# Patient Record
Sex: FEMALE | Race: WHITE | NOT HISPANIC OR LATINO | Employment: OTHER | URBAN - METROPOLITAN AREA
[De-identification: names, ages, dates, MRNs, and addresses within clinical notes are randomized per-mention and may not be internally consistent; named-entity substitution may affect disease eponyms.]

---

## 2017-01-10 ENCOUNTER — LAB CONVERSION - ENCOUNTER (OUTPATIENT)
Dept: OTHER | Facility: OTHER | Age: 72
End: 2017-01-10

## 2017-01-10 LAB
A/G RATIO (HISTORICAL): 1.6 (CALC) (ref 1–2.5)
ALBUMIN SERPL BCP-MCNC: 4.3 G/DL (ref 3.6–5.1)
ALP SERPL-CCNC: 51 U/L (ref 33–130)
ALT SERPL W P-5'-P-CCNC: 15 U/L (ref 6–29)
AST SERPL W P-5'-P-CCNC: 15 U/L (ref 10–35)
BILIRUB SERPL-MCNC: 0.4 MG/DL (ref 0.2–1.2)
BUN SERPL-MCNC: 23 MG/DL (ref 7–25)
BUN/CREA RATIO (HISTORICAL): 42 (CALC) (ref 6–22)
CALCIUM SERPL-MCNC: 8.9 MG/DL (ref 8.6–10.4)
CHLORIDE SERPL-SCNC: 102 MMOL/L (ref 98–110)
CHOLEST SERPL-MCNC: 155 MG/DL (ref 125–200)
CHOLEST/HDLC SERPL: 2.1 (CALC)
CO2 SERPL-SCNC: 29 MMOL/L (ref 20–31)
CREAT SERPL-MCNC: 0.55 MG/DL (ref 0.6–0.93)
EGFR AFRICAN AMERICAN (HISTORICAL): 109 ML/MIN/1.73M2
EGFR-AMERICAN CALC (HISTORICAL): 94 ML/MIN/1.73M2
GAMMA GLOBULIN (HISTORICAL): 2.7 G/DL (CALC) (ref 1.9–3.7)
GLUCOSE (HISTORICAL): 106 MG/DL (ref 65–99)
HBA1C MFR BLD HPLC: 6.6 % OF TOTAL HGB
HDLC SERPL-MCNC: 73 MG/DL
LDL CHOLESTEROL (HISTORICAL): 57 MG/DL (CALC)
NON-HDL-CHOL (CHOL-HDL) (HISTORICAL): 82 MG/DL (CALC)
POTASSIUM SERPL-SCNC: 4.2 MMOL/L (ref 3.5–5.3)
SODIUM SERPL-SCNC: 139 MMOL/L (ref 135–146)
TOTAL PROTEIN (HISTORICAL): 7 G/DL (ref 6.1–8.1)
TRIGL SERPL-MCNC: 123 MG/DL

## 2017-01-23 ENCOUNTER — GENERIC CONVERSION - ENCOUNTER (OUTPATIENT)
Dept: OTHER | Facility: OTHER | Age: 72
End: 2017-01-23

## 2017-01-23 ENCOUNTER — ALLSCRIPTS OFFICE VISIT (OUTPATIENT)
Dept: OTHER | Facility: OTHER | Age: 72
End: 2017-01-23

## 2017-02-13 ENCOUNTER — ALLSCRIPTS OFFICE VISIT (OUTPATIENT)
Dept: OTHER | Facility: OTHER | Age: 72
End: 2017-02-13

## 2017-07-11 ENCOUNTER — GENERIC CONVERSION - ENCOUNTER (OUTPATIENT)
Dept: OTHER | Facility: OTHER | Age: 72
End: 2017-07-11

## 2017-07-11 ENCOUNTER — LAB CONVERSION - ENCOUNTER (OUTPATIENT)
Dept: OTHER | Facility: OTHER | Age: 72
End: 2017-07-11

## 2017-07-11 LAB
A/G RATIO (HISTORICAL): 1.5 (CALC) (ref 1–2.5)
ALBUMIN SERPL BCP-MCNC: 4 G/DL (ref 3.6–5.1)
ALP SERPL-CCNC: 49 U/L (ref 33–130)
ALT SERPL W P-5'-P-CCNC: 12 U/L (ref 6–29)
AST SERPL W P-5'-P-CCNC: 15 U/L (ref 10–35)
BASOPHILS # BLD AUTO: 0.7 %
BASOPHILS # BLD AUTO: 41 CELLS/UL (ref 0–200)
BILIRUB SERPL-MCNC: 0.4 MG/DL (ref 0.2–1.2)
BUN SERPL-MCNC: 19 MG/DL (ref 7–25)
BUN/CREA RATIO (HISTORICAL): 33 (CALC) (ref 6–22)
CALCIUM SERPL-MCNC: 9.4 MG/DL (ref 8.6–10.4)
CHLORIDE SERPL-SCNC: 102 MMOL/L (ref 98–110)
CHOLEST SERPL-MCNC: 149 MG/DL (ref 125–200)
CHOLEST/HDLC SERPL: 2.3 (CALC)
CO2 SERPL-SCNC: 32 MMOL/L (ref 20–31)
CREAT SERPL-MCNC: 0.57 MG/DL (ref 0.6–0.93)
DEPRECATED RDW RBC AUTO: 13.9 % (ref 11–15)
EGFR AFRICAN AMERICAN (HISTORICAL): 107 ML/MIN/1.73M2
EGFR-AMERICAN CALC (HISTORICAL): 92 ML/MIN/1.73M2
EOSINOPHIL # BLD AUTO: 189 CELLS/UL (ref 15–500)
EOSINOPHIL # BLD AUTO: 3.2 %
GAMMA GLOBULIN (HISTORICAL): 2.7 G/DL (CALC) (ref 1.9–3.7)
GLUCOSE (HISTORICAL): 108 MG/DL (ref 65–99)
HBA1C MFR BLD HPLC: 6.3 % OF TOTAL HGB
HCT VFR BLD AUTO: 37.4 % (ref 35–45)
HDLC SERPL-MCNC: 66 MG/DL
HGB BLD-MCNC: 12.1 G/DL (ref 11.7–15.5)
LDL CHOLESTEROL (HISTORICAL): 61 MG/DL (CALC)
LYMPHOCYTES # BLD AUTO: 2195 CELLS/UL (ref 850–3900)
LYMPHOCYTES # BLD AUTO: 37.2 %
MCH RBC QN AUTO: 30.5 PG (ref 27–33)
MCHC RBC AUTO-ENTMCNC: 32.4 G/DL (ref 32–36)
MCV RBC AUTO: 94.2 FL (ref 80–100)
MONOCYTES # BLD AUTO: 407 CELLS/UL (ref 200–950)
MONOCYTES (HISTORICAL): 6.9 %
NEUTROPHILS # BLD AUTO: 3068 CELLS/UL (ref 1500–7800)
NEUTROPHILS # BLD AUTO: 52 %
NON-HDL-CHOL (CHOL-HDL) (HISTORICAL): 83 MG/DL (CALC)
PLATELET # BLD AUTO: 318 THOUSAND/UL (ref 140–400)
PMV BLD AUTO: 10.7 FL (ref 7.5–12.5)
POTASSIUM SERPL-SCNC: 4.4 MMOL/L (ref 3.5–5.3)
RBC # BLD AUTO: 3.97 MILLION/UL (ref 3.8–5.1)
SODIUM SERPL-SCNC: 140 MMOL/L (ref 135–146)
TOTAL PROTEIN (HISTORICAL): 6.7 G/DL (ref 6.1–8.1)
TRIGL SERPL-MCNC: 111 MG/DL
WBC # BLD AUTO: 5.9 THOUSAND/UL (ref 3.8–10.8)

## 2017-07-19 ENCOUNTER — ALLSCRIPTS OFFICE VISIT (OUTPATIENT)
Dept: OTHER | Facility: OTHER | Age: 72
End: 2017-07-19

## 2017-07-19 DIAGNOSIS — E78.5 HYPERLIPIDEMIA: ICD-10-CM

## 2017-07-19 DIAGNOSIS — E11.9 TYPE 2 DIABETES MELLITUS WITHOUT COMPLICATIONS (HCC): ICD-10-CM

## 2017-07-19 DIAGNOSIS — F41.8 OTHER SPECIFIED ANXIETY DISORDERS: ICD-10-CM

## 2017-07-19 DIAGNOSIS — Z00.00 ENCOUNTER FOR GENERAL ADULT MEDICAL EXAMINATION WITHOUT ABNORMAL FINDINGS: ICD-10-CM

## 2017-07-19 DIAGNOSIS — I10 ESSENTIAL (PRIMARY) HYPERTENSION: ICD-10-CM

## 2017-07-20 LAB
CREATININE, RANDOM URINE (HISTORICAL): 74 MG/DL (ref 20–320)
MAGNESIUM, UR (HISTORICAL): 0.5 MG/DL
MICROALBUMIN/CREATININE RATIO (HISTORICAL): 7 MCG/MG CREAT

## 2017-07-31 ENCOUNTER — LAB CONVERSION - ENCOUNTER (OUTPATIENT)
Dept: OTHER | Facility: OTHER | Age: 72
End: 2017-07-31

## 2017-07-31 LAB — FECAL GLOBIN BY IMMUNOCHEM (HISTORICAL): NOT DETECTED

## 2017-08-01 ENCOUNTER — GENERIC CONVERSION - ENCOUNTER (OUTPATIENT)
Dept: OTHER | Facility: OTHER | Age: 72
End: 2017-08-01

## 2017-08-01 LAB — OCCULT BLD, FECAL IMMUNOLOGICAL (HISTORICAL): NEGATIVE

## 2017-09-13 ENCOUNTER — ALLSCRIPTS OFFICE VISIT (OUTPATIENT)
Dept: OTHER | Facility: OTHER | Age: 72
End: 2017-09-13

## 2017-10-23 ENCOUNTER — ALLSCRIPTS OFFICE VISIT (OUTPATIENT)
Dept: OTHER | Facility: OTHER | Age: 72
End: 2017-10-23

## 2017-10-23 DIAGNOSIS — M17.10 PRIMARY OSTEOARTHRITIS OF ONE KNEE: ICD-10-CM

## 2017-10-25 ENCOUNTER — TRANSCRIBE ORDERS (OUTPATIENT)
Dept: ADMINISTRATIVE | Facility: HOSPITAL | Age: 72
End: 2017-10-25

## 2017-10-25 ENCOUNTER — APPOINTMENT (OUTPATIENT)
Dept: RADIOLOGY | Facility: MEDICAL CENTER | Age: 72
End: 2017-10-25
Payer: COMMERCIAL

## 2017-10-25 DIAGNOSIS — M17.10 PRIMARY OSTEOARTHRITIS OF ONE KNEE: ICD-10-CM

## 2017-10-25 PROCEDURE — 73562 X-RAY EXAM OF KNEE 3: CPT

## 2017-10-25 NOTE — PROGRESS NOTES
Assessment  1  Osteoarthritis of knee (715 36) (M17 10)   2  Hypertension (401 9) (I10)   3  Diabetes mellitus (250 00) (E11 9)    Plan  Osteoarthritis of knee    · Meloxicam 15 MG Oral Tablet; take 1 tablet daily with food   · * XR KNEE 3 VW LEFT NON INJURY; Status:Active; Requested ICT:[id number];    · * XR KNEE 3 VW RIGHT NON INJURY; Status:Active; Requested CDI:[id number];     Discussion/Summary    1  Osteoarthritis of the knee bilaterally-patient does appear to have healthy kidneys and has not had any history of problem with peptic ulcer disease  Would recommend starting on meloxicam 15 mg daily with food  If this does not improve in the next 2 weeks would consider follow-up with orthopedic specialist for injection therapies or viscosupplementation  X-rays of the knees will also be performed bilaterally  Hypertension-presently stable  Will continue to monitor as patient is on NSAID therapy  Type 2 diabetes-presently stable  Will avoid steroids at this point as they may elevate her glucose readings  Chief Complaint  pt is here for bilateral knee pain   cd      History of Present Illness  HPI: This is a 76-year-old female who presents to the office for follow-up of ongoing bilateral knee pain  She was seen by Nell Lei in September for this and given a short course of prednisone  She states that this symptoms dramatically improved while she was on prednisone but as soon as she stopped it it became worse again  She is having trouble getting up from a seated position  There is a lot of grinding and crepitus in her knees with movement  She does notice some swelling generally bilaterally but has not had any redness  Review of Systems    Constitutional: no fever,-- not feeling poorly,-- no chills-- and-- not feeling tired  ENT: no nosebleeds-- and-- no nasal discharge  Cardiovascular: no chest pain-- and-- no palpitations  Respiratory: no cough-- and-- no shortness of breath during exertion     Gastrointestinal: no nausea-- and-- no diarrhea  Musculoskeletal: no arthralgias-- and-- no myalgias  Neurological: no headache-- and-- no confusion  Active Problems  1  Abnormal finding on mammography (793 80) (R92 8)   2  Bladder Incontinence   3  Closed Fracture Of Proximal End Of Humerus (812 00)   4  Colon cancer screening (V76 51) (Z12 11)   5  Cough (786 2) (R05)   6  Depression with anxiety (300 4) (F41 8)   7  Diabetes mellitus (250 00) (E11 9)   8  Dupuytren's contracture (728 6) (M72 0)   9  Encounter for screening mammogram for malignant neoplasm of breast (V76 12)   (Z12 31)   10  Glaucoma screening (V80 1) (Z13 5)   11  Hearing Loss (389 9)   12  Hyperlipidemia (272 4) (E78 5)   13  Hypertension (401 9) (I10)   14  Inflamed seborrheic keratosis (702 11) (L82 0)   15  Joint pain, knee (719 46) (M25 569)   16  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), 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)   17  LBBB (left bundle branch block) (426 3) (I44 7)   18  Need for 23-polyvalent pneumococcal polysaccharide vaccine (V03 82) (Z23)   19  Need for prophylactic vaccination and inoculation against influenza (V04 81) (Z23)   20  Nocturnal leg cramps (327 52) (G47 62)   21  Osteoarthritis of knee (715 36) (M17 10)   22  Osteoporosis screening (V82 81) (Z13 820)   23  Sinusitis (473 9) (J32 9)   24  Skin lesion (709 9) (L98 9)   25  Uncontrolled diabetes mellitus (250 02) (E11 65)    Past Medical History  1  Sinusitis (473 9) (J32 9)   2  History of Type 2 diabetes mellitus with hyperglycemia (250 00) (E11 65)  Active Problems And Past Medical History Reviewed: The active problems and past medical history were reviewed and updated today  Family History  Mother    1  Family history of Diabetes Mellitus (V18 0)   2  Family history of Hypertension (V17 49)  Father    3  Family history of Colon Cancer (V16 0)   4   Family history of Stroke Syndrome (V17 1)    Social History   · Being A Social Drinker   · Currently works part-time (V49 89) (Z78 9)   ·    · Never a smoker   · Secondhand smoke exposure (V15 89) (Z77 22)    Surgical History  1  History of Appendectomy   2  History of Breast Surgery Reduction Procedure Bilateral   3  History of Knee Surgery   4  History of Renal Lithotripsy   5  History of Tonsillectomy With Adenoidectomy    Current Meds   1  AmLODIPine Besylate 5 MG Oral Tablet; Take 1 tablet daily; Therapy: 66OJX7458 to (Ashly Muniz)  Requested for: 67ETT6691; Last   Rx:45Rlb4664 Ordered   2  Atenolol 25 MG Oral Tablet; TAKE 1 TABLET Bedtime; Therapy: 86THF7940 to (QNMVZVEV:65TIX5485)  Requested for: 75CUB0578; Last   Rx:17Jan2017 Ordered   3  Citalopram Hydrobromide 10 MG Oral Tablet; Take 1 tablet daily; Therapy: 55OCF9377 to (Last Rx:62Jpw5334)  Requested for: 82SKU0093 Ordered   4  Cyclobenzaprine HCl - 10 MG Oral Tablet; TAKE 1 TABLET AT BEDTIME; Therapy: 08BSO6502 to (Evaluate:13Yih8922)  Requested for: 61EQQ1869; Last   Rx:42Bmo7383 Ordered   5  FreeStyle Lite Device; USE AS DIRECTED; Therapy: 23Lgh3385 to (Last Rx:62Ujd1886)  Requested for: 17Imt3935 Ordered   6  FreeStyle Lite Test In Vitro Strip; TEST 2 TIMES A DAY   Dx 250; Therapy: 28Gmx9370 to (Last Rx:77Pru1995)  Requested for: 76Rny6694 Ordered   7  Furosemide 40 MG Oral Tablet; take one-half tablet daily; Therapy: 71DFF5045 to (Cooper Love)  Requested for: 03CBG2520; Last   Rx:26Jan2016 Ordered   8  Januvia 100 MG Oral Tablet; TAKE 1 TABLET DAILY; Therapy: 12BNK4709 to (Evaluate:20Iut8983)  Requested for: 46Zvw4872; Last   Rx:73Goz4906 Ordered   9  MetFORMIN HCl - 1000 MG Oral Tablet; TAKE 1 TABLET TWICE DAILY; Therapy: 48KQN4071 to (Evaluate:38Xcv6122)  Requested for: 24Ivl9277; Last   Rx:19Bfi5971 Ordered   10  Multivitamins CAPS; TAKE 1 CAPSULE DAILY; Therapy: (Recorded:47Erk8442) to Recorded   11  PredniSONE 10 MG Oral Tablet; TAKE 1 TABLET 3 times daily with meals;     Therapy: 13Sep2017 to (Last Rx: 22GZH6364)  Requested for: 98Pnd6401 Ordered   12  Simvastatin 40 MG Oral Tablet; take 1 tablet every day; Therapy: 54ZUU4712 to (Evaluate:95Jgb6373)  Requested for: 13Eky7533; Last    Rx:42Uxu8620 Ordered    The medication list was reviewed and updated today  Allergies  1  Penicillins    Vitals   Recorded: 99TYL2136 04:36PM   Heart Rate 78, L Radial   Pulse Quality Regular, L Radial   Systolic 898, LUE, Sitting   Diastolic 62, LUE, Sitting   Height 5 ft 4 in   Weight 228 lb    BMI Calculated 39 14   BSA Calculated 2 07     Physical Exam    Constitutional   General appearance: No acute distress, well appearing and well nourished  Eyes   Conjunctiva and lids: No swelling, erythema or discharge  Pupils and irises: Equal, round and reactive to light  Ears, Nose, Mouth, and Throat   External inspection of ears and nose: Normal     Otoscopic examination: Tympanic membranes translucent with normal light reflex  Canals patent without erythema  Nasal mucosa, septum, and turbinates: Normal without edema or erythema  Oropharynx: Normal with no erythema, edema, exudate or lesions  Pulmonary   Respiratory effort: No increased work of breathing or signs of respiratory distress  Auscultation of lungs: Clear to auscultation  Cardiovascular   Auscultation of heart: Normal rate and rhythm, normal S1 and S2, without murmurs  Examination of extremities for edema and/or varicosities: Normal     Carotid pulses: Normal     Musculoskeletal   Gait and station: Abnormal  -- Antalgic gait  Inspection/palpation of joints, bones, and muscles: Abnormal  -- Crepitus with range of motion bilateral knees  There is bilateral joint effusion present  Skin   Skin and subcutaneous tissue: Normal without rashes or lesions  Neurologic   Reflexes: 2+ and symmetric  Sensation: No sensory loss      Psychiatric   Orientation to person, place, and time: Normal     Mood and affect: Normal          Future Appointments    Date/Time Provider Specialty Site   11/21/2017 02:00 PM Andie Goodwin, [facility]     Signatures   Electronically signed by : Ministerio Arvizu, [facility]; Oct 23 2017  4:56PM EST                       (Author)    Electronically signed by : Kayla Rebolledo DO; Oct 24 2017  7:11AM EST

## 2017-10-31 ENCOUNTER — GENERIC CONVERSION - ENCOUNTER (OUTPATIENT)
Dept: OTHER | Facility: OTHER | Age: 72
End: 2017-10-31

## 2017-11-01 ENCOUNTER — GENERIC CONVERSION - ENCOUNTER (OUTPATIENT)
Dept: OTHER | Facility: OTHER | Age: 72
End: 2017-11-01

## 2018-01-10 NOTE — RESULT NOTES
Verified Results  (Q) FECAL GLOBIN BY IMMUNOCHEMISTRY [id number] 12:00AM Pat Farrar   REPORT COMMENT:  DATE 2ND SAMPLE 7-     Test Name Result Flag Reference   FECAL GLOBIN RESULT: Not Detected     Not Detected

## 2018-01-11 NOTE — RESULT NOTES
Verified Results  (Q) FECAL GLOBIN BY IMMUNOCHEM  (MEDICARE) [id number] 12:00AM Bess Carson   REPORT COMMENT:  2ND [facility] [id number]     Test Name Result Flag Reference   FECAL GLOBIN BY$IMMUNOCHEM  (MEDICARE)      FECAL GLOBIN BY IMMUNOCHEM   (MEDICARE)         MICRO NUMBER:      [id number]    TEST STATUS:       FINAL    SPECIMEN SOURCE:   INSURE () FOBT TEST CARD    SPECIMEN QUALITY:  ADEQUATE    RESULT:            Not Detected

## 2018-01-12 VITALS — DIASTOLIC BLOOD PRESSURE: 74 MMHG | HEART RATE: 68 BPM | SYSTOLIC BLOOD PRESSURE: 140 MMHG | WEIGHT: 229 LBS

## 2018-01-12 NOTE — RESULT NOTES
Verified Results  (Q) HEMOGLOBIN A1c 74OJG3725 11:06AM Rodolfo Jean   REPORT COMMENT:  FASTING:NO     Test Name Result Flag Reference   HEMOGLOBIN A1c 7 3 % of total Hgb H <5 7   According to ADA guidelines, hemoglobin A1c <7 0%  represents optimal control in non-pregnant diabetic  patients  Different metrics may apply to specific  patient populations  Standards of Medical Care in    Diabetes Care  2013;36:s11-s66     For the purpose of screening for the presence of  diabetes  <5 7%       Consistent with the absence of diabetes  5 7-6 4%    Consistent with increased risk for diabetes              (prediabetes)  >or=6 5%    Consistent with diabetes     This assay result is consistent with diabetes  mellitus  Currently, no consensus exists for use of hemoglobin  A1c for diagnosis of diabetes for children

## 2018-01-12 NOTE — RESULT NOTES
Verified Results  (Q) HEMOGLOBIN A1c 49DUE5275 10:54AM Jocelyne Douglas   REPORT COMMENT:  FASTING:YES  COLLECTION KIT GIVEN TO PATIENT  PATIENT ADVISED TO RETURN  Test Name Result Flag Reference   HEMOGLOBIN A1c 6 3 % of total Hgb H <5 7   For someone without known diabetes, a hemoglobin   A1c value between 5 7% and 6 4% is consistent with  prediabetes and should be confirmed with a   follow-up test      For someone with known diabetes, a value <7%  indicates that their diabetes is well controlled  A1c  targets should be individualized based on duration of  diabetes, age, comorbid conditions, and other  considerations  This assay result is consistent with an increased risk  of diabetes  Currently, no consensus exists regarding use of  hemoglobin A1c for diagnosis of diabetes for children  (Q) LIPID PANEL WITH DIRECT LDL 84REG4083 10:54AM Jocelyne Douglas     Test Name Result Flag Reference   CHOLESTEROL, TOTAL 149 mg/dL  125-200   HDL CHOLESTEROL 66 mg/dL  > OR = 46   TRIGLICERIDES 629 mg/dL  <150   CHOL/HDLC RATIO 2 3 (calc)  < OR = 5 0   NON HDL CHOLESTEROL 83 mg/dL (calc)     Target for non-HDL cholesterol is 30 mg/dL higher than   LDL cholesterol target  LDL-CHOLESTEROL 61 mg/dL (calc)  <130   Desirable range <100 mg/dL for patients with CHD or  diabetes and <70 mg/dL for diabetic patients with  known heart disease

## 2018-01-12 NOTE — RESULT NOTES
Message   Recorded as Task   Date: 10/31/2017 08:52 AM, Created By: Abdulkadir Goldsmith   Task Name: Call Patient with results   Assigned To: Joesph Hagen   Regarding Patient: Cindy Penn, Status: Complete   Comment:    Joesph Hagen - 31 Oct 2017 8:52 AM     Patient Phone: [phone]    X-rays of the knee shows significant arthritis and inflammation in the knees  If symptoms are not improved with meloxicam as prescribed I would recommend she see orthopedic specialist to consider further treatment such as injections  Nathaly Hill - 01 Nov 2017 1:08 PM     TASK IN PROGRESS   Nathaly Hill - 01 Nov 2017 1:09 PM     TASK EDITED  Pt aware of results and recommendations     Nathaly Hill - 01 Nov 2017 1:09 PM     TASK COMPLETED        Signatures   Electronically signed by : Shanon Sutton, ; Nov 1 2017  1:10PM EST                       (Author)

## 2018-01-13 VITALS
WEIGHT: 228 LBS | HEART RATE: 78 BPM | DIASTOLIC BLOOD PRESSURE: 62 MMHG | BODY MASS INDEX: 38.93 KG/M2 | HEIGHT: 64 IN | SYSTOLIC BLOOD PRESSURE: 130 MMHG

## 2018-01-13 VITALS
WEIGHT: 233.5 LBS | HEIGHT: 64 IN | BODY MASS INDEX: 39.86 KG/M2 | SYSTOLIC BLOOD PRESSURE: 128 MMHG | HEART RATE: 60 BPM | DIASTOLIC BLOOD PRESSURE: 68 MMHG

## 2018-01-14 VITALS
WEIGHT: 236.5 LBS | BODY MASS INDEX: 40.37 KG/M2 | SYSTOLIC BLOOD PRESSURE: 110 MMHG | DIASTOLIC BLOOD PRESSURE: 70 MMHG | HEIGHT: 64 IN | HEART RATE: 60 BPM

## 2018-01-14 NOTE — RESULT NOTES
Verified Results  * XR KNEE 3 VW RIGHT NON INJURY 25Oct2017 02:04PM [facility] Order Number: [id number]     Test Name Result Flag Reference   XR KNEE 3 VW RIGHT (Report)     RIGHT KNEE     INDICATION: Bilateral knee pain     COMPARISON: None     VIEWS: 3     IMAGES: 3     FINDINGS:     There is no acute fracture or dislocation  Small to moderate knee effusion seen     There is severe lateral compartment osteoarthritis  There is moderate patellofemoral osteophytosis  There are marginal osteophytes     No lytic or blastic lesions are seen  Soft tissues are unremarkable  IMPRESSION:     Severe lateral compartment osteoarthritis   Moderate patellofemoral osteoarthritis   No acute displaced fracture or dislocation seen       Workstation performed: [id number]     Signed by:   Librado Dc MD   10/28/17     * XR KNEE 3 VW LEFT NON INJURY 25Oct2017 02:04PM Kay Aristes Order Number: [id number]     Test Name Result Flag Reference   XR KNEE 3 VW LEFT (Report)     LEFT KNEE     INDICATION: Left knee pain  COMPARISON: None     VIEWS: 3     IMAGES: 3     FINDINGS:     There is no acute fracture or dislocation  Small joint effusion seen     Mild medial compartment narrowing seen  Mild marginal osteophyte seen from the lateral compartment   Mild patellofemoral osteoarthritis     No lytic or blastic lesions are seen  Soft tissues are unremarkable         IMPRESSION:     Mild medial compartment and mild lateral compartment osteoarthritis seen with mild patellofemoral osteoarthritis   No acute displaced fracture   Small knee effusion       Workstation performed: [id number]     Signed by:   Librado Dc MD   10/28/17

## 2018-01-15 NOTE — RESULT NOTES
Verified Results  (Q) HEMOGLOBIN A1c 60BRF6788 10:19AM Danielluis Kawasaki   REPORT COMMENT:  FASTING:YES     Test Name Result Flag Reference   HEMOGLOBIN A1c 6 7 % of total Hgb H <5 7   According to ADA guidelines, hemoglobin A1c <7 0%  represents optimal control in non-pregnant diabetic  patients  Different metrics may apply to specific  patient populations  Standards of Medical Care in    Diabetes Care  2013;36:s11-s66     For the purpose of screening for the presence of  diabetes  <5 7%       Consistent with the absence of diabetes  5 7-6 4%    Consistent with increased risk for diabetes              (prediabetes)  >or=6 5%    Consistent with diabetes     This assay result is consistent with diabetes  mellitus  Currently, no consensus exists for use of hemoglobin  A1c for diagnosis of diabetes for children

## 2018-01-16 NOTE — PROGRESS NOTES
Assessment    1  Osteoarthritis of knee (208 96) (M17 10)    Plan  Need for prophylactic vaccination and inoculation against influenza    · Fluzone High-Dose 0 5 ML Intramuscular Suspension Prefilled Syringe  Osteoarthritis of knee    · PredniSONE 10 MG Oral Tablet; TAKE 1 TABLET 3 times daily with meals    Discussion/Summary    #1  Osteoarthritis of the left knee, acute exacerbation  She was placed on prednisone 10 mg one 3 times a day #21  She was warned that her blood sugar would probably go up because of the prednisone and she is aware of this  Goal to get rid of her knee pain  Follow-up if not better  Possible side effects of new medications were reviewed with the patient/guardian today  The treatment plan was reviewed with the patient/guardian  The patient/guardian understands and agrees with the treatment plan     Self Referrals: No      Chief Complaint  Left knee pain that started Saturday  No specific injury  Pain is constant  High dose flu immunization administered today- [facility]      History of Present Illness  HPI: This is a 48-year-old female who comes in with left knee pain for the past 4 days  She got up in the morning to go to work and after work went to a flea market and walked around for a while and by the time she was ready to leave the flea market she had left knee pain that made her limp back to the car  The left knee is swollen and is difficult to flex  Her blood pressure today is 140/70 and her weight remains steady at 229 pounds  Review of Systems    Constitutional: No fever, no chills, feels well, no tiredness, no recent weight gain or loss  Cardiovascular: no complaints of slow or fast heart rate, no chest pain, no palpitations, no leg claudication or lower extremity edema  Gastrointestinal: no complaints of abdominal pain, no constipation, no nausea or diarrhea, no vomiting, no bloody stools     Genitourinary: no complaints of dysuria, no incontinence, no pelvic pain, no dysmenorrhea, no vaginal discharge or abnormal vaginal bleeding  Musculoskeletal: joint swelling, joint stiffness and Left knee pain, but as noted in HPI, no limb pain, no myalgias and no limb swelling    The patient presents with complaints of sudden onset of constant episodes of moderate left knee arthralgias, described as dull and aching, non-radiating  ROS reviewed  Active Problems    1  Abnormal finding on mammography (793 80) (R92 8)   2  Bladder Incontinence   3  Closed Fracture Of Proximal End Of Humerus (812 00)   4  Colon cancer screening (V76 51) (Z12 11)   5  Cough (786 2) (R05)   6  Depression with anxiety (300 4) (F41 8)   7  Diabetes mellitus (250 00) (E11 9)   8  Dupuytren's contracture (728 6) (M72 0)   9  Encounter for screening mammogram for malignant neoplasm of breast (V76 12)   (Z12 31)   10  Glaucoma screening (V80 1) (Z13 5)   11  Hearing Loss (389 9)   12  Hyperlipidemia (272 4) (E78 5)   13  Hypertension (401 9) (I10)   14  Inflamed seborrheic keratosis (702 11) (L82 0)   15  Joint pain, knee (719 46) (M25 569)   16  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), 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)   17  LBBB (left bundle branch block) (426 3) (I44 7)   18  Need for 23-polyvalent pneumococcal polysaccharide vaccine (V03 82) (Z23)   19  Need for prophylactic vaccination and inoculation against influenza (V04 81) (Z23)   20  Nocturnal leg cramps (327 52) (G47 62)   21  Osteoarthritis of knee (715 36) (M17 10)   22  Osteoporosis screening (V82 81) (Z13 820)   23  Sinusitis (473 9) (J32 9)   24  Skin lesion (709 9) (L98 9)   25  Uncontrolled diabetes mellitus (250 02) (E11 65)    Past Medical History    1  Sinusitis (473 9) (J32 9)   2  History of Type 2 diabetes mellitus with hyperglycemia (250 00) (E11 65)  Active Problems And Past Medical History Reviewed: The active problems and past medical history were reviewed and updated today  Family History  Mother    1   Family history of Diabetes Mellitus (V18 0)   2  Family history of Hypertension (V17 49)  Father    3  Family history of Colon Cancer (V16 0)   4  Family history of Stroke Syndrome (V17 1)  Family History Reviewed: The family history was reviewed and updated today  Social History    · Being A Social Drinker   · Currently works part-time (V49 89) (Z78 9)   ·    · Never a smoker   · Secondhand smoke exposure (V15 89) (Z77 22)  The social history was reviewed and updated today  The social history was reviewed and is unchanged  Surgical History    1  History of Appendectomy   2  History of Breast Surgery Reduction Procedure Bilateral   3  History of Knee Surgery   4  History of Renal Lithotripsy   5  History of Tonsillectomy With Adenoidectomy  Surgical History Reviewed: The surgical history was reviewed and updated today  Current Meds   1  AmLODIPine Besylate 5 MG Oral Tablet; Take 1 tablet daily; Therapy: 83YYE6077 to (Bautista King)  Requested for: 13YWD8620; Last   Rx:85Xvy2497 Ordered   2  Atenolol 25 MG Oral Tablet; TAKE 1 TABLET Bedtime; Therapy: 87NKD1733 to (SIUJMTVO:67ORR1358)  Requested for: 55RAI9881; Last   Rx:29Vho9900 Ordered   3  Citalopram Hydrobromide 10 MG Oral Tablet; Take 1 tablet daily; Therapy: 21QHU8872 to (Last Rx:59Tux9289)  Requested for: 86QWA5110 Ordered   4  Cyclobenzaprine HCl - 10 MG Oral Tablet; TAKE 1 TABLET AT BEDTIME; Therapy: 07JAN1786 to (Evaluate:07Xwk1541)  Requested for: 13DUM1934; Last   Rx:58Bgo1342 Ordered   5  FreeStyle Lite Device; USE AS DIRECTED; Therapy: 54Hls7846 to (Last Rx:49Jlb6072)  Requested for: 48Byt3576 Ordered   6  FreeStyle Lite Test In Vitro Strip; TEST 2 TIMES A DAY   Dx 250; Therapy: 20Qve5382 to (Last Rx:15Biy8351)  Requested for: 21Ppa0032 Ordered   7  Furosemide 40 MG Oral Tablet; take one-half tablet daily; Therapy: 84VNW9677 to (Mckay Dixon)  Requested for: 58XZJ8148; Last   Rx:26Jan2016 Ordered   8   Januvia 100 MG Oral Tablet; TAKE 1 TABLET DAILY; Therapy: 80SGR4426 to (Evaluate:74Dyi8943)  Requested for: 02Igy2394; Last   Rx:79Zaw6977 Ordered   9  MetFORMIN HCl - 1000 MG Oral Tablet; TAKE 1 TABLET TWICE DAILY; Therapy: 07LJM3895 to (Evaluate:88Tgf5731)  Requested for: 81Yxe3858; Last   Rx:83Xkw2468 Ordered   10  Multivitamins CAPS; TAKE 1 CAPSULE DAILY; Therapy: (Recorded:81Wdb2952) to Recorded   11  Simvastatin 40 MG Oral Tablet; take 1 tablet every day; Therapy: 43KHF5121 to (Evaluate:97Ath4825)  Requested for: 22Cog4277; Last    Rx:49Qsi3162 Ordered    The medication list was reviewed and updated today  Allergies    1  Penicillins    Vitals   Recorded: 13Sep2017 12:18PM   Heart Rate 80, L Radial   Pulse Quality Regular, L Radial   Systolic 558, LUE, Sitting   Diastolic 70, LUE, Sitting   Height 5 ft 4 in   Weight 229 lb    BMI Calculated 39 31   BSA Calculated 2 07     Physical Exam    Constitutional   General appearance: No acute distress, well appearing and well nourished  Pulmonary   Auscultation of lungs: Clear to auscultation  Cardiovascular   Auscultation of heart: Normal rate and rhythm, normal S1 and S2, without murmurs  Musculoskeletal   Inspection/palpation of joints, bones, and muscles: Abnormal   Tender to palpation medial and lateral aspects of left knee with swelling  Limited range of motion with flexion and extension  Pulses equal bilaterally          Future Appointments    Date/Time Provider Specialty Site   11/21/2017 02:00 PM Jon Corley [facility]     Signatures   Electronically signed by : Sahra Shelley [facility]; Sep 13 2017 12:40PM EST                       (Author)

## 2018-01-18 NOTE — MISCELLANEOUS
Message   Recorded as Task   Date: 01/21/2016 12:52 PM, Created By: Gala Player   Task Name: Medical Complaint Callback   Assigned To: Eliana Salvador   Regarding Patient: Jonathan Snyder, Status: Active   Comment:    Radha Serrato - 21 Jan 2016 12:52 PM     TASK CREATED  Caller: suri- [facility]; Medical Complaint; [phone]  general surgery, found a silent heart episode and wanted to discuss with you  please call suri she is going to [place] but will be back by 2    sh is faxing you the trip  Eliana Salvador - 21 Jan 2016 2:12 PM     TASK EDITED  LM FOR SURI TO CALL BACK  Eliana Salvador - 21 Jan 2016 2:53 PM     TASK EDITED  Spoke with Suri at [facility]  She states Pt was in today for oral surgery under general anaesthsia  She was hooked up to cardiac monitor and it looked to them like she had some kind of cardiac event  Pt denied any cardiac hx  She will need medical and cardiac clearance prior to further dental surgery  Per TS, recommended Pt have OV for eval with 12 lead EKG  Suri states she will call Pt and have her call for appt  I will also follow up with Pt  Active Problems    1  Abnormal finding on mammography (793 80) (R92 8)   2  Bronchitis (490) (J40)   3  Closed Fracture Of Proximal End Of Humerus (812 00)   4  Colon cancer screening (V76 51) (Z12 11)   5  Cough (786 2) (R05)   6  Diabetes mellitus (250 00) (E11 9)   7  Dupuytren's contracture (728 6) (M72 0)   8  Encounter for screening mammogram for malignant neoplasm of breast (V76 12)   (Z12 31)   9  Glaucoma screening (V80 1) (Z13 5)   10  Hearing Loss (389 9)   11  Hyperlipidemia (272 4) (E78 5)   12  Hypertension (401 9) (I10)   13  Inflamed seborrheic keratosis (702 11) (L82 0)   14  Joint pain, knee (719 46) (M25 569)   15  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), 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)   16   Need for prophylactic vaccination and inoculation against influenza (V04 81) (Z23) 17  Osteoarthritis of knee (715 36) (M17 9)   18  Osteoporosis screening (V82 81) (Z13 820)   19  Skin lesion (709 9) (L98 9)   20  Uncontrolled diabetes mellitus (250 02) (E11 65)    Current Meds   1  AmLODIPine Besylate 5 MG Oral Tablet; Take 1 tablet daily; Therapy: 49FFC1602 to (Kimberly Geronimo)  Requested for: 64VYT4115; Last   Rx:58Wvh9586 Ordered   2  Benzonatate 200 MG Oral Capsule; One three times a day; Therapy: 03IUI7509 to (Last Rx:06Jan2016)  Requested for: 85TTE3387 Ordered   3  FreeStyle Lite Device; USE AS DIRECTED; Therapy: 07Akv8193 to (Last Rx:46Teq3053)  Requested for: 03Gdh9513 Ordered   4  FreeStyle Lite Test In Vitro Strip; TEST 2 TIMES A DAY   Dx 250; Therapy: 20Vtg6997 to (Last Rx:70Gck9289)  Requested for: 70Lym8590 Ordered   5  Furosemide 40 MG Oral Tablet; Take 1 tablet daily; Therapy: 02IKM2137 to (Evaluate:18Mar2015)  Requested for: 94LQR9717; Last   Rx:44Ftu3004 Ordered   6  MetFORMIN HCl - 850 MG Oral Tablet; take 1 tablet twice a day; Therapy: 08YTH9843 to (Kimberly Geronimo)  Requested for: 55OZM1647; Last   Rx:33Tsm5123 Ordered   7  Simvastatin 40 MG Oral Tablet; take 1 tablet every day; Therapy: 62WUQ2828 to (Evaluate:02Ibw7336)  Requested for: 46Wia4194; Last   Rx:06Nzq3414 Ordered    Allergies    1   Penicillins    Signatures   Electronically signed by : Mike Miramontes, ; Jan 21 2016  2:53PM EST                       (Author)

## 2018-01-22 VITALS
BODY MASS INDEX: 39.09 KG/M2 | SYSTOLIC BLOOD PRESSURE: 140 MMHG | DIASTOLIC BLOOD PRESSURE: 70 MMHG | HEART RATE: 80 BPM | WEIGHT: 229 LBS | HEIGHT: 64 IN

## 2018-01-24 LAB
ALBUMIN SERPL-MCNC: 4.2 G/DL (ref 3.6–5.1)
ALBUMIN/CREAT UR: 9 MCG/MG CREAT
ALBUMIN/GLOB SERPL: 1.6 (CALC) (ref 1–2.5)
ALP SERPL-CCNC: 50 U/L (ref 33–130)
ALT SERPL-CCNC: 13 U/L (ref 6–29)
AST SERPL-CCNC: 16 U/L (ref 10–35)
BILIRUB SERPL-MCNC: 0.5 MG/DL (ref 0.2–1.2)
BUN SERPL-MCNC: 21 MG/DL (ref 7–25)
BUN/CREAT SERPL: ABNORMAL (CALC) (ref 6–22)
CALCIUM SERPL-MCNC: 9.5 MG/DL (ref 8.6–10.4)
CHLORIDE SERPL-SCNC: 101 MMOL/L (ref 98–110)
CHOLEST SERPL-MCNC: 150 MG/DL
CHOLEST/HDLC SERPL: 2.1 (CALC)
CO2 SERPL-SCNC: 28 MMOL/L (ref 20–31)
CREAT SERPL-MCNC: 0.6 MG/DL (ref 0.6–0.93)
CREAT UR-MCNC: 88 MG/DL (ref 20–320)
GLOBULIN SER CALC-MCNC: 2.7 G/DL (CALC) (ref 1.9–3.7)
GLUCOSE SERPL-MCNC: 113 MG/DL (ref 65–99)
HBA1C MFR BLD: 6 % OF TOTAL HGB
HDLC SERPL-MCNC: 73 MG/DL
LDLC SERPL CALC-MCNC: 57 MG/DL (CALC)
MICROALBUMIN UR-MCNC: 0.8 MG/DL
NONHDLC SERPL-MCNC: 77 MG/DL (CALC)
POTASSIUM SERPL-SCNC: 4.7 MMOL/L (ref 3.5–5.3)
PROT SERPL-MCNC: 6.9 G/DL (ref 6.1–8.1)
SL AMB EGFR AFRICAN AMERICAN: 105 ML/MIN/1.73M2
SL AMB EGFR NON AFRICAN AMERICAN: 90 ML/MIN/1.73M2
SODIUM SERPL-SCNC: 139 MMOL/L (ref 135–146)
TRIGL SERPL-MCNC: 112 MG/DL
TSH SERPL-ACNC: 3.15 MIU/L (ref 0.4–4.5)

## 2018-01-25 ENCOUNTER — TELEPHONE (OUTPATIENT)
Dept: FAMILY MEDICINE CLINIC | Facility: CLINIC | Age: 73
End: 2018-01-25

## 2018-02-01 ENCOUNTER — OFFICE VISIT (OUTPATIENT)
Dept: FAMILY MEDICINE CLINIC | Facility: CLINIC | Age: 73
End: 2018-02-01
Payer: COMMERCIAL

## 2018-02-01 VITALS
HEIGHT: 64 IN | HEART RATE: 72 BPM | SYSTOLIC BLOOD PRESSURE: 120 MMHG | WEIGHT: 230 LBS | DIASTOLIC BLOOD PRESSURE: 60 MMHG | BODY MASS INDEX: 39.27 KG/M2

## 2018-02-01 DIAGNOSIS — M25.561 BILATERAL CHRONIC KNEE PAIN: Primary | ICD-10-CM

## 2018-02-01 DIAGNOSIS — I10 ESSENTIAL HYPERTENSION: ICD-10-CM

## 2018-02-01 DIAGNOSIS — F32.9 REACTIVE DEPRESSION: ICD-10-CM

## 2018-02-01 DIAGNOSIS — E11.9 TYPE 2 DIABETES MELLITUS WITHOUT COMPLICATION, WITHOUT LONG-TERM CURRENT USE OF INSULIN (HCC): ICD-10-CM

## 2018-02-01 DIAGNOSIS — G89.29 BILATERAL CHRONIC KNEE PAIN: Primary | ICD-10-CM

## 2018-02-01 DIAGNOSIS — E78.2 MIXED HYPERLIPIDEMIA: ICD-10-CM

## 2018-02-01 DIAGNOSIS — M25.562 BILATERAL CHRONIC KNEE PAIN: Primary | ICD-10-CM

## 2018-02-01 PROCEDURE — 3725F SCREEN DEPRESSION PERFORMED: CPT | Performed by: FAMILY MEDICINE

## 2018-02-01 PROCEDURE — 1101F PT FALLS ASSESS-DOCD LE1/YR: CPT | Performed by: FAMILY MEDICINE

## 2018-02-01 PROCEDURE — 99214 OFFICE O/P EST MOD 30 MIN: CPT | Performed by: FAMILY MEDICINE

## 2018-02-01 RX ORDER — BLOOD-GLUCOSE METER
KIT MISCELLANEOUS
COMMUNITY
Start: 2014-09-19 | End: 2021-08-17 | Stop reason: SDUPTHER

## 2018-02-01 RX ORDER — CITALOPRAM 10 MG/1
20 TABLET ORAL DAILY
Qty: 90 TABLET | Refills: 3 | Status: SHIPPED | OUTPATIENT
Start: 2018-02-01 | End: 2018-10-25 | Stop reason: DRUGHIGH

## 2018-02-01 RX ORDER — AMLODIPINE BESYLATE 5 MG/1
1 TABLET ORAL DAILY
COMMUNITY
Start: 2011-06-03 | End: 2018-05-01 | Stop reason: SDUPTHER

## 2018-02-01 RX ORDER — SIMVASTATIN 40 MG
1 TABLET ORAL DAILY
COMMUNITY
Start: 2012-09-10 | End: 2018-07-30 | Stop reason: SDUPTHER

## 2018-02-01 RX ORDER — MELOXICAM 15 MG/1
1 TABLET ORAL
COMMUNITY
Start: 2017-10-23 | End: 2018-12-11

## 2018-02-01 RX ORDER — CYCLOBENZAPRINE HCL 10 MG
1 TABLET ORAL
COMMUNITY
Start: 2016-09-26 | End: 2018-10-25 | Stop reason: SDUPTHER

## 2018-02-01 RX ORDER — FUROSEMIDE 40 MG/1
TABLET ORAL
COMMUNITY
Start: 2011-06-04 | End: 2021-08-17

## 2018-02-01 RX ORDER — ATENOLOL 25 MG/1
1 TABLET ORAL
COMMUNITY
Start: 2016-01-26 | End: 2019-01-11 | Stop reason: SDUPTHER

## 2018-02-01 RX ORDER — CITALOPRAM 10 MG/1
1 TABLET ORAL DAILY
COMMUNITY
Start: 2017-01-23 | End: 2018-02-01 | Stop reason: SDUPTHER

## 2018-02-01 NOTE — PROGRESS NOTES
I have reviewed the notes, assessments, and/or procedures performed by , I concur with her/his documentation of Jack Garcia

## 2018-02-01 NOTE — PROGRESS NOTES
Assessment/Plan:    Essential hypertension  Her blood pressure is stable at 120/60 and she is currently on atenolol 25 mg daily and furosemide 40 mg daily  She is also on amlodipine 5 mg daily  Type 2 diabetes mellitus without complication, without long-term current use of insulin ([facility])  Her diabetes is stable at 113 and her hemoglobin A1c came down from 6 3% to 6 0%  She currently takes metformin 1000 mg twice a day and she does test her sugars daily  Mixed hyperlipidemia  Her cholesterol numbers are at goal with the LDL 57  She is currently on simvastatin 40 mg daily  Reactive depression  She filled out the depression survey and is still depressed because she is having a rough year  She has no suicidal ideation or plan  She is currently on citalopram and her citalopram was increased to 20 mg daily  There is an interaction between the citalopram and the meloxicam and she will try Tylenol arthritis 650 mg 1 or 2 tablets 2 or 3 times a day  Bilateral chronic knee pain  Her knees are giving her increased problems and is requesting an orthopedic consult which will be given to her  She is also on meloxicam 15 mg which is not helping much and was switched to Tylenol arthritis 650 mg 1 or 2 tablets 2 or 3 times a day because there is an interaction between the meloxicam and citalopram        Diagnoses and all orders for this visit:    Bilateral chronic knee pain  -     Ambulatory referral to Orthopedic Surgery    Essential hypertension    Mixed hyperlipidemia    Type 2 diabetes mellitus without complication, without long-term current use of insulin ([facility])    Reactive depression  -     citalopram (CeleXA) 10 mg tablet; Take 2 tablets (20 mg total) by mouth daily    Other orders  -     amLODIPine (NORVASC) 5 mg tablet; Take 1 tablet by mouth daily  -     atenolol (TENORMIN) 25 mg tablet; Take 1 tablet by mouth  -     Discontinue: citalopram (CeleXA) 10 mg tablet;  Take 1 tablet by mouth daily  - cyclobenzaprine (FLEXERIL) 10 mg tablet; Take 1 tablet by mouth  -     Blood Glucose Monitoring Suppl (FREESTYLE FREEDOM LITE) w/Device KIT; by Does not apply route  -     glucose blood (FREESTYLE LITE) test strip; by In Vitro route  -     furosemide (LASIX) 40 mg tablet; Take by mouth  -     sitaGLIPtin (JANUVIA) 100 mg tablet; Take 1 tablet by mouth daily  -     meloxicam (MOBIC) 15 mg tablet; Take 1 tablet by mouth  -     metFORMIN (GLUCOPHAGE) 1000 MG tablet; Take 1 tablet by mouth 2 (two) times a day  -     Multiple Vitamin (MULTIVITAMINS PO); Take 1 capsule by mouth daily  -     simvastatin (ZOCOR) 40 mg tablet; Take 1 tablet by mouth daily          Subjective:      Patient ID: King Barton is a 67 y o  female  CC:  Follow up hyperlipidemia, HTN, DM 2   Review BW results  Pt also c/o B L knee pain  Previously had xrays that showed arthritis  -  lsh    HPI:  This is a 67-year-old female who comes in for her regular visit  Her only complaint today is bilateral knee pain  At her last visit she was given meloxicam but it really has not done much for the arthritis  She had an x-ray which showed degenerative arthritis  She will get an orthopedic consult  Her blood pressure today is 120/60 and her weight came down 9 lb from 239 lb to 230 lb  Also her depressive survey showed that she is depressed and is currently on citalopram 10 mg daily and that will be increased to 20 mg daily  Reviewing her labs her glucose is 113, cholesterol numbers are at goal with the LDL 57, her urine microalbumin is normal, TSH is normal, hemoglobin A1c is improved from 6 3-6 0%  The following portions of the patient's history were reviewed and updated as appropriate: allergies, current medications, past family history, past medical history, past social history, past surgical history and problem list     Review of Systems   Constitutional: Negative  HENT: Negative  Eyes: Negative  Respiratory: Negative  Cardiovascular: Negative  Gastrointestinal: Negative  Endocrine: Negative  Genitourinary: Negative  Musculoskeletal: Positive for arthralgias, gait problem and myalgias  Negative for back pain, joint swelling, neck pain and neck stiffness  Skin: Negative  Allergic/Immunologic: Negative  Hematological: Negative  Psychiatric/Behavioral: Negative  Objective:     Physical Exam   Constitutional: She is oriented to person, place, and time  She appears well-developed and well-nourished  HENT:   Head: Normocephalic  Right Ear: External ear normal    Left Ear: External ear normal    Mouth/Throat: Oropharynx is clear and moist    Eyes: Conjunctivae and EOM are normal  Pupils are equal, round, and reactive to light  Neck: Normal range of motion  Neck supple  Cardiovascular: Normal rate, regular rhythm and normal heart sounds  Pulmonary/Chest: Effort normal and breath sounds normal    Abdominal: Soft  Bowel sounds are normal    Musculoskeletal: Normal range of motion  No tenderness to palpation of the knees with limited range of motion of flexion and extension  Negative straight leg raising  Neurological: She is alert and oriented to person, place, and time  Skin: Skin is warm  Psychiatric: She has a normal mood and affect   Her behavior is normal  Judgment and thought content normal

## 2018-02-01 NOTE — ASSESSMENT & PLAN NOTE
She filled out the depression survey and is still depressed because she is having a rough year  She has no suicidal ideation or plan  She is currently on citalopram and her citalopram was increased to 20 mg daily  There is an interaction between the citalopram and the meloxicam and she will try Tylenol arthritis 650 mg 1 or 2 tablets 2 or 3 times a day

## 2018-02-01 NOTE — ASSESSMENT & PLAN NOTE
Her diabetes is stable at 113 and her hemoglobin A1c came down from 6 3% to 6 0%  She currently takes metformin 1000 mg twice a day and she does test her sugars daily

## 2018-02-01 NOTE — ASSESSMENT & PLAN NOTE
Her blood pressure is stable at 120/60 and she is currently on atenolol 25 mg daily and furosemide 40 mg daily  She is also on amlodipine 5 mg daily

## 2018-02-01 NOTE — ASSESSMENT & PLAN NOTE
Her knees are giving her increased problems and is requesting an orthopedic consult which will be given to her    She is also on meloxicam 15 mg which is not helping much and was switched to Tylenol arthritis 650 mg 1 or 2 tablets 2 or 3 times a day because there is an interaction between the meloxicam and citalopram

## 2018-02-22 ENCOUNTER — OFFICE VISIT (OUTPATIENT)
Dept: FAMILY MEDICINE CLINIC | Facility: CLINIC | Age: 73
End: 2018-02-22

## 2018-02-22 VITALS
HEIGHT: 64 IN | SYSTOLIC BLOOD PRESSURE: 130 MMHG | TEMPERATURE: 98.3 F | WEIGHT: 227 LBS | BODY MASS INDEX: 38.76 KG/M2 | DIASTOLIC BLOOD PRESSURE: 72 MMHG

## 2018-02-22 DIAGNOSIS — J01.90 ACUTE NON-RECURRENT SINUSITIS, UNSPECIFIED LOCATION: Primary | ICD-10-CM

## 2018-02-22 DIAGNOSIS — F41.8 DEPRESSION WITH ANXIETY: ICD-10-CM

## 2018-02-22 DIAGNOSIS — I10 ESSENTIAL HYPERTENSION: ICD-10-CM

## 2018-02-22 DIAGNOSIS — R05.9 COUGH: ICD-10-CM

## 2018-02-22 PROBLEM — M25.569 KNEE PAIN: Status: ACTIVE | Noted: 2018-02-01

## 2018-02-22 PROBLEM — E78.5 HYPERLIPIDEMIA: Status: ACTIVE | Noted: 2018-02-01

## 2018-02-22 PROCEDURE — 3078F DIAST BP <80 MM HG: CPT | Performed by: FAMILY MEDICINE

## 2018-02-22 PROCEDURE — 3075F SYST BP GE 130 - 139MM HG: CPT | Performed by: FAMILY MEDICINE

## 2018-02-22 PROCEDURE — 3008F BODY MASS INDEX DOCD: CPT | Performed by: FAMILY MEDICINE

## 2018-02-22 PROCEDURE — 99214 OFFICE O/P EST MOD 30 MIN: CPT | Performed by: FAMILY MEDICINE

## 2018-02-22 RX ORDER — SULFAMETHOXAZOLE AND TRIMETHOPRIM 800; 160 MG/1; MG/1
1 TABLET ORAL EVERY 12 HOURS SCHEDULED
Qty: 20 TABLET | Refills: 0 | Status: SHIPPED | OUTPATIENT
Start: 2018-02-22 | End: 2018-03-04

## 2018-02-22 RX ORDER — BENZONATATE 200 MG/1
200 CAPSULE ORAL 3 TIMES DAILY PRN
Qty: 30 CAPSULE | Refills: 1 | Status: SHIPPED | OUTPATIENT
Start: 2018-02-22 | End: 2018-10-25 | Stop reason: DRUGHIGH

## 2018-02-22 NOTE — PROGRESS NOTES
Assessment/Plan:    Acute non-recurrent sinusitis  The patient was given Bactrim DS 1 twice a day #20  There is a reaction between Zithromax and citalopram and therefore I cannot use Zithromax  Cough  She is also to use Coricidin HBP/cold and flu and a sugar free Tussi for her cough  She was also given a prescription for Tessalon Perles 200 mg 1 tablet 3 times a day number 30 with 1 refill  She was also given Advair 100/51 puff twice a day for her cough  Depression with anxiety  Her depression and anxiety are much improved on increasing her citalopram from 10 mg daily to 20 mg daily  She will continue this dose  Hypertension  Her blood pressure is stable at 130/72 and she takes atenolol 25 mg daily and amlodipine 5 mg daily for her blood pressure       Diagnoses and all orders for this visit:    Acute non-recurrent sinusitis, unspecified location  -     sulfamethoxazole-trimethoprim (BACTRIM DS) 800-160 mg per tablet; Take 1 tablet by mouth every 12 (twelve) hours for 10 days    Essential hypertension    Depression with anxiety    Cough  -     benzonatate (TESSALON) 200 MG capsule; Take 1 capsule (200 mg total) by mouth 3 (three) times a day as needed for cough          Subjective:      Patient ID: Juli Shaver is a 67 y o  female  CC:  Follow up depression / anxiety  Evaluate med dosage change  Congestion, ears blocked, productive cough x 1 1/2 weeks  -  [facility]    HPI:  This is a 28-year-old female who comes in with symptoms of head congestion with postnasal drip, ears feel blocked and a productive cough  The congestion is also in the chest with some wheezing  Symptoms began approximately a week ago  Her blood pressure today is 130/72 and her temperature is 98 3°  Cough   This is a new problem  The current episode started in the past 7 days  The problem has been rapidly worsening  The problem occurs constantly  The cough is productive of sputum   Associated symptoms include ear congestion, ear pain, nasal congestion, postnasal drip, rhinorrhea and wheezing  Pertinent negatives include no fever, hemoptysis, sore throat or shortness of breath  Nothing aggravates the symptoms  She has tried OTC cough suppressant for the symptoms  The treatment provided no relief  The following portions of the patient's history were reviewed and updated as appropriate: allergies, current medications, past family history, past medical history, past social history, past surgical history and problem list     Review of Systems   Constitutional: Positive for fatigue  Negative for fever  HENT: Positive for congestion, ear pain, postnasal drip, rhinorrhea and sinus pressure  Negative for sore throat  Eyes: Negative  Respiratory: Positive for cough and wheezing  Negative for hemoptysis and shortness of breath  Cardiovascular: Negative  Gastrointestinal: Negative  Endocrine: Negative  Genitourinary: Negative  Musculoskeletal: Negative  Skin: Negative  Allergic/Immunologic: Negative  Neurological: Negative  Hematological: Negative  Psychiatric/Behavioral: Negative  Vitals:    02/22/18 1433   BP: 130/72   BP Location: Left arm   Patient Position: Sitting   Cuff Size: Large   Temp: 98 3 °F (36 8 °C)   TempSrc: Oral   Weight: 103 kg (227 lb)   Height: 5' 4" (1 626 m)   Objective:      /72 (BP Location: Left arm, Patient Position: Sitting, Cuff Size: Large)   Temp 98 3 °F (36 8 °C) (Oral)   Ht 5' 4" (1 626 m)   Wt 103 kg (227 lb)   BMI 38 96 kg/m²          Physical Exam   Constitutional: She is oriented to person, place, and time  She appears well-developed and well-nourished  HENT:   Head: Normocephalic  Right Ear: External ear normal    Left Ear: External ear normal    Mouth/Throat: Oropharyngeal exudate present     Positive postnasal drip, +1 erythema of posterior pharynx without exudates   Eyes: Conjunctivae and EOM are normal  Pupils are equal, round, and reactive to light  Neck: Normal range of motion  Neck supple  Cardiovascular: Normal rate, regular rhythm and normal heart sounds  Pulmonary/Chest: Effort normal  She has wheezes  She has no rales  Abdominal: Soft  Bowel sounds are normal    Musculoskeletal: Normal range of motion  Neurological: She is alert and oriented to person, place, and time  Skin: Skin is warm  Psychiatric: She has a normal mood and affect   Her behavior is normal  Judgment and thought content normal

## 2018-02-22 NOTE — ASSESSMENT & PLAN NOTE
She is also to use Coricidin HBP/cold and flu and a sugar free Tussi for her cough  She was also given a prescription for Tessalon Perles 200 mg 1 tablet 3 times a day number 30 with 1 refill  She was also given Advair 100/51 puff twice a day for her cough

## 2018-02-22 NOTE — ASSESSMENT & PLAN NOTE
The patient was given Bactrim DS 1 twice a day #20  There is a reaction between Zithromax and citalopram and therefore I cannot use Zithromax

## 2018-02-22 NOTE — ASSESSMENT & PLAN NOTE
Her depression and anxiety are much improved on increasing her citalopram from 10 mg daily to 20 mg daily  She will continue this dose

## 2018-02-22 NOTE — ASSESSMENT & PLAN NOTE
Her blood pressure is stable at 130/72 and she takes atenolol 25 mg daily and amlodipine 5 mg daily for her blood pressure

## 2018-03-10 ENCOUNTER — OFFICE VISIT (OUTPATIENT)
Dept: URGENT CARE | Facility: MEDICAL CENTER | Age: 73
End: 2018-03-10
Payer: COMMERCIAL

## 2018-03-10 VITALS
SYSTOLIC BLOOD PRESSURE: 158 MMHG | TEMPERATURE: 100.2 F | DIASTOLIC BLOOD PRESSURE: 80 MMHG | RESPIRATION RATE: 20 BRPM | OXYGEN SATURATION: 98 % | HEART RATE: 78 BPM

## 2018-03-10 DIAGNOSIS — M25.561 ACUTE PAIN OF RIGHT KNEE: ICD-10-CM

## 2018-03-10 DIAGNOSIS — S39.012A LUMBAR STRAIN, INITIAL ENCOUNTER: Primary | ICD-10-CM

## 2018-03-10 PROCEDURE — 99213 OFFICE O/P EST LOW 20 MIN: CPT | Performed by: FAMILY MEDICINE

## 2018-03-10 PROCEDURE — S9088 SERVICES PROVIDED IN URGENT: HCPCS | Performed by: FAMILY MEDICINE

## 2018-03-10 RX ORDER — PREDNISONE 50 MG/1
50 TABLET ORAL DAILY
Qty: 5 TABLET | Refills: 0 | Status: SHIPPED | OUTPATIENT
Start: 2018-03-10 | End: 2018-10-25 | Stop reason: DRUGHIGH

## 2018-03-11 NOTE — PATIENT INSTRUCTIONS
Symptoms are likely consistent with lumbar strain of the right side  Radiculopathy cannot be ruled out but no concerns for cauda equina syndrome  At this point I advised to continue with Tylenol arthritis along with prednisone and Flexeril muscle relaxer which you have at home  Apply heat locally and may use Biofreeze locally  Knee examination is within normal limits and at baseline and knee pain is likely exacerbation of your chronic osteoarthritis  Follow up with primary care doctor on Monday or Tuesday  Seek immediate medical attention if any worsening of the symptoms

## 2018-03-22 NOTE — PROGRESS NOTES
[address] [facility]        NAME: Ehsan Calderon is a 67 y o  female  : 1945    MRN: [id number]  DATE: 2018  TIME: 5:23 PM    Assessment and Plan   Lumbar strain, initial encounter [C57 375O]  1  Lumbar strain, initial encounter  predniSONE 50 mg tablet   2  Acute pain of right knee  predniSONE 50 mg tablet         Patient Instructions       Follow up with PCP in 3-5 days  Proceed to  ER if symptoms worsen  Chief Complaint     Chief Complaint   Patient presents with    Knee Injury     States fell in driveway 2 days ago  States since last night right sided back pain with radiation down right leg   Back Pain         History of Present Illness       Back Pain   This is a new problem  The current episode started in the past 7 days  The problem occurs constantly  The problem is unchanged  The pain is present in the lumbar spine  The pain is moderate  The symptoms are aggravated by bending and twisting  Associated symptoms include leg pain  Pertinent negatives include no abdominal pain, bladder incontinence, bowel incontinence, dysuria, fever, numbness, tingling or weakness  Knee Pain    The injury mechanism was a fall  The pain is present in the right knee  The pain is moderate  Pertinent negatives include no loss of motion, loss of sensation, muscle weakness, numbness or tingling  She reports no foreign bodies present  Review of Systems   Review of Systems   Constitutional: Negative for fever  Gastrointestinal: Negative for abdominal pain and bowel incontinence  Genitourinary: Negative for bladder incontinence and dysuria  Musculoskeletal: Positive for back pain  Neurological: Negative for tingling, weakness and numbness           Current Medications       Current Outpatient Prescriptions:     amLODIPine (NORVASC) 5 mg tablet, Take 1 tablet by mouth daily, Disp: , Rfl:     atenolol (TENORMIN) 25 mg tablet, Take 1 tablet by mouth, Disp: , Rfl:     benzonatate (TESSALON) 200 MG capsule, Take 1 capsule (200 mg total) by mouth 3 (three) times a day as needed for cough, Disp: 30 capsule, Rfl: 1    Blood Glucose Monitoring Suppl (FREESTYLE FREEDOM LITE) w/Device KIT, by Does not apply route, Disp: , Rfl:     citalopram (CeleXA) 10 mg tablet, Take 2 tablets (20 mg total) by mouth daily, Disp: 90 tablet, Rfl: 3    cyclobenzaprine (FLEXERIL) 10 mg tablet, Take 1 tablet by mouth, Disp: , Rfl:     furosemide (LASIX) 40 mg tablet, Take by mouth, Disp: , Rfl:     glucose blood (FREESTYLE LITE) test strip, by In Vitro route, Disp: , Rfl:     metFORMIN (GLUCOPHAGE) 1000 MG tablet, Take 1 tablet by mouth 2 (two) times a day, Disp: , Rfl:     Multiple Vitamin (MULTIVITAMINS PO), Take 1 capsule by mouth daily, Disp: , Rfl:     simvastatin (ZOCOR) 40 mg tablet, Take 1 tablet by mouth daily, Disp: , Rfl:     sitaGLIPtin (JANUVIA) 100 mg tablet, Take 1 tablet by mouth daily, Disp: , Rfl:     meloxicam (MOBIC) 15 mg tablet, Take 1 tablet by mouth, Disp: , Rfl:     predniSONE 50 mg tablet, Take 1 tablet (50 mg total) by mouth daily, Disp: 5 tablet, Rfl: 0    Current Allergies     Allergies as of 03/10/2018 - Reviewed 03/10/2018   Allergen Reaction Noted    Penicillins Anaphylaxis 05/24/2012            The following portions of the patient's history were reviewed and updated as appropriate: allergies, current medications, past family history, past medical history, past social history, past surgical history and problem list      Past Medical History:   Diagnosis Date    Arrhythmia     Diabetes mellitus ([facility] [address] [facility]     Hyperlipidemia     Hypertension     Type 2 diabetes mellitus with hyperglycemia ([facility] [address] )     last assessed: 9/19/2014       Past Surgical History:   Procedure Laterality Date    APPENDECTOMY      BREAST SURGERY      Reduction Procedure bilateral    KNEE SURGERY      LITHOTRIPSY      renal    TONSILLECTOMY AND ADENOIDECTOMY         Family History   Problem Relation Age of Onset    Diabetes Mother     Hypertension Mother     Colon cancer Father     Stroke Father          Medications have been verified  Objective   /80 (BP Location: Right arm, Patient Position: Sitting, Cuff Size: Standard)   Pulse 78   Temp 100 2 °F (37 9 °C) (Tympanic)   Resp 20   SpO2 98%        Physical Exam     Physical Exam   Constitutional: She appears well-developed and well-nourished  No distress  HENT:   Right Ear: Ear canal normal  Tympanic membrane is not erythematous and not bulging  Left Ear: Ear canal normal  Tympanic membrane is not erythematous and not bulging  Nose: Mucosal edema and rhinorrhea present  Mouth/Throat: Mucous membranes are normal  No uvula swelling  No oropharyngeal exudate, posterior oropharyngeal edema, posterior oropharyngeal erythema or tonsillar abscesses  Eyes: Conjunctivae and EOM are normal    Neck: Neck supple  Cardiovascular: Normal rate, regular rhythm and normal heart sounds  Pulmonary/Chest: Effort normal and breath sounds normal  No respiratory distress  She has no wheezes  She has no rales  Musculoskeletal:     Mild to moderate tenderness diffusely in the lumbar paravertebral muscles  Unable to do digital examination secondary to pain  DTR 2+ at knees bilaterally and normal    Skin: She is not diaphoretic  Nursing note and vitals reviewed

## 2018-04-21 DIAGNOSIS — E11.9 TYPE 2 DIABETES MELLITUS WITHOUT COMPLICATION, WITHOUT LONG-TERM CURRENT USE OF INSULIN (HCC): Primary | ICD-10-CM

## 2018-04-23 RX ORDER — SITAGLIPTIN 100 MG/1
TABLET, FILM COATED ORAL
Qty: 90 TABLET | Refills: 3 | Status: SHIPPED | OUTPATIENT
Start: 2018-04-23 | End: 2019-02-13 | Stop reason: SDUPTHER

## 2018-05-01 DIAGNOSIS — I10 BENIGN ESSENTIAL HYPERTENSION: Primary | ICD-10-CM

## 2018-05-01 RX ORDER — AMLODIPINE BESYLATE 5 MG/1
TABLET ORAL
Qty: 90 TABLET | Refills: 3 | Status: SHIPPED | OUTPATIENT
Start: 2018-05-01 | End: 2019-04-22 | Stop reason: SDUPTHER

## 2018-07-30 DIAGNOSIS — E78.2 MIXED HYPERLIPIDEMIA: Primary | ICD-10-CM

## 2018-07-30 RX ORDER — SIMVASTATIN 40 MG
TABLET ORAL
Qty: 90 TABLET | Refills: 3 | Status: SHIPPED | OUTPATIENT
Start: 2018-07-30 | End: 2019-09-03 | Stop reason: SDUPTHER

## 2018-09-02 DIAGNOSIS — E11.9 TYPE 2 DIABETES MELLITUS WITHOUT COMPLICATION, WITHOUT LONG-TERM CURRENT USE OF INSULIN (HCC): Primary | ICD-10-CM

## 2018-10-25 ENCOUNTER — OFFICE VISIT (OUTPATIENT)
Dept: FAMILY MEDICINE CLINIC | Facility: CLINIC | Age: 73
End: 2018-10-25
Payer: COMMERCIAL

## 2018-10-25 VITALS
BODY MASS INDEX: 37.22 KG/M2 | DIASTOLIC BLOOD PRESSURE: 70 MMHG | HEART RATE: 88 BPM | TEMPERATURE: 98.9 F | HEIGHT: 64 IN | WEIGHT: 218 LBS | SYSTOLIC BLOOD PRESSURE: 136 MMHG

## 2018-10-25 DIAGNOSIS — K52.9 GASTROENTERITIS: ICD-10-CM

## 2018-10-25 DIAGNOSIS — G47.62 NOCTURNAL LEG CRAMPS: Primary | ICD-10-CM

## 2018-10-25 PROCEDURE — 99213 OFFICE O/P EST LOW 20 MIN: CPT | Performed by: FAMILY MEDICINE

## 2018-10-25 PROCEDURE — 1036F TOBACCO NON-USER: CPT | Performed by: FAMILY MEDICINE

## 2018-10-25 PROCEDURE — 3008F BODY MASS INDEX DOCD: CPT | Performed by: FAMILY MEDICINE

## 2018-10-25 RX ORDER — CYCLOBENZAPRINE HCL 10 MG
10 TABLET ORAL
Qty: 30 TABLET | Refills: 0 | Status: SHIPPED | OUTPATIENT
Start: 2018-10-25 | End: 2019-06-13 | Stop reason: SDUPTHER

## 2018-10-25 RX ORDER — ONDANSETRON 4 MG/1
4 TABLET, ORALLY DISINTEGRATING ORAL EVERY 6 HOURS PRN
Qty: 20 TABLET | Refills: 0 | Status: SHIPPED | OUTPATIENT
Start: 2018-10-25 | End: 2018-12-11 | Stop reason: ALTCHOICE

## 2018-10-25 RX ORDER — CITALOPRAM 20 MG/1
20 TABLET ORAL DAILY
Refills: 3 | COMMUNITY
Start: 2018-09-02 | End: 2019-06-13 | Stop reason: SDUPTHER

## 2018-10-25 NOTE — PROGRESS NOTES
Assessment/Plan:    Gastroenteritis  The patient was given a prescription for Zofran 4 mg disintegrating tablet 1 tablet every 6 hr p r n  vomiting  She was given 20 tablets  She was also instructed on the Clear liquids for 24 hours, including water, 7up,Sprite,Ginger ale, 1/2 strength Gatorade  After 24 hours, a BRAT diet consisting of bananas,rice,applesauce or toast may be eaten  Nocturnal leg cramps  Her Flexeril prescription was renewed  This does help her nocturnal leg cramps  She uses the medication on a when necessary basis       Diagnoses and all orders for this visit:    Nocturnal leg cramps  -     cyclobenzaprine (FLEXERIL) 10 mg tablet; Take 1 tablet (10 mg total) by mouth daily at bedtime    Gastroenteritis  -     ondansetron (ZOFRAN-ODT) 4 mg disintegrating tablet; Take 1 tablet (4 mg total) by mouth every 6 (six) hours as needed for nausea or vomiting for up to 5 days    Other orders  -     citalopram (CeleXA) 20 mg tablet; Take 20 mg by mouth daily          Subjective:   Nausea / vomiting x 2 days  Constantly belching  Headache  Has not eaten in the past 2 days  -  [facility]     Patient ID: Hedy Raines is a 75 y o  female  This is a 41-year-old female who comes in with 2 days of nausea and vomiting  She is also experiencing and increased amount of belching  She has vomited approximately 5 times in the last 2 days and the last time she threw up was yesterday  She denies any diarrhea  She does have a slight headache  Her blood pressure is 136/70 and her temperature is 98 9°  The following portions of the patient's history were reviewed and updated as appropriate: allergies, current medications, past family history, past medical history, past social history, past surgical history and problem list     Review of Systems   Constitutional: Negative  HENT: Negative  Eyes: Negative  Respiratory: Negative  Cardiovascular: Negative      Gastrointestinal: Positive for nausea and vomiting  Negative for abdominal distention, abdominal pain and diarrhea  Endocrine: Negative  Genitourinary: Negative  Musculoskeletal: Negative  Skin: Negative  Allergic/Immunologic: Negative  Neurological: Negative  Hematological: Negative  Psychiatric/Behavioral: Negative  Objective:      /70 (BP Location: Left arm, Patient Position: Sitting, Cuff Size: Large)   Pulse 88   Temp 98 9 °F (37 2 °C) (Oral)   Ht 5' 4" (1 626 m)   Wt 98 9 kg (218 lb)   BMI 37 42 kg/m²          Physical Exam   Constitutional: She is oriented to person, place, and time  She appears well-developed and well-nourished  HENT:   Head: Normocephalic  Right Ear: External ear normal    Left Ear: External ear normal    Mouth/Throat: Oropharynx is clear and moist    Eyes: Pupils are equal, round, and reactive to light  Conjunctivae and EOM are normal    Neck: Normal range of motion  Neck supple  Cardiovascular: Normal rate, regular rhythm and normal heart sounds  Pulmonary/Chest: Effort normal and breath sounds normal    Abdominal: Soft  Bowel sounds are normal  She exhibits no distension and no mass  There is no tenderness  There is no rebound and no guarding  Musculoskeletal: Normal range of motion  Neurological: She is alert and oriented to person, place, and time  Skin: Skin is warm  Psychiatric: She has a normal mood and affect  Her behavior is normal  Judgment and thought content normal    Nursing note and vitals reviewed

## 2018-10-25 NOTE — ASSESSMENT & PLAN NOTE
The patient was given a prescription for Zofran 4 mg disintegrating tablet 1 tablet every 6 hr p r n  vomiting  She was given 20 tablets  She was also instructed on the Clear liquids for 24 hours, including water, 7up,Sprite,Ginger ale, 1/2 strength Gatorade  After 24 hours, a BRAT diet consisting of bananas,rice,applesauce or toast may be eaten

## 2018-10-25 NOTE — ASSESSMENT & PLAN NOTE
Her Flexeril prescription was renewed  This does help her nocturnal leg cramps    She uses the medication on a when necessary basis

## 2018-12-11 ENCOUNTER — OFFICE VISIT (OUTPATIENT)
Dept: FAMILY MEDICINE CLINIC | Facility: CLINIC | Age: 73
End: 2018-12-11
Payer: COMMERCIAL

## 2018-12-11 ENCOUNTER — TELEPHONE (OUTPATIENT)
Dept: FAMILY MEDICINE CLINIC | Facility: CLINIC | Age: 73
End: 2018-12-11

## 2018-12-11 ENCOUNTER — HOSPITAL ENCOUNTER (OUTPATIENT)
Dept: NON INVASIVE DIAGNOSTICS | Facility: HOSPITAL | Age: 73
Discharge: HOME | End: 2018-12-11
Payer: COMMERCIAL

## 2018-12-11 VITALS
HEART RATE: 68 BPM | SYSTOLIC BLOOD PRESSURE: 140 MMHG | WEIGHT: 222 LBS | BODY MASS INDEX: 37.9 KG/M2 | HEIGHT: 64 IN | DIASTOLIC BLOOD PRESSURE: 70 MMHG

## 2018-12-11 DIAGNOSIS — M79.605 PAIN IN LEFT LEG: ICD-10-CM

## 2018-12-11 DIAGNOSIS — Z23 NEED FOR INFLUENZA VACCINATION: Primary | ICD-10-CM

## 2018-12-11 PROCEDURE — 1036F TOBACCO NON-USER: CPT | Performed by: FAMILY MEDICINE

## 2018-12-11 PROCEDURE — 93971 EXTREMITY STUDY: CPT | Performed by: SURGERY

## 2018-12-11 PROCEDURE — 3008F BODY MASS INDEX DOCD: CPT | Performed by: FAMILY MEDICINE

## 2018-12-11 PROCEDURE — 99214 OFFICE O/P EST MOD 30 MIN: CPT | Performed by: FAMILY MEDICINE

## 2018-12-11 PROCEDURE — 90662 IIV NO PRSV INCREASED AG IM: CPT | Performed by: FAMILY MEDICINE

## 2018-12-11 PROCEDURE — 93971 EXTREMITY STUDY: CPT

## 2018-12-11 PROCEDURE — 4040F PNEUMOC VAC/ADMIN/RCVD: CPT | Performed by: FAMILY MEDICINE

## 2018-12-11 PROCEDURE — G0008 ADMIN INFLUENZA VIRUS VAC: HCPCS | Performed by: FAMILY MEDICINE

## 2018-12-11 PROCEDURE — 1160F RVW MEDS BY RX/DR IN RCRD: CPT | Performed by: FAMILY MEDICINE

## 2018-12-11 NOTE — ASSESSMENT & PLAN NOTE
Patient was instructed to use acetaminophen 650 mg 2 in the morning and 2 in the evening along with moist heat and will get a stat venous Doppler today of the left leg    Rule out DVT/Baker's cyst

## 2018-12-11 NOTE — PATIENT INSTRUCTIONS

## 2018-12-11 NOTE — PROGRESS NOTES
Assessment/Plan:    Pain in left leg  Patient was instructed to use acetaminophen 650 mg 2 in the morning and 2 in the evening along with moist heat and will get a stat venous Doppler today of the left leg  Rule out DVT/Baker's cyst        Diagnoses and all orders for this visit:    Need for influenza vaccination  -     PREFERRED: influenza vaccine, 4715-2164, high-dose, PF 0 5 mL, for patients 65 yr+ (FLUZONE HIGH-DOSE)    Pain in left leg  -     VAS lower limb venous duplex study, unilateral/limited; Future          Subjective:   Left leg pain x 1 month - pt states she cannot bend her knee  No specific injury  Pain is constant  The only relief she gets is when she is in her recliner with leg elevated  High Dose flu immunization administered today -  [facility]     Patient ID: Juli Shaver is a 75 y o  female  This is a 76-year-old female who comes in with left leg pain for the past month getting progressively worse  She is now to the point where she is unable to bend her knee and only gets relief by sitting in her recliner with the leg support up  She denies any injury or fall  The pain is now constant and she has trouble ambulating  Her blood pressure is 140/70 and her weight is 222 lb with a BMI of 38 1  The following portions of the patient's history were reviewed and updated as appropriate: allergies, current medications, past family history, past medical history, past social history, past surgical history and problem list     Review of Systems   Constitutional: Negative  HENT: Negative  Eyes: Negative  Respiratory: Negative  Cardiovascular: Negative  Gastrointestinal: Negative  Endocrine: Negative  Genitourinary: Negative  Musculoskeletal: Positive for gait problem  Left leg pain   Skin: Negative  Allergic/Immunologic: Negative  Hematological: Negative  Psychiatric/Behavioral: Negative            Objective:      /70 (BP Location: Left arm, Patient Position: Sitting, Cuff Size: Large)   Pulse 68   Ht 5' 4" (1 626 m)   Wt 101 kg (222 lb)   BMI 38 11 kg/m²          Physical Exam   Constitutional: She is oriented to person, place, and time  She appears well-developed and well-nourished  HENT:   Head: Normocephalic  Right Ear: External ear normal    Left Ear: External ear normal    Mouth/Throat: Oropharynx is clear and moist    Eyes: Pupils are equal, round, and reactive to light  Conjunctivae and EOM are normal    Neck: Normal range of motion  Neck supple  Cardiovascular: Normal rate, regular rhythm and normal heart sounds  Pulmonary/Chest: Effort normal and breath sounds normal    Abdominal: Soft  Bowel sounds are normal    Musculoskeletal: Normal range of motion  She exhibits tenderness  She exhibits no edema or deformity  Tender to palpation posterior lower thigh and posterior knee  Difficulty with range of motion especially with flexion  Neurological: She is alert and oriented to person, place, and time  Skin: Skin is warm  No rash noted  No erythema  No pallor  Psychiatric: She has a normal mood and affect  Her behavior is normal  Judgment and thought content normal    Nursing note and vitals reviewed  BMI Counseling: Body mass index is 38 11 kg/m²  Discussed the patient's BMI with her  The BMI is above average  BMI counseling and education was provided to the patient  Nutrition recommendations include reducing portion sizes, decreasing overall calorie intake, 3-5 servings of fruits/vegetables daily, consuming healthier snacks, moderation in carbohydrate intake and reducing intake of cholesterol  Exercise recommendations include moderate aerobic physical activity for 150 minutes/week

## 2018-12-11 NOTE — TELEPHONE ENCOUNTER
BT, WET READ on pt's Vascular study was negative for DVT  The complete report is not back but they mention a 6cm complex structure on the Popliteal fossa suspicious for a Bakers Cyst  I spoke to pt and let her know that she does NOT have a DVT and advised we will call once complete report is back

## 2018-12-12 DIAGNOSIS — M79.605 PAIN IN LEFT LEG: ICD-10-CM

## 2018-12-12 DIAGNOSIS — M25.561 ACUTE PAIN OF RIGHT KNEE: Primary | ICD-10-CM

## 2018-12-12 DIAGNOSIS — M71.22 SYNOVIAL CYST OF LEFT POPLITEAL SPACE: ICD-10-CM

## 2018-12-12 PROBLEM — M71.20 BAKER'S CYST: Status: ACTIVE | Noted: 2018-12-12

## 2018-12-12 NOTE — TELEPHONE ENCOUNTER
I put instructions in for the nurses to have the patient continue Tylenol twice a day and moist heat and if no better by next week to see Orthopedics and I put a generic orthopedic order in for her

## 2018-12-17 ENCOUNTER — OFFICE VISIT (OUTPATIENT)
Dept: OBGYN CLINIC | Facility: MEDICAL CENTER | Age: 73
End: 2018-12-17
Payer: COMMERCIAL

## 2018-12-17 ENCOUNTER — APPOINTMENT (OUTPATIENT)
Dept: RADIOLOGY | Facility: CLINIC | Age: 73
End: 2018-12-17
Payer: COMMERCIAL

## 2018-12-17 VITALS
WEIGHT: 219 LBS | DIASTOLIC BLOOD PRESSURE: 72 MMHG | SYSTOLIC BLOOD PRESSURE: 146 MMHG | HEIGHT: 64 IN | BODY MASS INDEX: 37.39 KG/M2 | HEART RATE: 67 BPM

## 2018-12-17 DIAGNOSIS — M25.561 PAIN IN BOTH KNEES, UNSPECIFIED CHRONICITY: ICD-10-CM

## 2018-12-17 DIAGNOSIS — M25.562 PAIN IN BOTH KNEES, UNSPECIFIED CHRONICITY: ICD-10-CM

## 2018-12-17 DIAGNOSIS — M25.561 PAIN IN BOTH KNEES, UNSPECIFIED CHRONICITY: Primary | ICD-10-CM

## 2018-12-17 DIAGNOSIS — M25.562 PAIN IN BOTH KNEES, UNSPECIFIED CHRONICITY: Primary | ICD-10-CM

## 2018-12-17 DIAGNOSIS — M71.22 BAKER'S CYST OF KNEE, LEFT: ICD-10-CM

## 2018-12-17 DIAGNOSIS — M79.605 PAIN IN LEFT LEG: ICD-10-CM

## 2018-12-17 DIAGNOSIS — M25.561 ACUTE PAIN OF RIGHT KNEE: ICD-10-CM

## 2018-12-17 DIAGNOSIS — M17.0 PRIMARY OSTEOARTHRITIS OF BOTH KNEES: ICD-10-CM

## 2018-12-17 PROCEDURE — 73564 X-RAY EXAM KNEE 4 OR MORE: CPT

## 2018-12-17 PROCEDURE — 99203 OFFICE O/P NEW LOW 30 MIN: CPT | Performed by: ORTHOPAEDIC SURGERY

## 2018-12-17 PROCEDURE — 20610 DRAIN/INJ JOINT/BURSA W/O US: CPT | Performed by: ORTHOPAEDIC SURGERY

## 2018-12-17 RX ORDER — METHYLPREDNISOLONE ACETATE 40 MG/ML
1 INJECTION, SUSPENSION INTRA-ARTICULAR; INTRALESIONAL; INTRAMUSCULAR; SOFT TISSUE
Status: COMPLETED | OUTPATIENT
Start: 2018-12-17 | End: 2018-12-17

## 2018-12-17 RX ORDER — LIDOCAINE HYDROCHLORIDE 10 MG/ML
4 INJECTION, SOLUTION INFILTRATION; PERINEURAL
Status: COMPLETED | OUTPATIENT
Start: 2018-12-17 | End: 2018-12-17

## 2018-12-17 RX ADMIN — LIDOCAINE HYDROCHLORIDE 4 ML: 10 INJECTION, SOLUTION INFILTRATION; PERINEURAL at 08:52

## 2018-12-17 RX ADMIN — METHYLPREDNISOLONE ACETATE 1 ML: 40 INJECTION, SUSPENSION INTRA-ARTICULAR; INTRALESIONAL; INTRAMUSCULAR; SOFT TISSUE at 08:52

## 2018-12-17 NOTE — PATIENT INSTRUCTIONS
Cortisone Injection Post-Care Instructions:  Overview:  Cortisone shots are injections that may help relieve pain and inflammation in a specific area of your body  The injections usually comprise a corticosteroid medication and a local anesthetic  After the cortisone shot some people have redness and a feeling of warmth of the chest and face  If you have diabetes, a cortisone shot might temporarily increase your blood sugar levels  Post-Injection Instructions:   After your cortisone shot, your doctor might ask that you:   Protect the injection area for a day or two  For instance, if you received a cortisone shot in your shoulder, avoid heavy lifting  If you received a cortisone shot in your knee, stay off your feet when you can   Apply ice to the injection site as needed to relieve pain  Don't use heating pads   Watch for signs of infection, including increasing pain, redness and swelling that last more than 48 hours   Don't use a bathtub, hot tub or whirlpool for two days  You may shower  Side effects:  Cortisone shots may cause a temporary flare in pain and inflammation for up to 48 hours after the injection  Please call our office with any concerns following your injection  Results:   Pain and inflammation of the affected joint should decrease, and can last up to a few weeks to several months, possibly longer

## 2018-12-17 NOTE — PROGRESS NOTES
Ortho Sports Medicine Knee Visit     Assesment:   bilateral knee tricompartmental osteoarthritis with acute onset of left knee pain  Plan:    Conservative treatment:    Ice to knee for 20 minutes at least 1-2 times daily  PT for ROM/strengthening to knee, hip and core  OTC NSAIDS prn for pain  Weight loss discussed  Keep the knee straight whenever possilble  Let pain guide gradual return activities  Imaging: All imaging from today was reviewed by myself and explained to the patient  Injection:    The risks and benefits of the injection (which include but are not limited to: infection, bleeding,damage to nerve/artery, need for further intervention), as well as the risks and benefits of all alternative treatments were explained and understood  The patient elected to proceed with injection  The procedure was done with aseptic technique, and the patient tolerated the procedure well with no complications  A corticosteroid injection was performed  The patient should take 1-2 days off of activity, with gradual return to activity as tolerated  Ice to the knee 1-2 times daily for 20 minutes, for next 24-48 hrs  May consider future viscosupplementation injection depending on clinical exam/imaging  Surgery:     No surgery is recommended at this point, continue with conservative treatment plan as noted  Follow up:    Return in about 8 weeks (around 2/11/2019) for Recheck  Chief Complaint   Patient presents with    Left Knee - Pain    Right Knee - Pain       History of Present Illness: The patient is a 75 y o  female whose occupation is , referred to me by their primary care physician, seen in clinic for consultation of left knee pain  Pain is located anterior, posterior, lateral   The patient rates the pain as a 10/10  The pain has been present for 6 weeks  The patient denies an injury  The mechanism of injury was uknown   The pain is characterized as dull, achy  The pain is present at all times  Pain is improved by rest, ice and NSAIDS  Pain is aggravated by weight bearing, walking and sitting  Symptoms include clicking, catching and cracking  The patient has tried rest, ice and NSAIDS  Knee Surgical History:  Right knee surgery 15 years ago  Past Medical, Social and Family History:  Past Medical History:   Diagnosis Date    Arrhythmia     Diabetes mellitus ([facility] [address] )     Hyperlipidemia     Hypertension     Type 2 diabetes mellitus with hyperglycemia ([facility] [address] )     last assessed: 9/19/2014     Past Surgical History:   Procedure Laterality Date    APPENDECTOMY      BREAST SURGERY      Reduction Procedure bilateral    KNEE SURGERY      LITHOTRIPSY      renal    TONSILLECTOMY AND ADENOIDECTOMY       Allergies   Allergen Reactions    Penicillins Anaphylaxis     Current Outpatient Prescriptions on File Prior to Visit   Medication Sig Dispense Refill    amLODIPine (NORVASC) 5 mg tablet TAKE 1 TABLET DAILY 90 tablet 3    atenolol (TENORMIN) 25 mg tablet Take 1 tablet by mouth      Blood Glucose Monitoring Suppl (FREESTYLE FREEDOM LITE) w/Device KIT by Does not apply route      citalopram (CeleXA) 20 mg tablet Take 20 mg by mouth daily  3    cyclobenzaprine (FLEXERIL) 10 mg tablet Take 1 tablet (10 mg total) by mouth daily at bedtime 30 tablet 0    furosemide (LASIX) 40 mg tablet Take by mouth      glucose blood (FREESTYLE LITE) test strip by In Vitro route      JANUVIA 100 MG tablet TAKE 1 TABLET DAILY  90 tablet 3    metFORMIN (GLUCOPHAGE) 1000 MG tablet TAKE 1 TABLET TWICE DAILY  180 tablet 3    Multiple Vitamin (MULTIVITAMINS PO) Take 1 capsule by mouth daily      simvastatin (ZOCOR) 40 mg tablet TAKE 1 TABLET EVERY DAY 90 tablet 3     No current facility-administered medications on file prior to visit        Social History     Social History    Marital status:      Spouse name: N/A    Number of children: N/A    Years of education: N/A     Occupational History    Currently works part-time      Social History Main Topics    Smoking status: Former Smoker     Quit date: 2/1/1968    Smokeless tobacco: Never Used      Comment: Per allscripts - never smoker  Secondhand smoke exposire    Alcohol use Yes      Comment: occasional    Drug use: Unknown    Sexual activity: Not on file     Other Topics Concern    Not on file     Social History Narrative    No narrative on file         I have reviewed the past medical, surgical, social and family history, medications and allergies as documented in the EMR  Review of systems: ROS is negative other than that noted in the HPI  Constitutional: Negative for fatigue and fever  HENT: Negative for sore throat  Respiratory: Negative for shortness of breath  Cardiovascular: Negative for chest pain  Gastrointestinal: Negative for abdominal pain  Endocrine: Negative for cold intolerance and heat intolerance  Genitourinary: Negative for flank pain  Musculoskeletal: Negative for back pain  Skin: Negative for rash  Allergic/Immunologic: Negative for immunocompromised state  Neurological: Negative for dizziness  Psychiatric/Behavioral: Negative for agitation  Physical Exam:    Blood pressure 146/72, pulse 67, height 5' 4" (1 626 m), weight 99 3 kg (219 lb)      General/Constitutional: NAD, well developed, well nourished  HENT: Normocephalic, atraumatic  CV: Intact distal pulses, regular rate  Resp: No respiratory distress or labored breathing  Lymphatic: No lymphadenopathy palpated  Neuro: Alert and Oriented x 3, no focal deficits  Psych: Normal mood, normal affect, normal judgement, normal behavior  Skin: Warm, dry, no rashes, no erythema      Knee Exam (focused):                RIGHT LEFT   ROM:   0-130 3-95   Palpation: Effusion negative negative     MJL tenderness Negative Negative     LJL tenderness Negative Negative   Instability: Varus stable stable     Valgus stable stable   Special Tests: Lachman Negative Negative     Posterior drawer Negative Negative     Anterior drawer Negative Negative     Pivot shift not tested not tested     Dial not tested not tested   Patella: Palpation no tenderness lateral facet ttp     Mobility 1/4 1/4     Apprehension Negative Negative   Other: Single leg 1/4 squat not tested not tested      LE NV Exam: +2 DP/PT pulses bilaterally  Sensation intact to light touch L2-S1 bilaterally     Bilateral hip ROM demonstrates no pain actively or passively    No calf tenderness to palpation bilaterally    Knee Imaging    X-rays of the bilateral knee were reviewed, which demonstrates moderate tricompartmental osteoarthritis  I have reviewed the radiology report and agree with their impression      Vascular lower limb duplex study reviewed which was normal        Large joint arthrocentesis  Date/Time: 12/17/2018 8:52 AM  Site marked: site marked  Timeout: Immediately prior to procedure a time out was called to verify the correct patient, procedure, equipment, support staff and site/side marked as required   Supporting Documentation  Indications: pain   Procedure Details  Location: knee - L knee  Preparation: Patient was prepped and draped in the usual sterile fashion  Needle size: 22 G  Approach: lateral  Medications administered: 4 mL lidocaine 1 %; 1 mL methylPREDNISolone acetate 40 mg/mL    Patient tolerance: patient tolerated the procedure well with no immediate complications  Dressing:  Sterile dressing applied          Scribe Attestation    I,:   Pablito Le am acting as a scribe while in the presence of the attending physician :        I,:   Cody Coffey DO personally performed the services described in this documentation    as scribed in my presence :

## 2018-12-17 NOTE — LETTER
December 17, 2018     MARLA [address]  [address]    Patient: Joi Glover   YOB: 1945   Date of Visit: 12/17/2018       Dear Dr Isaac Ko:    Thank you for referring Olya Hanson to me for evaluation  Below are my notes for this consultation  If you have questions, please do not hesitate to call me  I look forward to following your patient along with you  Sincerely,        Rosemarie Lozoya DO        CC: No Recipients  Rosemarie Lozoya DO  12/17/2018  8:55 AM  Sign at close encounter  Ortho Sports Medicine Knee Visit     Assesment:   bilateral knee tricompartmental osteoarthritis with acute onset of left knee pain  Plan:    Conservative treatment:    Ice to knee for 20 minutes at least 1-2 times daily  PT for ROM/strengthening to knee, hip and core  OTC NSAIDS prn for pain  Weight loss discussed  Keep the knee straight whenever possilble  Let pain guide gradual return activities  Imaging: All imaging from today was reviewed by myself and explained to the patient  Injection:    The risks and benefits of the injection (which include but are not limited to: infection, bleeding,damage to nerve/artery, need for further intervention), as well as the risks and benefits of all alternative treatments were explained and understood  The patient elected to proceed with injection  The procedure was done with aseptic technique, and the patient tolerated the procedure well with no complications  A corticosteroid injection was performed  The patient should take 1-2 days off of activity, with gradual return to activity as tolerated  Ice to the knee 1-2 times daily for 20 minutes, for next 24-48 hrs  May consider future viscosupplementation injection depending on clinical exam/imaging  Surgery:     No surgery is recommended at this point, continue with conservative treatment plan as noted        Follow up:    Return in about 8 weeks (around 2/11/2019) for Recheck  Chief Complaint   Patient presents with    Left Knee - Pain    Right Knee - Pain       History of Present Illness: The patient is a 75 y o  female whose occupation is , referred to me by their primary care physician, seen in clinic for consultation of left knee pain  Pain is located anterior, posterior, lateral   The patient rates the pain as a 10/10  The pain has been present for 6 weeks  The patient denies an injury  The mechanism of injury was uknown  The pain is characterized as dull, achy  The pain is present at all times  Pain is improved by rest, ice and NSAIDS  Pain is aggravated by weight bearing, walking and sitting  Symptoms include clicking, catching and cracking  The patient has tried rest, ice and NSAIDS  Knee Surgical History:  Right knee surgery 15 years ago      Past Medical, Social and Family History:  Past Medical History:   Diagnosis Date    Arrhythmia     Diabetes mellitus ([facility] [address] )     Hyperlipidemia     Hypertension     Type 2 diabetes mellitus with hyperglycemia ([facility] [address] )     last assessed: 9/19/2014     Past Surgical History:   Procedure Laterality Date    APPENDECTOMY      BREAST SURGERY      Reduction Procedure bilateral    KNEE SURGERY      LITHOTRIPSY      renal    TONSILLECTOMY AND ADENOIDECTOMY       Allergies   Allergen Reactions    Penicillins Anaphylaxis     Current Outpatient Prescriptions on File Prior to Visit   Medication Sig Dispense Refill    amLODIPine (NORVASC) 5 mg tablet TAKE 1 TABLET DAILY 90 tablet 3    atenolol (TENORMIN) 25 mg tablet Take 1 tablet by mouth      Blood Glucose Monitoring Suppl (FREESTYLE FREEDOM LITE) w/Device KIT by Does not apply route      citalopram (CeleXA) 20 mg tablet Take 20 mg by mouth daily  3    cyclobenzaprine (FLEXERIL) 10 mg tablet Take 1 tablet (10 mg total) by mouth daily at bedtime 30 tablet 0    furosemide (LASIX) 40 mg tablet Take by mouth      glucose blood (FREESTYLE LITE) test strip by In Vitro route      JANUVIA 100 MG tablet TAKE 1 TABLET DAILY  90 tablet 3    metFORMIN (GLUCOPHAGE) 1000 MG tablet TAKE 1 TABLET TWICE DAILY  180 tablet 3    Multiple Vitamin (MULTIVITAMINS PO) Take 1 capsule by mouth daily      simvastatin (ZOCOR) 40 mg tablet TAKE 1 TABLET EVERY DAY 90 tablet 3     No current facility-administered medications on file prior to visit  Social History     Social History    Marital status:      Spouse name: N/A    Number of children: N/A    Years of education: N/A     Occupational History    Currently works part-time      Social History Main Topics    Smoking status: Former Smoker     Quit date: 2/1/1968    Smokeless tobacco: Never Used      Comment: Per allscripts - never smoker  Secondhand smoke exposire    Alcohol use Yes      Comment: occasional    Drug use: Unknown    Sexual activity: Not on file     Other Topics Concern    Not on file     Social History Narrative    No narrative on file         I have reviewed the past medical, surgical, social and family history, medications and allergies as documented in the EMR  Review of systems: ROS is negative other than that noted in the HPI  Constitutional: Negative for fatigue and fever  HENT: Negative for sore throat  Respiratory: Negative for shortness of breath  Cardiovascular: Negative for chest pain  Gastrointestinal: Negative for abdominal pain  Endocrine: Negative for cold intolerance and heat intolerance  Genitourinary: Negative for flank pain  Musculoskeletal: Negative for back pain  Skin: Negative for rash  Allergic/Immunologic: Negative for immunocompromised state  Neurological: Negative for dizziness  Psychiatric/Behavioral: Negative for agitation  Physical Exam:    Blood pressure 146/72, pulse 67, height 5' 4" (1 626 m), weight 99 3 kg (219 lb)      General/Constitutional: NAD, well developed, well nourished  HENT: Normocephalic, atraumatic  CV: Intact distal pulses, regular rate  Resp: No respiratory distress or labored breathing  Lymphatic: No lymphadenopathy palpated  Neuro: Alert and Oriented x 3, no focal deficits  Psych: Normal mood, normal affect, normal judgement, normal behavior  Skin: Warm, dry, no rashes, no erythema      Knee Exam (focused):                RIGHT LEFT   ROM:   0-130 3-95   Palpation: Effusion negative negative     MJL tenderness Negative Negative     LJL tenderness Negative Negative   Instability: Varus stable stable     Valgus stable stable   Special Tests: Lachman Negative Negative     Posterior drawer Negative Negative     Anterior drawer Negative Negative     Pivot shift not tested not tested     Dial not tested not tested   Patella: Palpation no tenderness lateral facet ttp     Mobility 1/4 1/4     Apprehension Negative Negative   Other: Single leg 1/4 squat not tested not tested      LE NV Exam: +2 DP/PT pulses bilaterally  Sensation intact to light touch L2-S1 bilaterally     Bilateral hip ROM demonstrates no pain actively or passively    No calf tenderness to palpation bilaterally    Knee Imaging    X-rays of the bilateral knee were reviewed, which demonstrates moderate tricompartmental osteoarthritis  I have reviewed the radiology report and agree with their impression      Vascular lower limb duplex study reviewed which was normal        Large joint arthrocentesis  Date/Time: 12/17/2018 8:52 AM  Site marked: site marked  Timeout: Immediately prior to procedure a time out was called to verify the correct patient, procedure, equipment, support staff and site/side marked as required   Supporting Documentation  Indications: pain   Procedure Details  Location: knee - L knee  Preparation: Patient was prepped and draped in the usual sterile fashion  Needle size: 22 G  Approach: lateral  Medications administered: 4 mL lidocaine 1 %; 1 mL methylPREDNISolone acetate 40 mg/mL    Patient tolerance: patient tolerated the procedure well with no immediate complications  Dressing:  Sterile dressing applied          Scribe Attestation    I,:   Sabine Muhammad am acting as a scribe while in the presence of the attending physician :        I,:   Ivelisse Talley,  personally performed the services described in this documentation    as scribed in my presence :

## 2019-01-11 DIAGNOSIS — I10 HYPERTENSION, UNSPECIFIED TYPE: Primary | ICD-10-CM

## 2019-01-11 RX ORDER — ATENOLOL 25 MG/1
TABLET ORAL
Qty: 90 TABLET | Refills: 3 | Status: SHIPPED | OUTPATIENT
Start: 2019-01-11 | End: 2020-01-19

## 2019-01-14 ENCOUNTER — OFFICE VISIT (OUTPATIENT)
Dept: OBGYN CLINIC | Facility: MEDICAL CENTER | Age: 74
End: 2019-01-14
Payer: COMMERCIAL

## 2019-01-14 VITALS
DIASTOLIC BLOOD PRESSURE: 81 MMHG | HEIGHT: 64 IN | BODY MASS INDEX: 38.24 KG/M2 | WEIGHT: 224 LBS | HEART RATE: 67 BPM | SYSTOLIC BLOOD PRESSURE: 163 MMHG

## 2019-01-14 DIAGNOSIS — M17.12 PRIMARY OSTEOARTHRITIS OF LEFT KNEE: Primary | ICD-10-CM

## 2019-01-14 PROCEDURE — 99213 OFFICE O/P EST LOW 20 MIN: CPT | Performed by: ORTHOPAEDIC SURGERY

## 2019-01-14 NOTE — PROGRESS NOTES
Ortho Sports Medicine Knee Visit     Assesment:     bilateral knee tricompartmental osteoarthritis, left more symptomatic than right    Plan:    Conservative treatment:    Ice to knee for 20 minutes at least 1-2 times daily  Let pain guide gradual return activities  Home exercise program discussed with the patient today  A prescription for diclofenac gel was given to the patient, which she will start using over the coming weeks  Imaging: All imaging from today was reviewed by myself and explained to the patient  Injection:    A viscosupplementation injection was ordered and will be given at a future visit  Surgery:     No surgery is recommended at this point, continue with conservative treatment plan as noted  History of Present Illness: The patient is returns for follow up of bilateral knees, left greater than right  Since the prior visit, She reports minimal improvement  Pain is located anterior, lateral, medial      Pain is improved by rest, ice, NSAIDS and injection  Pain is aggravated by weight bearing  Symptoms include swelling  The patient has tried rest, ice, NSAIDS and physical therapy  I have reviewed the past medical, surgical, social and family history, medications and allergies as documented in the EMR  Review of systems: ROS is negative other than that noted in the HPI  Constitutional: Negative for fatigue and fever  Physical Exam:    Blood pressure 163/81, pulse 67, height 5' 4" (1 626 m), weight 102 kg (224 lb)      General/Constitutional: NAD, well developed, well nourished  HENT: Normocephalic, atraumatic  CV: Intact distal pulses, regular rate  Resp: No respiratory distress or labored breathing  Lymphatic: No lymphadenopathy palpated  Neuro: Alert and Oriented x 3, no focal deficits  Psych: Normal mood, normal affect, normal judgement, normal behavior  Skin: Warm, dry, no rashes, no erythema       Knee Exam (focused):                  RIGHT LEFT   ROM:   0-130 3-105   Palpation: Effusion negative minimal     MJL tenderness Negative Positive     LJL tenderness Negative Positive   Instability: Varus stable stable     Valgus stable stable   Special Tests: Lachman Negative Negative     Posterior drawer Negative Negative     Anterior drawer Negative Negative     Pivot shift not tested not tested     Dial not tested not tested   Patella: Palpation no tenderness no tenderness     Mobility 1/4 1/4     Apprehension Negative Negative   Other: Single leg 1/4 squat not tested not tested      LE NV Exam: +2 DP/PT pulses bilaterally  Sensation intact to light touch L2-S1 bilaterally    No calf tenderness to palpation bilaterally      Knee Imaging    No imaging was performed today

## 2019-01-14 NOTE — LETTER
January 14, 2019     MARLA [address]  [place] [address] [employer]    Patient: Vanessa Sun   YOB: 1945   Date of Visit: 1/14/2019       Dear Dr Leann Vicente:    Thank you for referring Ab Calderon to me for evaluation  Below are my notes for this consultation  If you have questions, please do not hesitate to call me  I look forward to following your patient along with you  Sincerely,        Brianda Escobar DO        CC: No Recipients  Brianda Escobar DO  1/14/2019 11:06 AM  Sign at close encounter  Ortho Sports Medicine Knee Visit     Assesment:     bilateral knee tricompartmental osteoarthritis, left more symptomatic than right    Plan:    Conservative treatment:    Ice to knee for 20 minutes at least 1-2 times daily  Let pain guide gradual return activities  Home exercise program discussed with the patient today  A prescription for diclofenac gel was given to the patient, which she will start using over the coming weeks  Imaging: All imaging from today was reviewed by myself and explained to the patient  Injection:    A viscosupplementation injection was ordered and will be given at a future visit  Surgery:     No surgery is recommended at this point, continue with conservative treatment plan as noted  History of Present Illness: The patient is returns for follow up of bilateral knees, left greater than right  Since the prior visit, She reports minimal improvement  Pain is located anterior, lateral, medial      Pain is improved by rest, ice, NSAIDS and injection  Pain is aggravated by weight bearing  Symptoms include swelling  The patient has tried rest, ice, NSAIDS and physical therapy  I have reviewed the past medical, surgical, social and family history, medications and allergies as documented in the EMR  Review of systems: ROS is negative other than that noted in the HPI  Constitutional: Negative for fatigue and fever  Physical Exam:    Blood pressure 163/81, pulse 67, height 5' 4" (1 626 m), weight 102 kg (224 lb)      General/Constitutional: NAD, well developed, well nourished  HENT: Normocephalic, atraumatic  CV: Intact distal pulses, regular rate  Resp: No respiratory distress or labored breathing  Lymphatic: No lymphadenopathy palpated  Neuro: Alert and Oriented x 3, no focal deficits  Psych: Normal mood, normal affect, normal judgement, normal behavior  Skin: Warm, dry, no rashes, no erythema       Knee Exam (focused):                  RIGHT LEFT   ROM:   0-130 3-105   Palpation: Effusion negative minimal     MJL tenderness Negative Positive     LJL tenderness Negative Positive   Instability: Varus stable stable     Valgus stable stable   Special Tests: Lachman Negative Negative     Posterior drawer Negative Negative     Anterior drawer Negative Negative     Pivot shift not tested not tested     Dial not tested not tested   Patella: Palpation no tenderness no tenderness     Mobility 1/4 1/4     Apprehension Negative Negative   Other: Single leg 1/4 squat not tested not tested      LE NV Exam: +2 DP/PT pulses bilaterally  Sensation intact to light touch L2-S1 bilaterally    No calf tenderness to palpation bilaterally      Knee Imaging    No imaging was performed today

## 2019-02-11 ENCOUNTER — TELEPHONE (OUTPATIENT)
Dept: FAMILY MEDICINE CLINIC | Facility: CLINIC | Age: 74
End: 2019-02-11

## 2019-02-11 NOTE — TELEPHONE ENCOUNTER
[employer]li[employer] called in regards to pt's [employer] and prior auth needed  Confirmed we rec'd form hey faxed over the weekend  They asked that it be completed ASAP as it is time sensitive

## 2019-02-13 DIAGNOSIS — E11.9 TYPE 2 DIABETES MELLITUS WITHOUT COMPLICATION, WITHOUT LONG-TERM CURRENT USE OF INSULIN (HCC): ICD-10-CM

## 2019-02-18 ENCOUNTER — OFFICE VISIT (OUTPATIENT)
Dept: OBGYN CLINIC | Facility: MEDICAL CENTER | Age: 74
End: 2019-02-18
Payer: COMMERCIAL

## 2019-02-18 VITALS
HEART RATE: 65 BPM | WEIGHT: 224 LBS | SYSTOLIC BLOOD PRESSURE: 152 MMHG | BODY MASS INDEX: 38.24 KG/M2 | DIASTOLIC BLOOD PRESSURE: 74 MMHG | HEIGHT: 64 IN

## 2019-02-18 DIAGNOSIS — M17.12 PRIMARY OSTEOARTHRITIS OF LEFT KNEE: Primary | ICD-10-CM

## 2019-02-18 PROCEDURE — 20610 DRAIN/INJ JOINT/BURSA W/O US: CPT | Performed by: ORTHOPAEDIC SURGERY

## 2019-02-18 NOTE — PROGRESS NOTES
Ortho Sports Medicine Knee Follow Up Visit     Assesment:   left knee tricompartmental osteoarthritis    Plan:        Conservative treatment:    Ice to knee for 20 minutes at least 1-2 times daily  OTC NSAIDS prn for pain  Let pain guide gradual return activities  Imaging:    No imaging was available for review today  Injection:    The risks and benefits of the injection (which include but are not limited to: infection, bleeding,damage to nerve/artery, need for further intervention), as well as the risks and benefits of all alternative treatments were explained and understood  The patient elected to proceed with injection  The procedure was done with aseptic technique, and the patient tolerated the procedure well with no complications  A viscosupplementation injection was performed  The patient should take 1-2 days off of activity, with gradual return to activity as tolerated  Ice to the knee 1-2 times daily for 20 minutes, for next 24-48 hrs  Surgery:     No surgery is recommended at this point, continue with conservative treatment plan as noted  Follow up:    Return if symptoms worsen or fail to improve  Chief Complaint   Patient presents with    Left Knee - Injections       History of Present Illness: The patient is returns for follow up of left knee  Patient is here today for left knee Synvisc One injection  Pain is located anterior, lateral, medial      Pain is improved by rest, ice, NSAIDS and injection  Pain is aggravated by weight bearing and walking  Symptoms include swelling  The patient has tried rest, ice, NSAIDS, physical therapy and injection  I have reviewed the past medical, surgical, social and family history, medications and allergies as documented in the EMR  Review of systems: ROS is negative other than that noted in the HPI  Constitutional: Negative for fatigue and fever        Physical Exam:    Blood pressure 152/74, pulse 65, height 5' 4" (1 626 m), weight 102 kg (224 lb)      General/Constitutional: NAD, well developed, well nourished  HENT: Normocephalic, atraumatic  CV: Intact distal pulses, regular rate  Resp: No respiratory distress or labored breathing  Lymphatic: No lymphadenopathy palpated  Neuro: Alert and Oriented x 3, no focal deficits  Psych: Normal mood, normal affect, normal judgement, normal behavior  Skin: Warm, dry, no rashes, no erythema       Knee Exam (focused):                  RIGHT LEFT   ROM:   0-130 0-130   Palpation: Effusion negative negative     MJL tenderness Negative Negative     LJL tenderness Negative Negative   Instability: Varus stable stable     Valgus stable stable   Special Tests: Lachman Negative Negative     Posterior drawer Negative Negative     Anterior drawer Negative Negative     Pivot shift not tested not tested     Dial not tested not tested   Patella: Palpation no tenderness no tenderness     Mobility 1/4 1/4     Apprehension Negative Negative   Other: Single leg 1/4 squat not tested not tested      LE NV Exam: +2 DP/PT pulses bilaterally  Sensation intact to light touch L2-S1 bilaterally    No calf tenderness to palpation bilaterally      Knee Imaging    No imaging was performed today    Large joint arthrocentesis: L knee  Date/Time: 2/18/2019 8:27 AM  Consent given by: patient  Site marked: site marked  Timeout: Immediately prior to procedure a time out was called to verify the correct patient, procedure, equipment, support staff and site/side marked as required   Supporting Documentation  Indications: pain   Procedure Details  Location: knee - L knee  Needle size: 22 G  Ultrasound guidance: no  Approach: anterolateral  Medications administered: 48 mg hylan 48 MG/6ML    Patient tolerance: patient tolerated the procedure well with no immediate complications  Dressing:  Sterile dressing applied

## 2019-02-18 NOTE — LETTER
February 18, 2019     MARLA [address] 1990  [address] [employer]    Patient: Ridge Smith   YOB: 1945   Date of Visit: 2/18/2019       Dear Dr Thomasine Seip:    Thank you for referring Monique Berger to me for evaluation  Below are my notes for this consultation  If you have questions, please do not hesitate to call me  I look forward to following your patient along with you  Sincerely,        Spenser Tijerina DO        CC: No Recipients  Spenser Tijerina DO  2/18/2019 12:08 PM  Sign at close encounter  Ortho Sports Medicine Knee Follow Up Visit     Assesment:   left knee tricompartmental osteoarthritis    Plan:        Conservative treatment:    Ice to knee for 20 minutes at least 1-2 times daily  OTC NSAIDS prn for pain  Let pain guide gradual return activities  Imaging:    No imaging was available for review today  Injection:    The risks and benefits of the injection (which include but are not limited to: infection, bleeding,damage to nerve/artery, need for further intervention), as well as the risks and benefits of all alternative treatments were explained and understood  The patient elected to proceed with injection  The procedure was done with aseptic technique, and the patient tolerated the procedure well with no complications  A viscosupplementation injection was performed  The patient should take 1-2 days off of activity, with gradual return to activity as tolerated  Ice to the knee 1-2 times daily for 20 minutes, for next 24-48 hrs  Surgery:     No surgery is recommended at this point, continue with conservative treatment plan as noted  Follow up:    Return if symptoms worsen or fail to improve  Chief Complaint   Patient presents with    Left Knee - Injections       History of Present Illness: The patient is returns for follow up of left knee  Patient is here today for left knee Synvisc One injection     Pain is located anterior, lateral, medial  Pain is improved by rest, ice, NSAIDS and injection  Pain is aggravated by weight bearing and walking  Symptoms include swelling  The patient has tried rest, ice, NSAIDS, physical therapy and injection  I have reviewed the past medical, surgical, social and family history, medications and allergies as documented in the EMR  Review of systems: ROS is negative other than that noted in the HPI  Constitutional: Negative for fatigue and fever  Physical Exam:    Blood pressure 152/74, pulse 65, height 5' 4" (1 626 m), weight 102 kg (224 lb)      General/Constitutional: NAD, well developed, well nourished  HENT: Normocephalic, atraumatic  CV: Intact distal pulses, regular rate  Resp: No respiratory distress or labored breathing  Lymphatic: No lymphadenopathy palpated  Neuro: Alert and Oriented x 3, no focal deficits  Psych: Normal mood, normal affect, normal judgement, normal behavior  Skin: Warm, dry, no rashes, no erythema       Knee Exam (focused):                  RIGHT LEFT   ROM:   0-130 0-130   Palpation: Effusion negative negative     MJL tenderness Negative Negative     LJL tenderness Negative Negative   Instability: Varus stable stable     Valgus stable stable   Special Tests: Lachman Negative Negative     Posterior drawer Negative Negative     Anterior drawer Negative Negative     Pivot shift not tested not tested     Dial not tested not tested   Patella: Palpation no tenderness no tenderness     Mobility 1/4 1/4     Apprehension Negative Negative   Other: Single leg 1/4 squat not tested not tested      LE NV Exam: +2 DP/PT pulses bilaterally  Sensation intact to light touch L2-S1 bilaterally    No calf tenderness to palpation bilaterally      Knee Imaging    No imaging was performed today    Large joint arthrocentesis: L knee  Date/Time: 2/18/2019 8:27 AM  Consent given by: patient  Site marked: site marked  Timeout: Immediately prior to procedure a time out was called to verify the correct patient, procedure, equipment, support staff and site/side marked as required   Supporting Documentation  Indications: pain   Procedure Details  Location: knee - L knee  Needle size: 22 G  Ultrasound guidance: no  Approach: anterolateral  Medications administered: 48 mg hylan 48 MG/6ML    Patient tolerance: patient tolerated the procedure well with no immediate complications  Dressing:  Sterile dressing applied

## 2019-02-23 ENCOUNTER — TELEPHONE (OUTPATIENT)
Dept: FAMILY MEDICINE CLINIC | Facility: CLINIC | Age: 74
End: 2019-02-23

## 2019-03-05 DIAGNOSIS — F32.9 REACTIVE DEPRESSION: ICD-10-CM

## 2019-03-05 RX ORDER — CITALOPRAM 20 MG/1
TABLET ORAL
Qty: 90 TABLET | Refills: 3 | Status: SHIPPED | OUTPATIENT
Start: 2019-03-05 | End: 2020-02-29

## 2019-04-22 DIAGNOSIS — I10 BENIGN ESSENTIAL HYPERTENSION: ICD-10-CM

## 2019-04-22 RX ORDER — AMLODIPINE BESYLATE 5 MG/1
TABLET ORAL
Qty: 90 TABLET | Refills: 3 | Status: SHIPPED | OUTPATIENT
Start: 2019-04-22 | End: 2020-04-16

## 2019-06-13 ENCOUNTER — OFFICE VISIT (OUTPATIENT)
Dept: FAMILY MEDICINE CLINIC | Facility: CLINIC | Age: 74
End: 2019-06-13
Payer: COMMERCIAL

## 2019-06-13 VITALS
SYSTOLIC BLOOD PRESSURE: 118 MMHG | WEIGHT: 221 LBS | BODY MASS INDEX: 37.73 KG/M2 | TEMPERATURE: 98.6 F | HEART RATE: 68 BPM | DIASTOLIC BLOOD PRESSURE: 60 MMHG | HEIGHT: 64 IN

## 2019-06-13 DIAGNOSIS — G47.62 NOCTURNAL LEG CRAMPS: ICD-10-CM

## 2019-06-13 DIAGNOSIS — E11.65 UNCONTROLLED TYPE 2 DIABETES MELLITUS WITH HYPERGLYCEMIA (HCC): ICD-10-CM

## 2019-06-13 DIAGNOSIS — E11.9 TYPE 2 DIABETES MELLITUS WITHOUT COMPLICATION, WITHOUT LONG-TERM CURRENT USE OF INSULIN (HCC): ICD-10-CM

## 2019-06-13 DIAGNOSIS — Z01.818 PREOP GENERAL PHYSICAL EXAM: Primary | ICD-10-CM

## 2019-06-13 DIAGNOSIS — I10 ESSENTIAL HYPERTENSION: ICD-10-CM

## 2019-06-13 DIAGNOSIS — M17.12 PRIMARY OSTEOARTHRITIS OF LEFT KNEE: ICD-10-CM

## 2019-06-13 DIAGNOSIS — Z23 NEED FOR VACCINATION AGAINST STREPTOCOCCUS PNEUMONIAE USING PNEUMOCOCCAL CONJUGATE VACCINE 13: ICD-10-CM

## 2019-06-13 LAB
LEFT EYE DIABETIC RETINOPATHY: NORMAL
LEFT EYE IMAGE QUALITY: NORMAL
LEFT EYE MACULAR EDEMA: NORMAL
LEFT EYE OTHER RETINOPATHY: NORMAL
RIGHT EYE DIABETIC RETINOPATHY: NORMAL
RIGHT EYE IMAGE QUALITY: NORMAL
RIGHT EYE MACULAR EDEMA: NORMAL
RIGHT EYE OTHER RETINOPATHY: NORMAL
SEVERITY (EYE EXAM): NORMAL

## 2019-06-13 PROCEDURE — 3074F SYST BP LT 130 MM HG: CPT | Performed by: FAMILY MEDICINE

## 2019-06-13 PROCEDURE — 3078F DIAST BP <80 MM HG: CPT | Performed by: FAMILY MEDICINE

## 2019-06-13 PROCEDURE — 99214 OFFICE O/P EST MOD 30 MIN: CPT | Performed by: FAMILY MEDICINE

## 2019-06-13 PROCEDURE — 3072F LOW RISK FOR RETINOPATHY: CPT | Performed by: FAMILY MEDICINE

## 2019-06-13 PROCEDURE — 4040F PNEUMOC VAC/ADMIN/RCVD: CPT

## 2019-06-13 PROCEDURE — 1160F RVW MEDS BY RX/DR IN RCRD: CPT | Performed by: FAMILY MEDICINE

## 2019-06-13 PROCEDURE — 1036F TOBACCO NON-USER: CPT | Performed by: FAMILY MEDICINE

## 2019-06-13 PROCEDURE — G0009 ADMIN PNEUMOCOCCAL VACCINE: HCPCS

## 2019-06-13 PROCEDURE — 90670 PCV13 VACCINE IM: CPT

## 2019-06-13 RX ORDER — CYCLOBENZAPRINE HCL 10 MG
10 TABLET ORAL
Qty: 30 TABLET | Refills: 0 | Status: SHIPPED | OUTPATIENT
Start: 2019-06-13 | End: 2021-08-17

## 2019-09-01 DIAGNOSIS — E11.9 TYPE 2 DIABETES MELLITUS WITHOUT COMPLICATION, WITHOUT LONG-TERM CURRENT USE OF INSULIN (HCC): ICD-10-CM

## 2019-09-02 RX ORDER — SITAGLIPTIN 100 MG/1
TABLET, FILM COATED ORAL
Qty: 90 TABLET | Refills: 1 | Status: SHIPPED | OUTPATIENT
Start: 2019-09-02 | End: 2020-03-02

## 2019-09-03 DIAGNOSIS — E78.2 MIXED HYPERLIPIDEMIA: ICD-10-CM

## 2019-09-03 DIAGNOSIS — E11.9 TYPE 2 DIABETES MELLITUS WITHOUT COMPLICATION, WITHOUT LONG-TERM CURRENT USE OF INSULIN (HCC): ICD-10-CM

## 2019-09-03 RX ORDER — SIMVASTATIN 40 MG
TABLET ORAL
Qty: 90 TABLET | Refills: 3 | Status: SHIPPED | OUTPATIENT
Start: 2019-09-03 | End: 2020-08-31

## 2019-10-09 ENCOUNTER — APPOINTMENT (OUTPATIENT)
Dept: RADIOLOGY | Facility: CLINIC | Age: 74
End: 2019-10-09
Payer: COMMERCIAL

## 2019-10-09 ENCOUNTER — OFFICE VISIT (OUTPATIENT)
Dept: URGENT CARE | Facility: CLINIC | Age: 74
End: 2019-10-09
Payer: COMMERCIAL

## 2019-10-09 VITALS — OXYGEN SATURATION: 97 % | RESPIRATION RATE: 20 BRPM | TEMPERATURE: 98.1 F | HEART RATE: 87 BPM

## 2019-10-09 DIAGNOSIS — S69.92XA INJURY OF FINGER OF LEFT HAND, INITIAL ENCOUNTER: ICD-10-CM

## 2019-10-09 DIAGNOSIS — S62.615A CLOSED DISPLACED FRACTURE OF PROXIMAL PHALANX OF LEFT RING FINGER, INITIAL ENCOUNTER: Primary | ICD-10-CM

## 2019-10-09 PROCEDURE — 99212 OFFICE O/P EST SF 10 MIN: CPT | Performed by: PHYSICIAN ASSISTANT

## 2019-10-09 PROCEDURE — 29130 APPL FINGER SPLINT STATIC: CPT | Performed by: PHYSICIAN ASSISTANT

## 2019-10-09 PROCEDURE — S9088 SERVICES PROVIDED IN URGENT: HCPCS | Performed by: PHYSICIAN ASSISTANT

## 2019-10-09 PROCEDURE — 73130 X-RAY EXAM OF HAND: CPT

## 2019-10-09 NOTE — PATIENT INSTRUCTIONS
Wear splint for protection  May remove to bathe  Elevate hand to control pain and swelling  May take ES Tylenol as needed      Ortho referral

## 2019-10-09 NOTE — PROGRESS NOTES
[facility]    NAME: Hector Quinones is a 75 y o  female  : 1945    MRN: [id number]  DATE: 2019  TIME: 4:11 PM    Assessment and Plan   Closed displaced fracture of proximal phalanx of left ring finger, initial encounter [B50 [id number]]  1  Closed displaced fracture of proximal phalanx of left ring finger, initial encounter  XR hand 3+ vw left    Ambulatory referral to Orthopedic Surgery    Splint    Splint application      X-ray left hand: oblique partially displaced fracture proximal phalanx LLF  No other acute bony changes noted  Nurse applied rigid finger splint to pt's LLF  Patient Instructions   Patient Instructions   Wear splint for protection  May remove to bathe  Elevate hand to control pain and swelling  May take ES Tylenol as needed  Ortho referral       Chief Complaint     Chief Complaint   Patient presents with    Finger Injury     Pt states 1 week ago while walking her dog, the lease was wrapped around left hand, do tried running away  Pt c/o pain and swelling to left 4th digit  History of Present Illness   Hector Quinones presents to the clinic c/o  48-year-old right-hand-dominant female with pain in her left hand, especially left ring finger  Started last Friday when her hand got tangled in her dog's leash  It became swollen black and blue and difficult to move  She has been icing it but it does not seem to be getting better  Her daughter finally talked her into coming in to be seen  Pain is worse at [facility]  Throbs if held in dependent position  Review of Systems   Review of Systems   Constitutional: Positive for activity change  Negative for appetite change, chills and fever  Musculoskeletal: Positive for joint swelling  Skin: Positive for color change         Current Medications     Long-Term Medications   Medication Sig Dispense Refill    amLODIPine (NORVASC) 5 mg tablet TAKE 1 TABLET BY MOUTH EVERY DAY 90 tablet 3    atenolol (TENORMIN) 25 mg tablet TAKE 1 TABLET BEDTIME 90 tablet 3    citalopram (CeleXA) 20 mg tablet TAKE 1 TABLET BY MOUTH EVERY DAY 90 tablet 3    furosemide (LASIX) 40 mg tablet Take by mouth      JANUVIA 100 MG tablet TAKE 1 TABLET BY MOUTH EVERY DAY 90 tablet 1    metFORMIN (GLUCOPHAGE) 1000 MG tablet TAKE 1 TABLET TWICE DAILY  180 tablet 3    simvastatin (ZOCOR) 40 mg tablet TAKE 1 TABLET BY MOUTH EVERY DAY 90 tablet 3    Blood Glucose Monitoring Suppl (FREESTYLE FREEDOM LITE) w/Device KIT by Does not apply route      cyclobenzaprine (FLEXERIL) 10 mg tablet Take 1 tablet (10 mg total) by mouth daily at bedtime (Patient not taking: Reported on 10/9/2019) 30 tablet 0       Current Allergies     Allergies as of 10/09/2019 - Reviewed 10/09/2019   Allergen Reaction Noted    Penicillins Anaphylaxis 05/24/2012          The following portions of the patient's history were reviewed and updated as appropriate: allergies, current medications, past family history, past medical history, past social history, past surgical history and problem list   Past Medical History:   Diagnosis Date    Arrhythmia     Diabetes [facility]     Hyperlipidemia     Hypertension     Type 2 diabetes mellitus with hyperglycemia ([facility] )     last assessed: 9/19/2014     Past Surgical History:   Procedure Laterality Date    APPENDECTOMY      BREAST SURGERY      Reduction Procedure bilateral    KNEE SURGERY      LITHOTRIPSY      renal    TONSILLECTOMY AND ADENOIDECTOMY       Family History   Problem Relation Age of Onset    Diabetes Mother     Hypertension Mother     Colon cancer Father     Stroke Father        Objective   Pulse 87   Temp 98 1 °F (36 7 °C) (Tympanic)   Resp 20   SpO2 97%   No LMP recorded  Patient has had a hysterectomy  Physical Exam     Physical Exam   Constitutional: She is oriented to person, place, and time  She appears well-developed and well-nourished  No distress  Cardiovascular: Normal rate     Pulmonary/Chest: Effort normal    Musculoskeletal:        Left hand: She exhibits decreased range of motion, tenderness and bony tenderness  Hands:  Left hand with swelling worse at 4th digit and fading contusion involving 3rd 4th 5th digits  TTP worse around proximal phalanx 4th digit however 3rd 4th 5th proximal phalanx, MCP joints and metacarpal bones  Significant limited range of motion LRF and with inability to  with left hand  Thumb and index finger nontender to palpation  No obvious bony deformities  Neurological: She is alert and oriented to person, place, and time  Skin: Skin is warm and dry  No rash noted  She is not diaphoretic  Psychiatric: She has a normal mood and affect  Nursing note and vitals reviewed

## 2019-10-09 NOTE — PROGRESS NOTES
Splint application  Date/Time: 10/9/2019 4:06 PM  Performed by: Laura Dias RN  Authorized by: Teresa Munoz PA-C     Consent:     Consent obtained:  Verbal    Consent given by:  Patient    Risks discussed:  Discoloration, numbness, pain and swelling  Pre-procedure details:     Sensation:  Normal  Procedure details:     Laterality:  Left    Location:  Finger    Finger:  L ring finger    Strapping: no  Cast type:  Finger    Splint type:  Finger splint, static    Supplies:  Aluminum splint and elastic bandage  Post-procedure details:     Pain:  Improved    Sensation:  Normal    Patient tolerance of procedure:   Tolerated well, no immediate complications

## 2019-10-10 ENCOUNTER — TELEPHONE (OUTPATIENT)
Dept: OBGYN CLINIC | Facility: HOSPITAL | Age: 74
End: 2019-10-10

## 2019-10-10 NOTE — TELEPHONE ENCOUNTER
Force request sent to [facility]  n/i left hand fx/disp , no prev surg, xray epic, [employer] remy Can only drive to [place]

## 2019-10-11 ENCOUNTER — OFFICE VISIT (OUTPATIENT)
Dept: OBGYN CLINIC | Facility: MEDICAL CENTER | Age: 74
End: 2019-10-11
Payer: COMMERCIAL

## 2019-10-11 ENCOUNTER — APPOINTMENT (OUTPATIENT)
Dept: RADIOLOGY | Facility: MEDICAL CENTER | Age: 74
End: 2019-10-11
Payer: COMMERCIAL

## 2019-10-11 VITALS
HEIGHT: 64 IN | HEART RATE: 67 BPM | WEIGHT: 215 LBS | SYSTOLIC BLOOD PRESSURE: 170 MMHG | BODY MASS INDEX: 36.7 KG/M2 | DIASTOLIC BLOOD PRESSURE: 76 MMHG

## 2019-10-11 DIAGNOSIS — M79.642 LEFT HAND PAIN: ICD-10-CM

## 2019-10-11 DIAGNOSIS — S62.615A CLOSED DISPLACED FRACTURE OF PROXIMAL PHALANX OF LEFT RING FINGER, INITIAL ENCOUNTER: ICD-10-CM

## 2019-10-11 DIAGNOSIS — M79.642 LEFT HAND PAIN: Primary | ICD-10-CM

## 2019-10-11 PROCEDURE — 73120 X-RAY EXAM OF HAND: CPT

## 2019-10-11 PROCEDURE — 99214 OFFICE O/P EST MOD 30 MIN: CPT | Performed by: ORTHOPAEDIC SURGERY

## 2019-10-11 PROCEDURE — 29075 APPL CST ELBW FNGR SHORT ARM: CPT | Performed by: ORTHOPAEDIC SURGERY

## 2019-10-11 RX ORDER — CLINDAMYCIN PHOSPHATE 900 MG/50ML
900 INJECTION INTRAVENOUS ONCE
Status: CANCELLED | OUTPATIENT
Start: 2019-10-11 | End: 2019-10-11

## 2019-10-11 NOTE — H&P (VIEW-ONLY)
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 [employer] who presents with left ring finger pain  She notes that last Friday she was walking her dog and wrapped a lesion around her finger and the dog ran and pulled her finger  She had immediate pop  There is significant pain  She notes no bleeding  Denies any numbness or tingling  She said her daughter put her into a homemade splint and she went to the urgent care on the following Wednesday  They took x-rays and told her there was a fracture and wrapped her up  She notes that there was no antecedent pain this finger  She notes no previous injury  Movement makes the pain worse  Resting makes it better            Past Medical History:   Diagnosis Date    Arrhythmia     [facility]     Hyperlipidemia     Hypertension     Type 2 diabetes mellitus with hyperglycemia ([facility] )     last assessed: 9/19/2014       Past Surgical History:   Procedure Laterality Date    APPENDECTOMY      BREAST SURGERY      Reduction Procedure bilateral    KNEE SURGERY      LITHOTRIPSY      renal    TONSILLECTOMY AND ADENOIDECTOMY         Allergies   Allergen Reactions    Penicillins Anaphylaxis       Current Outpatient Medications on File Prior to Visit   Medication Sig Dispense Refill    amLODIPine (NORVASC) 5 mg tablet TAKE 1 TABLET BY MOUTH EVERY DAY 90 tablet 3    atenolol (TENORMIN) 25 mg tablet TAKE 1 TABLET BEDTIME 90 tablet 3    Blood Glucose Monitoring Suppl (FREESTYLE FREEDOM LITE) w/Device KIT by Does not apply route      citalopram (CeleXA) 20 mg tablet TAKE 1 TABLET BY MOUTH EVERY DAY 90 tablet 3    furosemide (LASIX) 40 mg tablet Take by mouth      glucose blood (FREESTYLE LITE) test strip by In Vitro route      JANUVIA 100 MG tablet TAKE 1 TABLET BY MOUTH EVERY DAY 90 tablet 1    metFORMIN (GLUCOPHAGE) 1000 MG tablet TAKE 1 TABLET TWICE DAILY  180 tablet 3    Multiple Vitamin (MULTIVITAMINS PO) Take 1 capsule by mouth daily      simvastatin (ZOCOR) 40 mg tablet TAKE 1 TABLET BY MOUTH EVERY DAY 90 tablet 3    cyclobenzaprine (FLEXERIL) 10 mg tablet Take 1 tablet (10 mg total) by mouth daily at bedtime (Patient not taking: Reported on 10/9/2019) 30 tablet 0     No current facility-administered medications on file prior to visit  Social History     Tobacco Use    Smoking status: Former Smoker     Last attempt to quit: 1968     Years since quittin 7    Smokeless tobacco: Never Used    Tobacco comment: Per allscripts - never smoker  Secondhand smoke exposire   Substance Use Topics    Alcohol use: Yes     Comment: occasional    Drug use: Never       Family History   Problem Relation Age of Onset    Diabetes Mother     Hypertension Mother     Colon cancer Father     Stroke Father        Review of Systems     As stated in the HPI  All other systems were reviewed and are negative  Physical Exam     /76   Pulse 67   Ht 5' 4" (1 626 m)   Wt 97 5 kg (215 lb)   BMI 36 90 kg/m²     GENERAL: This is a well-developed, well-nourished, age-appropriate patient in no acute distress  The patient is alert and oriented x3  Pleasant and cooperative  Eyes: Anicteric sclerae  Extraocular movements appear intact  HENT: Nares are patent with no drainage  Lungs: There is equal chest rise on inspection  Breathing is non-labored with no audible wheezing  Cardiovascular: No cyanosis  No upper extremity lymphadema  Skin: Skin is warm to touch  No obvious skin lesions or rashes other than described below  Neurologic: No ataxia  Psychiatric: Mood and affect are appropriate  Examination left upper extremity reveals radial deviation and rotational deformity to the left ring finger  Sensation intact to light touch in the radial ulnar aspect of all digits and brisk capillary refill to all digits  5/5 motor in all distal motor groups  She fires FDP and FDS to the ring finger though there is little excursion secondary to pain  Negative Tinel's at the carpal tunnel  Tenderness at the fracture site at the proximal phalanx of the ring finger  Data Review     Results Reviewed     None             Imaging:  Two view radiographs taken in the office and reviewed by me today shows evidence of a displaced shortened and malrotated left ring finger proximal phalanx fracture    Assessment and Plan      Diagnoses and all orders for this visit:    Left hand pain  -     XR hand 2 vw left; Future    Closed displaced fracture of proximal phalanx of left ring finger, initial encounter  -     Ambulatory referral to Orthopedic Surgery  -     Case request operating room: Closed reduction percutaneous pinning versus open reduction internal fixation left ring finger fracture; Standing  -     Case request operating room: Closed reduction percutaneous pinning versus open reduction internal fixation left ring finger fracture    Other orders  -     Void on call to OR; Standing  -     Insert peripheral IV; Standing  -     Diet NPO; Sips with meds; Standing  -     clindamycin (CLEOCIN) IVPB (premix) 900 mg           65-year-old female with a displaced proximal phalanx fracture of the left ring finger resulting in rotational and coronal plane deformities to the finger  She is a fairly active woman and hold a job that requires the use of her hands consistently  I have discussed with her that there is a possibility of completing nonoperative management and allowing the bone to heal   It certainly would heal over time, but in this position resulting in deformity  I have also discussed with her that there is a bony spike that is likely impeding the flexor tendon glide and that nonoperative modalities would include range of motion once the bone healed, but this may be limited secondary to this deformity    I did also discussed with her that surgical intervention would improve the alignment of her finger and likely allow her to start mobilization much earlier with less discomfort  I discussed closed management with percutaneous pinning as the preferred method allergy for fixing this fracture though I also discussed maybe necessary  We discussed the risks and benefits of the surgery including bleeding, infection, damage to surrounding neurovascular structures, stiffness, pain, nonunion, malunion, the need for repeat surgery  Discussed that pin removal will be about 4 weeks in the office  We would start her moving her finger quite readily after surgery  She has elected to undergo surgical intervention we will schedule accordingly  We placed her into an ulnar gutter splint today for soft tissue rest in preparation for surgery  I also discussed that following up with her primary care provider would be helpful and root to surgery, though given that she was medically optimized in June for a total knee arthroplasty and has not had any change in her health, it is unlikely that she will have any barriers to the operating room for this low risk procedure  Follow Up:  10-14 days postoperatively    To Do Next Visit:  X-rays out of splint, two view left ring finger    PROCEDURES PERFORMED:  Splint application  Date/Time: 10/11/2019 12:24 PM  Performed by: Chris Norman MD  Authorized by:  Chris Norman MD     Procedure details:     Laterality:  Left    Location:  Hand    Hand:  L hand    Splint type:  Ulnar gutter    Supplies:  Cotton padding, Ortho-Glass and elastic bandage

## 2019-10-11 NOTE — PROGRESS NOTES
Chief Complaint     Left ring finger pain      History of the Present Illness     Christopher Villasenor is a 75 y o  female right-hand-dominant female who works as a grill preparatory employee at [employer] who presents with left ring finger pain  She notes that last Friday she was walking her dog and wrapped a lesion around her finger and the dog ran and pulled her finger  She had immediate pop  There is significant pain  She notes no bleeding  Denies any numbness or tingling  She said her daughter put her into a homemade splint and she went to the urgent care on the following Wednesday  They took x-rays and told her there was a fracture and wrapped her up  She notes that there was no antecedent pain this finger  She notes no previous injury  Movement makes the pain worse  Resting makes it better            Past Medical History:   Diagnosis Date    Arrhythmia     [facility]     Hyperlipidemia     Hypertension     Type 2 [facility] with hyperglycemia ([facility] )     last assessed: 9/19/2014       Past Surgical History:   Procedure Laterality Date    APPENDECTOMY      BREAST SURGERY      Reduction Procedure bilateral    KNEE SURGERY      LITHOTRIPSY      renal    TONSILLECTOMY AND ADENOIDECTOMY         Allergies   Allergen Reactions    Penicillins Anaphylaxis       Current Outpatient Medications on File Prior to Visit   Medication Sig Dispense Refill    amLODIPine (NORVASC) 5 mg tablet TAKE 1 TABLET BY MOUTH EVERY DAY 90 tablet 3    atenolol (TENORMIN) 25 mg tablet TAKE 1 TABLET BEDTIME 90 tablet 3    Blood Glucose Monitoring Suppl (FREESTYLE FREEDOM LITE) w/Device KIT by Does not apply route      citalopram (CeleXA) 20 mg tablet TAKE 1 TABLET BY MOUTH EVERY DAY 90 tablet 3    furosemide (LASIX) 40 mg tablet Take by mouth      glucose blood (FREESTYLE LITE) test strip by In Vitro route      JANUVIA 100 MG tablet TAKE 1 TABLET BY MOUTH EVERY DAY 90 tablet 1    metFORMIN (GLUCOPHAGE) 1000 MG tablet TAKE 1 TABLET TWICE DAILY  180 tablet 3    Multiple Vitamin (MULTIVITAMINS PO) Take 1 capsule by mouth daily      simvastatin (ZOCOR) 40 mg tablet TAKE 1 TABLET BY MOUTH EVERY DAY 90 tablet 3    cyclobenzaprine (FLEXERIL) 10 mg tablet Take 1 tablet (10 mg total) by mouth daily at bedtime (Patient not taking: Reported on 10/9/2019) 30 tablet 0     No current facility-administered medications on file prior to visit  Social History     Tobacco Use    Smoking status: Former Smoker     Last attempt to quit: 1968     Years since quittin 7    Smokeless tobacco: Never Used    Tobacco comment: Per allscripts - never smoker  Secondhand smoke exposire   Substance Use Topics    Alcohol use: Yes     Comment: occasional    Drug use: Never       Family History   Problem Relation Age of Onset    Diabetes Mother     Hypertension Mother     Colon cancer Father     Stroke Father        Review of Systems     As stated in the HPI  All other systems were reviewed and are negative  Physical Exam     /75   Pulse 67   Ht 5' 4" (1 626 m)   Wt 97 5 kg (215 lb)   BMI 36 90 kg/m²     GENERAL: This is a well-developed, well-nourished, age-appropriate patient in no acute distress  The patient is alert and oriented x3  Pleasant and cooperative  Eyes: Anicteric sclerae  Extraocular movements appear intact  HENT: Nares are patent with no drainage  Lungs: There is equal chest rise on inspection  Breathing is non-labored with no audible wheezing  Cardiovascular: No cyanosis  No upper extremity lymphadema  Skin: Skin is warm to touch  No obvious skin lesions or rashes other than described below  Neurologic: No ataxia  Psychiatric: Mood and affect are appropriate  Examination left upper extremity reveals radial deviation and rotational deformity to the left ring finger  Sensation intact to light touch in the radial ulnar aspect of all digits and brisk capillary refill to all digits  5/5 motor in all distal motor groups  She fires FDP and FDS to the ring finger though there is little excursion secondary to pain  Negative Tinel's at the carpal tunnel  Tenderness at the fracture site at the proximal phalanx of the ring finger  Data Review     Results Reviewed     None             Imaging:  Two view radiographs taken in the office and reviewed by me today shows evidence of a displaced shortened and malrotated left ring finger proximal phalanx fracture    Assessment and Plan      Diagnoses and all orders for this visit:    Left hand pain  -     XR hand 2 vw left; Future    Closed displaced fracture of proximal phalanx of left ring finger, initial encounter  -     Ambulatory referral to Orthopedic Surgery  -     Case request operating room: Closed reduction percutaneous pinning versus open reduction internal fixation left ring finger fracture; Standing  -     Case request operating room: Closed reduction percutaneous pinning versus open reduction internal fixation left ring finger fracture    Other orders  -     Void on call to OR; Standing  -     Insert peripheral IV; Standing  -     Diet NPO; Sips with meds; Standing  -     clindamycin (CLEOCIN) IVPB (premix) 900 mg           76-year-old female with a displaced proximal phalanx fracture of the left ring finger resulting in rotational and coronal plane deformities to the finger  She is a fairly active woman and hold a job that requires the use of her hands consistently  I have discussed with her that there is a possibility of completing nonoperative management and allowing the bone to heal   It certainly would heal over time, but in this position resulting in deformity  I have also discussed with her that there is a bony spike that is likely impeding the flexor tendon glide and that nonoperative modalities would include range of motion once the bone healed, but this may be limited secondary to this deformity    I did also discussed with her that surgical intervention would improve the alignment of her finger and likely allow her to start mobilization much earlier with less discomfort  I discussed closed management with percutaneous pinning as the preferred method allergy for fixing this fracture though I also discussed maybe necessary  We discussed the risks and benefits of the surgery including bleeding, infection, damage to surrounding neurovascular structures, stiffness, pain, nonunion, malunion, the need for repeat surgery  Discussed that pin removal will be about 4 weeks in the office  We would start her moving her finger quite readily after surgery  She has elected to undergo surgical intervention we will schedule accordingly  We placed her into an ulnar gutter splint today for soft tissue rest in preparation for surgery  I also discussed that following up with her primary care provider would be helpful and root to surgery, though given that she was medically optimized in June for a total knee arthroplasty and has not had any change in her health, it is unlikely that she will have any barriers to the operating room for this low risk procedure  Follow Up:  10-14 days postoperatively    To Do Next Visit:  X-rays out of splint, two view left ring finger    PROCEDURES PERFORMED:  Splint application  Date/Time: 10/11/2019 12:24 PM  Performed by: Kentrell Hale MD  Authorized by:  Kentrell Hale MD     Procedure details:     Laterality:  Left    Location:  Hand    Hand:  L hand    Splint type:  Ulnar gutter    Supplies:  Cotton padding, Ortho-Glass and elastic bandage

## 2019-10-14 ENCOUNTER — ANESTHESIA EVENT (OUTPATIENT)
Dept: PERIOP | Facility: HOSPITAL | Age: 74
End: 2019-10-14
Payer: COMMERCIAL

## 2019-10-14 ENCOUNTER — TELEPHONE (OUTPATIENT)
Dept: FAMILY MEDICINE CLINIC | Facility: CLINIC | Age: 74
End: 2019-10-14

## 2019-10-14 NOTE — ANESTHESIA PREPROCEDURE EVALUATION
Review of Systems/Medical History  Patient summary reviewed  Chart reviewed      Cardiovascular  EKG reviewed, Negative cardio ROS Exercise tolerance (METS): >4,  Hyperlipidemia, Hypertension controlled, Dysrhythmias ,   Comment: TENormin taken HS   LBBB W/U neg for cardiac diesease   No F/U needed   Remains asymptomatic,  Pulmonary  Smoker ex-smoker  , No COPD , No asthma ,        GI/Hepatic  Negative GI/hepatic ROS          Negative  ROS        Endo/Other  Diabetes well controlled type 2 Oral agent,   Obesity  morbid obesity   GYN       Hematology  Negative hematology ROS      Musculoskeletal    Arthritis     Neurology  Negative neurology ROS      Psychology   Anxiety, Depression , depressed and being treated for depression,              Physical Exam    Airway    Mallampati score: II  TM Distance: >3 FB  Neck ROM: limited     Dental       Cardiovascular  Comment: Negative ROS, Cardiovascular exam normal    Pulmonary  Pulmonary exam normal     Other Findings  Fixed upper and lower teeth and in good repair      Anesthesia Plan  ASA Score- 3     Anesthesia Type- general with ASA Monitors  Additional Monitors:   Airway Plan: LMA  Plan Factors-Patient not instructed to abstain from smoking on day of procedure  Patient did not smoke on day of surgery  Induction- intravenous  Postoperative Plan- Plan for postoperative opioid use  Informed Consent- Anesthetic plan and risks discussed with patient and daughter  I personally reviewed this patient with the CRNA  Discussed and agreed on the Anesthesia Plan with the CRNA  [facility]

## 2019-10-14 NOTE — TELEPHONE ENCOUNTER
Patient is having minor surgery on her ring finger L hand tomorrow and the surgeon just wants her to inform us  She had labs done in June and surgeon says they were perfect   Please call her if there are any issues with her proceeding with planned surgery [phone]

## 2019-10-15 ENCOUNTER — TELEPHONE (OUTPATIENT)
Dept: OBGYN CLINIC | Facility: MEDICAL CENTER | Age: 74
End: 2019-10-15

## 2019-10-15 ENCOUNTER — HOSPITAL ENCOUNTER (OUTPATIENT)
Facility: HOSPITAL | Age: 74
Setting detail: OUTPATIENT SURGERY
Discharge: HOME | End: 2019-10-15
Attending: ORTHOPAEDIC SURGERY | Admitting: ORTHOPAEDIC SURGERY
Payer: COMMERCIAL

## 2019-10-15 ENCOUNTER — ANESTHESIA (OUTPATIENT)
Dept: PERIOP | Facility: HOSPITAL | Age: 74
End: 2019-10-15
Payer: COMMERCIAL

## 2019-10-15 ENCOUNTER — APPOINTMENT (OUTPATIENT)
Dept: RADIOLOGY | Facility: HOSPITAL | Age: 74
End: 2019-10-15
Payer: COMMERCIAL

## 2019-10-15 VITALS
RESPIRATION RATE: 13 BRPM | SYSTOLIC BLOOD PRESSURE: 157 MMHG | TEMPERATURE: 98.6 F | DIASTOLIC BLOOD PRESSURE: 70 MMHG | OXYGEN SATURATION: 96 % | HEART RATE: 61 BPM

## 2019-10-15 DIAGNOSIS — S62.615D CLOSED DISPLACED FRACTURE OF PROXIMAL PHALANX OF LEFT RING FINGER WITH ROUTINE HEALING, SUBSEQUENT ENCOUNTER: Primary | ICD-10-CM

## 2019-10-15 LAB
GLUCOSE SERPL-MCNC: 106 MG/DL (ref 65–140)
GLUCOSE SERPL-MCNC: 113 MG/DL (ref 65–140)

## 2019-10-15 PROCEDURE — 26727 TREAT FINGER FRACTURE EACH: CPT | Performed by: ORTHOPAEDIC SURGERY

## 2019-10-15 PROCEDURE — 73140 X-RAY EXAM OF FINGER(S): CPT

## 2019-10-15 PROCEDURE — 82948 REAGENT STRIP/BLOOD GLUCOSE: CPT

## 2019-10-15 PROCEDURE — C1713 ANCHOR/SCREW BN/BN,TIS/BN: HCPCS | Performed by: ORTHOPAEDIC SURGERY

## 2019-10-15 DEVICE — K-WIRE DBL END BAYONET .045X9IN: Type: IMPLANTABLE DEVICE | Site: FINGER | Status: FUNCTIONAL

## 2019-10-15 RX ORDER — CLINDAMYCIN PHOSPHATE 900 MG/50ML
INJECTION INTRAVENOUS AS NEEDED
Status: DISCONTINUED | OUTPATIENT
Start: 2019-10-15 | End: 2019-10-15 | Stop reason: SURG

## 2019-10-15 RX ORDER — CLINDAMYCIN PHOSPHATE 900 MG/50ML
900 INJECTION INTRAVENOUS ONCE
Status: DISCONTINUED | OUTPATIENT
Start: 2019-10-15 | End: 2019-10-15 | Stop reason: HOSPADM

## 2019-10-15 RX ORDER — OXYCODONE HYDROCHLORIDE 5 MG/1
2.5 TABLET ORAL EVERY 6 HOURS PRN
Qty: 10 TABLET | Refills: 0 | Status: SHIPPED | OUTPATIENT
Start: 2019-10-15 | End: 2019-10-25

## 2019-10-15 RX ORDER — DEXAMETHASONE SODIUM PHOSPHATE 4 MG/ML
INJECTION, SOLUTION INTRA-ARTICULAR; INTRALESIONAL; INTRAMUSCULAR; INTRAVENOUS; SOFT TISSUE AS NEEDED
Status: DISCONTINUED | OUTPATIENT
Start: 2019-10-15 | End: 2019-10-15 | Stop reason: SURG

## 2019-10-15 RX ORDER — FENTANYL CITRATE/PF 50 MCG/ML
12.5 SYRINGE (ML) INJECTION
Status: DISCONTINUED | OUTPATIENT
Start: 2019-10-15 | End: 2019-10-15 | Stop reason: HOSPADM

## 2019-10-15 RX ORDER — FENTANYL CITRATE/PF 50 MCG/ML
25 SYRINGE (ML) INJECTION
Status: DISCONTINUED | OUTPATIENT
Start: 2019-10-15 | End: 2019-10-15 | Stop reason: HOSPADM

## 2019-10-15 RX ORDER — SODIUM CHLORIDE, SODIUM LACTATE, POTASSIUM CHLORIDE, CALCIUM CHLORIDE 600; 310; 30; 20 MG/100ML; MG/100ML; MG/100ML; MG/100ML
INJECTION, SOLUTION INTRAVENOUS CONTINUOUS PRN
Status: DISCONTINUED | OUTPATIENT
Start: 2019-10-15 | End: 2019-10-15 | Stop reason: SURG

## 2019-10-15 RX ORDER — SODIUM CHLORIDE, SODIUM LACTATE, POTASSIUM CHLORIDE, CALCIUM CHLORIDE 600; 310; 30; 20 MG/100ML; MG/100ML; MG/100ML; MG/100ML
75 INJECTION, SOLUTION INTRAVENOUS CONTINUOUS
Status: DISCONTINUED | OUTPATIENT
Start: 2019-10-15 | End: 2019-10-15 | Stop reason: HOSPADM

## 2019-10-15 RX ORDER — PROPOFOL 10 MG/ML
INJECTION, EMULSION INTRAVENOUS AS NEEDED
Status: DISCONTINUED | OUTPATIENT
Start: 2019-10-15 | End: 2019-10-15 | Stop reason: SURG

## 2019-10-15 RX ORDER — BUPIVACAINE HYDROCHLORIDE 5 MG/ML
INJECTION, SOLUTION PERINEURAL AS NEEDED
Status: DISCONTINUED | OUTPATIENT
Start: 2019-10-15 | End: 2019-10-15 | Stop reason: HOSPADM

## 2019-10-15 RX ORDER — DEXAMETHASONE SODIUM PHOSPHATE 4 MG/ML
4 INJECTION, SOLUTION INTRA-ARTICULAR; INTRALESIONAL; INTRAMUSCULAR; INTRAVENOUS; SOFT TISSUE ONCE AS NEEDED
Status: DISCONTINUED | OUTPATIENT
Start: 2019-10-15 | End: 2019-10-15 | Stop reason: HOSPADM

## 2019-10-15 RX ORDER — ONDANSETRON 2 MG/ML
INJECTION INTRAMUSCULAR; INTRAVENOUS AS NEEDED
Status: DISCONTINUED | OUTPATIENT
Start: 2019-10-15 | End: 2019-10-15 | Stop reason: SURG

## 2019-10-15 RX ORDER — OXYCODONE HYDROCHLORIDE 5 MG/1
2.5 TABLET ORAL EVERY 6 HOURS PRN
Status: DISCONTINUED | OUTPATIENT
Start: 2019-10-15 | End: 2019-10-15 | Stop reason: HOSPADM

## 2019-10-15 RX ORDER — MAGNESIUM HYDROXIDE 1200 MG/15ML
LIQUID ORAL AS NEEDED
Status: DISCONTINUED | OUTPATIENT
Start: 2019-10-15 | End: 2019-10-15 | Stop reason: HOSPADM

## 2019-10-15 RX ORDER — FENTANYL CITRATE 50 UG/ML
INJECTION, SOLUTION INTRAMUSCULAR; INTRAVENOUS AS NEEDED
Status: DISCONTINUED | OUTPATIENT
Start: 2019-10-15 | End: 2019-10-15 | Stop reason: SURG

## 2019-10-15 RX ORDER — DIPHENHYDRAMINE HYDROCHLORIDE 50 MG/ML
12.5 INJECTION INTRAMUSCULAR; INTRAVENOUS ONCE
Status: DISCONTINUED | OUTPATIENT
Start: 2019-10-15 | End: 2019-10-15 | Stop reason: HOSPADM

## 2019-10-15 RX ORDER — LIDOCAINE HYDROCHLORIDE 10 MG/ML
INJECTION, SOLUTION INFILTRATION; PERINEURAL AS NEEDED
Status: DISCONTINUED | OUTPATIENT
Start: 2019-10-15 | End: 2019-10-15 | Stop reason: SURG

## 2019-10-15 RX ADMIN — OXYCODONE HYDROCHLORIDE 2.5 MG: 5 TABLET ORAL at 10:29

## 2019-10-15 RX ADMIN — DEXAMETHASONE SODIUM PHOSPHATE 4 MG: 4 INJECTION, SOLUTION INTRAMUSCULAR; INTRAVENOUS at 07:53

## 2019-10-15 RX ADMIN — SODIUM CHLORIDE, SODIUM LACTATE, POTASSIUM CHLORIDE, AND CALCIUM CHLORIDE: .6; .31; .03; .02 INJECTION, SOLUTION INTRAVENOUS at 07:36

## 2019-10-15 RX ADMIN — CLINDAMYCIN IN 5 PERCENT DEXTROSE 900 MG: 18 INJECTION, SOLUTION INTRAVENOUS at 07:45

## 2019-10-15 RX ADMIN — PROPOFOL 125 MG: 10 INJECTION, EMULSION INTRAVENOUS at 07:42

## 2019-10-15 RX ADMIN — FENTANYL CITRATE 50 MCG: 50 INJECTION INTRAMUSCULAR; INTRAVENOUS at 07:42

## 2019-10-15 RX ADMIN — LIDOCAINE HYDROCHLORIDE 50 MG: 10 INJECTION, SOLUTION INFILTRATION; PERINEURAL at 07:42

## 2019-10-15 RX ADMIN — ONDANSETRON 4 MG: 2 INJECTION INTRAMUSCULAR; INTRAVENOUS at 07:54

## 2019-10-15 RX ADMIN — FENTANYL CITRATE 50 MCG: 50 INJECTION INTRAMUSCULAR; INTRAVENOUS at 07:40

## 2019-10-15 NOTE — ANESTHESIA POSTPROCEDURE EVALUATION
Post-Op Assessment Note    CV Status:  Stable  Pain Score: 1    Pain management: adequate     Mental Status:  Alert and awake   Hydration Status:  Euvolemic   PONV Controlled:  Controlled   Airway Patency:  Patent   Post Op Vitals Reviewed: Yes      Staff: Anesthesiologist   Comments: LMA without any post op airway issues          /78 (10/15/19 0850)    Temp 98 2 °F (36 8 °C) (10/15/19 0845)    Pulse 69 (10/15/19 0850)   Resp 17 (10/15/19 0850)    SpO2 92 % (10/15/19 0850)

## 2019-10-15 NOTE — DISCHARGE INSTRUCTIONS
Alessandro Arredondo - Dr Santi Mott (Orthopedic Surgery)    Follow-up Appointments   Please call to set up your first postoperative visit with Dr Yulissa Meek on the date listed on the first page of your discharge instructions  Dressing and Wound Care   A dressing has been placed on your hand/arm to keep the incisions clean  Keep your dressing clean and dry  o Do not remove the surgical dressing/splint  It will be removed at your first postoperative office visit with the doctor or therapist     Aditya Pulling a plastic bag over your dressing/splint whenever you take a shower or bath until you are allowed to remove it   Swelling is normal after surgery  Elevate your hand/arm so the surgical site is above your heart to decrease the swelling  Swelling is like water, it runs downhill  This is especially important for the first 72 hours after surgery  o The best way to elevate your hand/arm is with your fingers pointing towards the ceiling and your hand/arm above the level of the heart   o You can use pillows to help prop your hand/arm up when sitting or lying down   If you are experiencing pain, be sure you are elevating your hand/arm as often as possible   Apply an ice pack over your dressing/splint for 20 minutes of every hour for the first 3 days when you are awake  This can help to reduce swelling and inflammation  Be sure the ice pack is waterproof so it does not leak on the dressing/splint  A simple ice pack can be made by adding ten cubes and a small amount of water in a small zip-lock bag  Seal this small bag tightly  Place this small bag in a larger zip lock bag  Apply to the area in pain   If the dressing feels too tight in spite of elevation, loosen the outer wrap but do not remove the entire dressing     If you have exposed pins/wires, take clean gauze or a cotton tip applicator (like a Q-tip), get it wet in clean warm water mixed with non-scented hand soap (like RustyDereck Onaga, etc ), and gently wipe around the base of the pin where it comes through the skin once a day  Do not use water from a well to clean as this has bacteria in it and can contribute to infections  ACTIVITIES:  [facility] and straighten the parts of your hand, wrist, elbow, and shoulder that are not included in your surgical dressing or splint  Do this at least 6 times a day, as this will help decrease swelling and speed up your recovery  This includes your fingers  See the instructions listed below for finger motion  THIS IS VERY IMPORTANT FOR YOUR RECOVERY! POSTOPERATIVE CARE/CONCERNS:  Zion Ding You may experience some temporary numbness in your fingers   You should have very little to no bleeding on your dressing   Notify the office (see contact info at bottom of page) for any of the following:  o Excessive pain not relieved by rest, elevation, and pain medications  o Feeling that the dressing is too tight in spite of adequately elevating hand/arm  o Active bleeding through the dressing  o Drainage from the wound site or pin sites  o Foul odor from the dressing/wound  o Temperature greater that 101? F or chills  o Blue or excessively cold fingertips  o Numbness of the fingertips that does not improve in spite of adequately elevating hand/arm    PAIN MEDICATION:   Pain is a normal part of the recovery after surgery  The pain medication provided to you will help to decrease the discomfort but will not completely eliminate the pain   A prescription for a narcotic pain medicine (oxycodone or hydrocodone) and anti-inflammatory (naproxen) were called in to your pharmacy  Please take the anti-inflammatory medication AND over-the-counter acetaminophen (Tylenol) regularly  The instructions will be listed on the bottles  The narcotic pain medicine should be used for pain that is not controlled by these other medications and only for the first few days after surgery   The narcotic is HIGHLY ADDICTIVE and has many side effects such as causing constipation, dizziness, confusion, decreased breathing and more  It is safe to take for a short period of time after surgery  It is almost never prescribed for longer than a few weeks and never after one month for elective surgeries   Do not take narcotic or anti-inflammatories on an empty stomach   It is illegal to drive while taking narcotic pain medication   Your pain should decrease over the first few days after surgery which will allow you to take less pain medicine, increase the time between doses of medication, or stop taking all pain medicine        OFFICE CONTACT NUMBERS   Please call [phone] with any questions about appointments or any medical concerns

## 2019-10-15 NOTE — TELEPHONE ENCOUNTER
Dr Darnell   #: [phone]       Patient called in returning 's phone call   Please advise, thank you

## 2019-10-15 NOTE — OP NOTE
DATE OF SURGERY: 10/15/2019    SURGEON: Edwin Pérez MD    PREOPERATIVE DIAGNOSIS:  Left ring finger proximal phalanx fracture    POSTOPERATIVE DIAGNOSIS:  Same    PROCEDURE:  Closed reduction percutaneous pinning left ring finger proximal phalanx fracture    IMPLANTS:  0 045 in Moon wires x2    ASSISTANTS:  None    ANESTHESIA: General    ESTIMATED BLOOD LOSS: Minimal     INTRAVENOUS FLUIDS:  Per anesthesia    URINE OUTPUT:  No Steele    TOURNIQUET TIME: * Missing tourniquet times found for documented tourniquets in lo *      COMPLICATIONS:  None    ANTIBIOTICS:  900 mg clindamycin    SPECIMENS: * No specimens in log *         INDICATIONS: Salinas Licea is a 75y o  year old female who sustained the above injuries  The indications for operative intervention were a displaced ring finger proximal phalanx fracture that is preventing flexor tendon glide and resulting in a malrotated digit  The alternatives to operative intervention included splinting and delayed mobilization  The risks of the operative procedure were discussed in detail with the patient and her friend including but not limited to the risks of anesthesia, infection, injury to blood vessel/nerve, pain, malunion, nonunion, painful hardware, stiffness, changes in sensation, and the need for repeat surgery  The patient understood these risks and alternatives and elected to proceed with surgery  DESCRIPTION OF PROCEDURE: The patient was seen in the preoperative holding area and identification was performed as per the institution's protocol  The patient was then brought to the operating room, a briefing was held and prophylactic antibiotics were given  Anesthesia was induced  A tourniquet cuff was applied to the operative extremity, set at 250 mmHg The site was pre-scrubbed with chlorhexidine and prepped with ChloraPrep and draped in the usual sterile fashion         Following a timeout procedure, manual reduction techniques were used to achieve length alignment rotation of fracture under biplanar fluoroscopic views  A point-to-point reduction clamp was then utilized to hold the fracture  Two 0 045 in Moon wires were then introduced at the base of the proximal phalanx in antegrade fashion crossing the fracture and burying into the head of the proximal phalanx  Clinically the digit appeared to be rotated in line with the other digits and was able to flex passively down to the scaphoid  There was full range of motion about the PIP and DIP joint and full range of motion flexion at the MCP joint  A digital block using 0 5% bupivacaine was then infused both volarly and dorsally  Pin caps were applied as well as of volar slab splint  All sharps and sponge counts were correct and there were no complications  I attest that INaomi, was present and scrubbed for the entirety of the procedure  No qualified resident was available to assist with this case  The patient was awoken from anesthesia in good condition and taken to the PACU  PLAN: The patient will be seen back in the office in 10-14 days and initiate early mobilization at that point

## 2019-10-15 NOTE — INTERVAL H&P NOTE
H&P reviewed  After examining the patient I find no changes in the patients condition since the H&P had been written      Vitals:    10/15/19 0625   BP: (!) 187/76   Pulse: 57   Resp: 18   Temp: 97 6 °F (36 4 °C)   SpO2: 95%     TO OR

## 2019-10-25 ENCOUNTER — OFFICE VISIT (OUTPATIENT)
Dept: OBGYN CLINIC | Facility: MEDICAL CENTER | Age: 74
End: 2019-10-25

## 2019-10-25 ENCOUNTER — APPOINTMENT (OUTPATIENT)
Dept: RADIOLOGY | Facility: MEDICAL CENTER | Age: 74
End: 2019-10-25
Payer: COMMERCIAL

## 2019-10-25 VITALS
HEIGHT: 64 IN | DIASTOLIC BLOOD PRESSURE: 76 MMHG | SYSTOLIC BLOOD PRESSURE: 158 MMHG | BODY MASS INDEX: 37.05 KG/M2 | WEIGHT: 217 LBS | HEART RATE: 64 BPM

## 2019-10-25 DIAGNOSIS — S62.615D CLOSED DISPLACED FRACTURE OF PROXIMAL PHALANX OF LEFT RING FINGER WITH ROUTINE HEALING, SUBSEQUENT ENCOUNTER: Primary | ICD-10-CM

## 2019-10-25 DIAGNOSIS — S62.615D CLOSED DISPLACED FRACTURE OF PROXIMAL PHALANX OF LEFT RING FINGER WITH ROUTINE HEALING, SUBSEQUENT ENCOUNTER: ICD-10-CM

## 2019-10-25 PROCEDURE — 99024 POSTOP FOLLOW-UP VISIT: CPT | Performed by: ORTHOPAEDIC SURGERY

## 2019-10-25 PROCEDURE — 73140 X-RAY EXAM OF FINGER(S): CPT

## 2019-10-25 NOTE — PROGRESS NOTES
History of the Present Illness     Edgardo Chen is a 75 y o  female who presents status post closed reduction percutaneous pinning of the left ring finger proximal phalanx fracture  She is doing well with no fevers chills or drainage  No significant pain  Has been complaining her splint with nonweightbearing  Physical Exam     /76   Pulse 64   Ht 5' 4" (1 626 m)   Wt 98 4 kg (217 lb)   BMI 37 25 kg/m²     Examination left upper extremity reveals no rotational deformity to the ring finger  Pin sites are clean dry and intact  She fires her FDP and FDS though with some pain  Excursion at the PIP joint is minimal   She is able to pull through with her FDP on flexion  Brisk capillary refill  Sensation intact to light touch in the radial ulnar aspect of the digit  Data Review       Imaging:  Two view imaging of the left ring finger shows evidence of stable alignment of proximal phalanx fracture with internal fixation is stable alignment    Assessment and Plan       75year-old status post closed reduction percutaneous pinning left ring finger proximal phalanx fracture in good alignment  We will transition her to a hand based ulnar gutter splint and begin active and active assisted range of motion  I put the order in for this  She will return 2 weeks to pull the pins and increase motion      Follow Up:  2 weeks    To Do Next Visit:  Pull pins    PROCEDURES PERFORMED:  Procedures

## 2019-10-28 ENCOUNTER — OFFICE VISIT (OUTPATIENT)
Dept: PHYSICAL THERAPY | Facility: MEDICAL CENTER | Age: 74
End: 2019-10-28
Payer: COMMERCIAL

## 2019-10-28 DIAGNOSIS — S62.615D CLOSED DISPLACED FRACTURE OF PROXIMAL PHALANX OF LEFT RING FINGER WITH ROUTINE HEALING, SUBSEQUENT ENCOUNTER: Primary | ICD-10-CM

## 2019-10-28 PROCEDURE — L3913 HFO W/O JOINTS CF: HCPCS

## 2019-10-28 NOTE — PROGRESS NOTES
Splint    Diagnosis:   1  Closed displaced fracture of proximal phalanx of left ring finger with routine healing, subsequent encounter  Ambulatory referral to PT/OT hand therapy      Indication: Fracture    Location: Right  ring finger and small finger  Supplies: Orthotics  Thermoplastic material    Splint type: Hand Ulnar Gutter  Wearing Schedule: Remove for hygiene only  Describe Position: MCPs in 20 degrees flexion (available range), IPS included  Precautions: Fracture  Pt instructed on gentle A/AAROM exercises out of splint; advised no PROM or any lifting with her R hand  Patient or Caregiver expresses understanding of wearing Schedule and Precautions? Yes  Patient or Caregiver able to don/doff orthotic independently? Yes    Written orders provided to patient?  Yes  Orders Obtained: Written  Orders Obtained from: Kiya Brady MD

## 2019-11-12 ENCOUNTER — OFFICE VISIT (OUTPATIENT)
Dept: OBGYN CLINIC | Facility: MEDICAL CENTER | Age: 74
End: 2019-11-12

## 2019-11-12 ENCOUNTER — APPOINTMENT (OUTPATIENT)
Dept: RADIOLOGY | Facility: MEDICAL CENTER | Age: 74
End: 2019-11-12
Payer: COMMERCIAL

## 2019-11-12 VITALS
HEART RATE: 78 BPM | SYSTOLIC BLOOD PRESSURE: 167 MMHG | HEIGHT: 64 IN | BODY MASS INDEX: 36.54 KG/M2 | DIASTOLIC BLOOD PRESSURE: 74 MMHG | WEIGHT: 214 LBS

## 2019-11-12 DIAGNOSIS — S62.615D CLOSED DISPLACED FRACTURE OF PROXIMAL PHALANX OF LEFT RING FINGER WITH ROUTINE HEALING, SUBSEQUENT ENCOUNTER: ICD-10-CM

## 2019-11-12 DIAGNOSIS — S62.615D CLOSED DISPLACED FRACTURE OF PROXIMAL PHALANX OF LEFT RING FINGER WITH ROUTINE HEALING, SUBSEQUENT ENCOUNTER: Primary | ICD-10-CM

## 2019-11-12 PROCEDURE — 73140 X-RAY EXAM OF FINGER(S): CPT

## 2019-11-12 PROCEDURE — 99024 POSTOP FOLLOW-UP VISIT: CPT | Performed by: ORTHOPAEDIC SURGERY

## 2019-11-12 RX ORDER — SULFAMETHOXAZOLE AND TRIMETHOPRIM 800; 160 MG/1; MG/1
2 TABLET ORAL EVERY 12 HOURS SCHEDULED
Qty: 40 TABLET | Refills: 0 | Status: SHIPPED | OUTPATIENT
Start: 2019-11-12 | End: 2019-11-22

## 2019-11-12 NOTE — PROGRESS NOTES
History of the Present Illness     Sae Bui is a 75 y o  female status post closed reduction percutaneous pinning of the left ring finger proximal phalanx fracture  She noted increasing swelling and redness on the dorsum of her hand on Sunday, but it has gotten worse since then  Denies any fevers or chills  Has been cleaning around the pin sites with soapy water on a Q-tip  Has been wearing her splint  Also been working on range of motion exercises  Physical Exam     /74   Pulse 78   Ht 5' 4" (1 626 m)   Wt 97 1 kg (214 lb)   BMI 36 73 kg/m²     Examination left upper extremity reveals significant erythema and swelling to the dorsum of the hand  Pin sites are nearly incarcerated underneath the swelling  Limited motion about all of her fingers secondary to what appears to be limited effort  5/5 Motor to the APB, FDI, FDP2, FDP5, EDC  Sensation intact to light touch in the median, radial, and ulnar nerve distribution  No tenderness at the wrist or in the finger even at the fracture site, but some tenderness over the erythematous dorsal hand  Data Review       Imaging:  Two view imaging of the left small finger taken in the office and reviewed by me today shows evidence of maintenance of alignment with interval healing of the left ring finger proximal phalanx fracture    Assessment and Plan       69-year-old female with the likely cellulitis at the dorsal left hand at the pin sites status post closed reduction percutaneous pinning of the left ring finger proximal phalanx fracture  We removed the pins today  I counseled her on worsening infectious signs including increased erythema or drainage as well as fevers or chills or mental status changes  I discussed with her that we would like to get her on oral antibiotics immediately and that if anything worsens to go to the hospital and to call me as well  I discussed also working on finger motion exercises    I counseled her again that I am concerned about how stiff she has and that moving her fingers now will be critical to her ultimate outcome  She has or splint to wear also for the time being  We will check up on her in a week after a week of antibiotic therapy        Follow Up:  1 week    To Do Next Visit:     PROCEDURES PERFORMED:  Procedures    none today

## 2019-11-14 ENCOUNTER — TELEPHONE (OUTPATIENT)
Dept: OBGYN CLINIC | Facility: HOSPITAL | Age: 74
End: 2019-11-14

## 2019-11-14 NOTE — TELEPHONE ENCOUNTER
According to Dr Nelda Espinoza response to my email the patient is showing improvement  Advised patient's family member to keep an eye on it and should she stop responding to the abx prescribed or the infx seems worse she is to call us immediately  Verbalized understanding

## 2019-11-14 NOTE — TELEPHONE ENCOUNTER
Spoke to Hollie, she stated she would help the patient get photos and send them to my email   Will pass them along once received thank you

## 2019-11-14 NOTE — TELEPHONE ENCOUNTER
Can you please have her take a few pictures with a cell phone under good lighting with the flash on and send them in to be loaded in the media tab?  Please let me know when they are loaded

## 2019-11-14 NOTE — TELEPHONE ENCOUNTER
Caller: patient  Call back number: [phone]  Patient's doctor: Dr Tawnya Celestin    Patient called stating she is returning a call from you to see how she is doing  She states her arm isn't red anymore but her hand is swollen and red  She states it is warm to the touch and having occasional discharge  Patient is still taking bactrim

## 2019-11-15 NOTE — TELEPHONE ENCOUNTER
I spoke to her daughter last night who says she is improving as well   Will see her in less than a week

## 2019-11-19 ENCOUNTER — OFFICE VISIT (OUTPATIENT)
Dept: OBGYN CLINIC | Facility: MEDICAL CENTER | Age: 74
End: 2019-11-19

## 2019-11-19 VITALS
WEIGHT: 218 LBS | BODY MASS INDEX: 37.22 KG/M2 | SYSTOLIC BLOOD PRESSURE: 156 MMHG | HEART RATE: 73 BPM | DIASTOLIC BLOOD PRESSURE: 69 MMHG | HEIGHT: 64 IN

## 2019-11-19 DIAGNOSIS — S62.615D CLOSED DISPLACED FRACTURE OF PROXIMAL PHALANX OF LEFT RING FINGER WITH ROUTINE HEALING, SUBSEQUENT ENCOUNTER: Primary | ICD-10-CM

## 2019-11-19 DIAGNOSIS — Z98.890 S/P MUSCULOSKELETAL SYSTEM SURGERY: ICD-10-CM

## 2019-11-19 PROCEDURE — 99024 POSTOP FOLLOW-UP VISIT: CPT | Performed by: ORTHOPAEDIC SURGERY

## 2019-11-19 NOTE — PROGRESS NOTES
History of the Present Illness     Brandon Mendoza is a 75 y o  female who presents to the office today 1 months s/p percutaneous pinning left ring finger proximal phalanx fracture performed on 10/15/19  Temo Dsouza was seen 1 week ago, at which time her pins were removed and she was placed on ABX due to concern for possible cellulitis  Temo Dsouza has been compliant with her ABX  She has also been working on digit ROM  Temo Dsouza states that she is 100 percent better  Physical Exam     /69   Pulse 73   Ht 5' 4" (1 626 m)   Wt 98 9 kg (218 lb)   BMI 37 42 kg/m²     Left ring finger:   Mild dorsal erythema, significantly improved since prior visit   No fluctuance or drainage  No erythema  No ecchymosis  Limited flexion and extension   5/5 Motor to the APB, FDI, FDP2, FDP5, EDC  Sensation intact to light touch in the median, radial, and ulnar nerve distribution  Data Review       Imaging:  No new imaging to review     Assessment and Plan       75 y o  female 1 month s/p closed reduction percutaneous pinning of the left ring finger performed on 10/15/19 due to a proximal phalanx fracture  Overall Temo Dsouza is doing well  She was advised to continue the use of her ABX until finished  She may remove her brace, while at home  She should use the brace while out of the house  A therapy script was provided today for ROM exercises  She was advised to also work on ROM while at home, out of the brace  Follow up in 3 weeks time         Follow Up: 3 weeks     To Do Next Visit: ROM check     PROCEDURES PERFORMED:  Procedures  No Procedures performed today       Scribe Attestation    I,:   Russell Woo am acting as a scribe while in the presence of the attending physician :        I,:   Chris Norman MD personally performed the services described in this documentation    as scribed in my presence :

## 2019-11-29 ENCOUNTER — EVALUATION (OUTPATIENT)
Dept: PHYSICAL THERAPY | Facility: MEDICAL CENTER | Age: 74
End: 2019-11-29
Payer: COMMERCIAL

## 2019-11-29 DIAGNOSIS — S62.615D CLOSED DISPLACED FRACTURE OF PROXIMAL PHALANX OF LEFT RING FINGER WITH ROUTINE HEALING, SUBSEQUENT ENCOUNTER: ICD-10-CM

## 2019-11-29 PROCEDURE — 97140 MANUAL THERAPY 1/> REGIONS: CPT | Performed by: PHYSICAL THERAPIST

## 2019-11-29 PROCEDURE — 97161 PT EVAL LOW COMPLEX 20 MIN: CPT | Performed by: PHYSICAL THERAPIST

## 2019-11-29 NOTE — PROGRESS NOTES
PT Evaluation     Today's date: 2019  Patient name: Sae Bui  : 1945  MRN: [id number]  Referring provider: Amarilis Hill MD  Dx:   Encounter Diagnosis     ICD-10-CM    1  Closed displaced fracture of proximal phalanx of left ring finger with routine healing, subsequent encounter [id number] Ambulatory referral to PT/OT hand therapy                  Assessment  Assessment details: Sae Bui is a 75 y o  female who came to outpatient PT on 19  Patient presents with complaints of ring and pinky finger s/p percutaneous pinning left ring finger proximal phalanx fracture performed on 10/15/19   The patient's greatest concerns are not being able to bend her fingers and make a full fist     No further referral appears necessary at this time based upon examination results  Sae Bui has the impairments listed below resulting in functional impairment and are having a negative impact on her quality of life  Patient is appropriate and would benefit from skilled PT intervention to have an optimal return to function and achieve patient specific goals  No Red Flags  All of the patient's questions were answered to their satisfaction and the patient agreed to the plan of care  Patient's greatest impairments are:  1 ) hypomobility of 4th and 5th digit DIP and PIP - to be addressed with self and therapist stretching and soft tissue mobilizations  2 ) decreased  strength - to be addressed with progressive resistance training and neuromuscular reeducation  Primary movement impairment diagnosis of hypomobility of 4th and 5th digit DIP and PIP resulting in pathoanatomical symptoms of the encounter diagnosis of Closed displaced fracture of proximal phalanx of left ring finger with routine healing, subsequent encounter  and limiting her ability to carry, wash dishes, pick things up  Etiologic factors include recent injury      Impairments: abnormal or restricted ROM and lacks appropriate home exercise program    Symptom irritability: lowUnderstanding of Dx/Px/POC: good   Prognosis: good    Goals  Patient will successfully transition to HEP  - met     Plan  Plan details: Due to financial reasons, patient is unable to return to therapy  She was given a HEP to perform at home and to come back with any issues or questions  Planned therapy interventions: patient education, therapeutic activities, therapeutic exercise, home exercise program, functional ROM exercises, flexibility and stretching  Treatment plan discussed with: patient        Subjective Evaluation    History of Present Illness  Mechanism of injury: Uche Pérez is a 75 y o  female who came to outpatient PT on 19  Patient presents with complaints of ring and pinky finger s/p percutaneous pinning left ring finger proximal phalanx fracture performed on 10/15/19   The patient's greatest concerns are not being able to bend her fingers and make a full fist     Denied any Red Flags  Patient finds difficult activities:  1 ) picking things up  2 ) washing dishes    No further referral appears necessary at this time based upon examination results  Patient's goals for therapy:   Patient's goals: be able to bend finger    Pain  Current pain ratin  At best pain ratin  At worst pain ratin          Objective     Observations     Left Wrist/Hand   Positive for edema  Right Wrist/Hand   Negative for edema       Neurological Testing     Sensation     Wrist/Hand   Left   Intact: light touch    Right   Intact: light touch    Active Range of Motion     Left Wrist   Wrist flexion: 65 degrees   Wrist extension: 60 degrees     Right Wrist   Wrist flexion: 70 degrees   Wrist extension: 65 degrees     Strength/Myotome Testing     Left Wrist/Hand   Wrist extension: 4+  Wrist flexion: 4+  Radial deviation: 4  Ulnar deviation: 4    Right Wrist/Hand   Wrist extension: 4+  Wrist flexion: 4+  Radial deviation: 4  Ulnar deviation: 4    Additional Strength Details  APB: 5/5 B/L  FDL: 4/5 B/L  FDP2: 5/5 B/L  FDP5: 5/5 B/L  EDC: 2-/5 B/L     Strength:  R 50 lbs  L 18 lbs    General Comments:      Wrist/Hand Comments  PIP 4th  Digit 90  DIP 4th Digit 30     PIP 5th Digit 95  DIP 5th Digit 45             Precautions: N/A      Manual                                                                                   Exercise Diary                                                                                                                                                                                                                                                                                      Modalities

## 2020-01-10 ENCOUNTER — OFFICE VISIT (OUTPATIENT)
Dept: OBGYN CLINIC | Facility: MEDICAL CENTER | Age: 75
End: 2020-01-10

## 2020-01-10 VITALS
DIASTOLIC BLOOD PRESSURE: 82 MMHG | WEIGHT: 220 LBS | HEART RATE: 71 BPM | HEIGHT: 64 IN | BODY MASS INDEX: 37.56 KG/M2 | SYSTOLIC BLOOD PRESSURE: 152 MMHG

## 2020-01-10 DIAGNOSIS — S62.615D CLOSED DISPLACED FRACTURE OF PROXIMAL PHALANX OF LEFT RING FINGER WITH ROUTINE HEALING, SUBSEQUENT ENCOUNTER: Primary | ICD-10-CM

## 2020-01-10 PROCEDURE — 99024 POSTOP FOLLOW-UP VISIT: CPT | Performed by: ORTHOPAEDIC SURGERY

## 2020-01-10 NOTE — PROGRESS NOTES
History of the Present Illness     Helen Gillespie is a 75 y o  female   Status post CR PP left ring finger proximal phalanx  Patient has no pain  He has significant stiffness  Is limited from some activity  Has not gone to therapy as she did not want to travel to the therapist site  Physical Exam     /82   Pulse 71   Ht 5' 4" (1 626 m)   Wt 99 8 kg (220 lb)   BMI 37 76 kg/m²       Examination of the left upper extremity reveals well-healed wound on the dorsum of the hand  She has limited motion with a DPC of about 4 cm  Significant tightness at the PIP with  15-80 degrees of motion  MCP is about 0-80 degrees of motion  DIP is stiff  Sensation in the radial ulnar aspect of the digit is intact  Data Review       Imaging:    None today    Assessment and Plan        50-year-old female who has clinically healed her left ring finger proximal phalanx fracture status post CR PP, but has significant stiffness  I discussed with her that going to an outpatient therapy side is the most efficacious at keeping her on top of her therapy exercises  Discussed that she should be continuing with this at home though in addition to that multiple times per day  Patient does not want to attend therapy sessions and is therefore putting herself at risk for persistent significant stiffness  She understands this and I had spent 10 min with her going through therapy exercises that she can do on her own at home and counseling her about the importance of her things exercises multiple times a day  She will follow up on an as-needed basis if her symptoms fail to improve or worsen  Follow Up:   P r n      To Do Next Visit:     PROCEDURES PERFORMED:  Procedures  No Procedures performed today

## 2020-01-19 DIAGNOSIS — I10 HYPERTENSION, UNSPECIFIED TYPE: ICD-10-CM

## 2020-01-19 RX ORDER — ATENOLOL 25 MG/1
TABLET ORAL
Qty: 90 TABLET | Refills: 0 | Status: SHIPPED | OUTPATIENT
Start: 2020-01-19 | End: 2020-04-21 | Stop reason: SDUPTHER

## 2020-02-29 DIAGNOSIS — F32.9 REACTIVE DEPRESSION: ICD-10-CM

## 2020-02-29 RX ORDER — CITALOPRAM 20 MG/1
TABLET ORAL
Qty: 90 TABLET | Refills: 3 | Status: SHIPPED | OUTPATIENT
Start: 2020-02-29 | End: 2021-03-01

## 2020-03-02 DIAGNOSIS — E11.9 TYPE 2 DIABETES MELLITUS WITHOUT COMPLICATION, WITHOUT LONG-TERM CURRENT USE OF INSULIN (HCC): ICD-10-CM

## 2020-03-02 RX ORDER — SITAGLIPTIN 100 MG/1
TABLET, FILM COATED ORAL
Qty: 90 TABLET | Refills: 1 | Status: SHIPPED | OUTPATIENT
Start: 2020-03-02 | End: 2020-08-30

## 2020-04-13 DIAGNOSIS — I10 HYPERTENSION, UNSPECIFIED TYPE: ICD-10-CM

## 2020-04-13 RX ORDER — ATENOLOL 25 MG/1
TABLET ORAL
Qty: 90 TABLET | Refills: 0 | OUTPATIENT
Start: 2020-04-13

## 2020-04-16 DIAGNOSIS — I10 BENIGN ESSENTIAL HYPERTENSION: ICD-10-CM

## 2020-04-16 RX ORDER — AMLODIPINE BESYLATE 5 MG/1
TABLET ORAL
Qty: 90 TABLET | Refills: 0 | Status: SHIPPED | OUTPATIENT
Start: 2020-04-16 | End: 2020-07-11

## 2020-04-21 ENCOUNTER — TELEMEDICINE (OUTPATIENT)
Dept: FAMILY MEDICINE CLINIC | Facility: CLINIC | Age: 75
End: 2020-04-21
Payer: COMMERCIAL

## 2020-04-21 VITALS — WEIGHT: 220 LBS | HEIGHT: 64 IN | BODY MASS INDEX: 37.56 KG/M2

## 2020-04-21 DIAGNOSIS — I10 ESSENTIAL HYPERTENSION: ICD-10-CM

## 2020-04-21 DIAGNOSIS — E78.2 MIXED HYPERLIPIDEMIA: ICD-10-CM

## 2020-04-21 DIAGNOSIS — G47.62 NOCTURNAL LEG CRAMPS: ICD-10-CM

## 2020-04-21 DIAGNOSIS — F41.8 DEPRESSION WITH ANXIETY: ICD-10-CM

## 2020-04-21 DIAGNOSIS — I10 HYPERTENSION, UNSPECIFIED TYPE: ICD-10-CM

## 2020-04-21 DIAGNOSIS — E11.9 TYPE 2 DIABETES MELLITUS WITHOUT COMPLICATION, WITHOUT LONG-TERM CURRENT USE OF INSULIN (HCC): Primary | ICD-10-CM

## 2020-04-21 PROBLEM — K52.9 GASTROENTERITIS: Status: RESOLVED | Noted: 2018-10-25 | Resolved: 2020-04-21

## 2020-04-21 PROCEDURE — 99442 PR PHYS/QHP TELEPHONE EVALUATION 11-20 MIN: CPT | Performed by: FAMILY MEDICINE

## 2020-04-21 RX ORDER — ATENOLOL 25 MG/1
25 TABLET ORAL
Qty: 90 TABLET | Refills: 3 | Status: SHIPPED | OUTPATIENT
Start: 2020-04-21 | End: 2021-04-21

## 2020-04-22 ENCOUNTER — APPOINTMENT (OUTPATIENT)
Dept: LAB | Facility: CLINIC | Age: 75
End: 2020-04-22
Payer: COMMERCIAL

## 2020-04-22 DIAGNOSIS — G47.62 NOCTURNAL LEG CRAMPS: ICD-10-CM

## 2020-04-22 DIAGNOSIS — I10 ESSENTIAL HYPERTENSION: ICD-10-CM

## 2020-04-22 DIAGNOSIS — I10 HYPERTENSION, UNSPECIFIED TYPE: ICD-10-CM

## 2020-04-22 DIAGNOSIS — F41.8 DEPRESSION WITH ANXIETY: ICD-10-CM

## 2020-04-22 DIAGNOSIS — E11.9 TYPE 2 DIABETES MELLITUS WITHOUT COMPLICATION, WITHOUT LONG-TERM CURRENT USE OF INSULIN (HCC): ICD-10-CM

## 2020-04-22 DIAGNOSIS — E78.2 MIXED HYPERLIPIDEMIA: ICD-10-CM

## 2020-04-22 LAB
ALBUMIN SERPL BCP-MCNC: 3.8 G/DL (ref 3.5–5)
ALP SERPL-CCNC: 62 U/L (ref 46–116)
ALT SERPL W P-5'-P-CCNC: 19 U/L (ref 12–78)
ANION GAP SERPL CALCULATED.3IONS-SCNC: 6 MMOL/L (ref 4–13)
AST SERPL W P-5'-P-CCNC: 14 U/L (ref 5–45)
BASOPHILS # BLD AUTO: 0.06 THOUSANDS/ΜL (ref 0–0.1)
BASOPHILS NFR BLD AUTO: 1 % (ref 0–1)
BILIRUB SERPL-MCNC: 0.34 MG/DL (ref 0.2–1)
BUN SERPL-MCNC: 24 MG/DL (ref 5–25)
CALCIUM SERPL-MCNC: 9.5 MG/DL (ref 8.3–10.1)
CHLORIDE SERPL-SCNC: 103 MMOL/L (ref 100–108)
CHOLEST SERPL-MCNC: 174 MG/DL (ref 50–200)
CO2 SERPL-SCNC: 31 MMOL/L (ref 21–32)
CREAT SERPL-MCNC: 0.75 MG/DL (ref 0.6–1.3)
EOSINOPHIL # BLD AUTO: 0.31 THOUSAND/ΜL (ref 0–0.61)
EOSINOPHIL NFR BLD AUTO: 4 % (ref 0–6)
ERYTHROCYTE [DISTWIDTH] IN BLOOD BY AUTOMATED COUNT: 14.8 % (ref 11.6–15.1)
EST. AVERAGE GLUCOSE BLD GHB EST-MCNC: 134 MG/DL
GFR SERPL CREATININE-BSD FRML MDRD: 78 ML/MIN/1.73SQ M
GLUCOSE P FAST SERPL-MCNC: 91 MG/DL (ref 65–99)
HBA1C MFR BLD: 6.3 %
HCT VFR BLD AUTO: 41.1 % (ref 34.8–46.1)
HDLC SERPL-MCNC: 74 MG/DL
HGB BLD-MCNC: 12.8 G/DL (ref 11.5–15.4)
IMM GRANULOCYTES # BLD AUTO: 0.01 THOUSAND/UL (ref 0–0.2)
IMM GRANULOCYTES NFR BLD AUTO: 0 % (ref 0–2)
LDLC SERPL CALC-MCNC: 66 MG/DL (ref 0–100)
LYMPHOCYTES # BLD AUTO: 2.06 THOUSANDS/ΜL (ref 0.6–4.47)
LYMPHOCYTES NFR BLD AUTO: 29 % (ref 14–44)
MCH RBC QN AUTO: 30 PG (ref 26.8–34.3)
MCHC RBC AUTO-ENTMCNC: 31.1 G/DL (ref 31.4–37.4)
MCV RBC AUTO: 96 FL (ref 82–98)
MONOCYTES # BLD AUTO: 0.48 THOUSAND/ΜL (ref 0.17–1.22)
MONOCYTES NFR BLD AUTO: 7 % (ref 4–12)
NEUTROPHILS # BLD AUTO: 4.19 THOUSANDS/ΜL (ref 1.85–7.62)
NEUTS SEG NFR BLD AUTO: 59 % (ref 43–75)
NRBC BLD AUTO-RTO: 0 /100 WBCS
PLATELET # BLD AUTO: 348 THOUSANDS/UL (ref 149–390)
PMV BLD AUTO: 10.6 FL (ref 8.9–12.7)
POTASSIUM SERPL-SCNC: 4.4 MMOL/L (ref 3.5–5.3)
PROT SERPL-MCNC: 7.7 G/DL (ref 6.4–8.2)
RBC # BLD AUTO: 4.27 MILLION/UL (ref 3.81–5.12)
SODIUM SERPL-SCNC: 140 MMOL/L (ref 136–145)
TRIGL SERPL-MCNC: 168 MG/DL
TSH SERPL DL<=0.05 MIU/L-ACNC: 3.76 UIU/ML (ref 0.36–3.74)
WBC # BLD AUTO: 7.11 THOUSAND/UL (ref 4.31–10.16)

## 2020-04-22 PROCEDURE — 85025 COMPLETE CBC W/AUTO DIFF WBC: CPT

## 2020-04-22 PROCEDURE — 80053 COMPREHEN METABOLIC PANEL: CPT

## 2020-04-22 PROCEDURE — 36415 COLL VENOUS BLD VENIPUNCTURE: CPT

## 2020-04-22 PROCEDURE — 83036 HEMOGLOBIN GLYCOSYLATED A1C: CPT

## 2020-04-22 PROCEDURE — 80061 LIPID PANEL: CPT

## 2020-04-22 PROCEDURE — 84443 ASSAY THYROID STIM HORMONE: CPT

## 2020-04-22 PROCEDURE — 3044F HG A1C LEVEL LT 7.0%: CPT | Performed by: FAMILY MEDICINE

## 2020-04-30 ENCOUNTER — OFFICE VISIT (OUTPATIENT)
Dept: FAMILY MEDICINE CLINIC | Facility: CLINIC | Age: 75
End: 2020-04-30
Payer: COMMERCIAL

## 2020-04-30 VITALS
WEIGHT: 223 LBS | HEART RATE: 62 BPM | BODY MASS INDEX: 38.07 KG/M2 | HEIGHT: 64 IN | SYSTOLIC BLOOD PRESSURE: 118 MMHG | DIASTOLIC BLOOD PRESSURE: 70 MMHG

## 2020-04-30 DIAGNOSIS — I10 ESSENTIAL HYPERTENSION: Primary | ICD-10-CM

## 2020-04-30 DIAGNOSIS — F41.8 DEPRESSION WITH ANXIETY: ICD-10-CM

## 2020-04-30 DIAGNOSIS — E78.2 MIXED HYPERLIPIDEMIA: ICD-10-CM

## 2020-04-30 DIAGNOSIS — E11.9 TYPE 2 DIABETES MELLITUS WITHOUT COMPLICATION, WITHOUT LONG-TERM CURRENT USE OF INSULIN (HCC): ICD-10-CM

## 2020-04-30 DIAGNOSIS — Z82.49 FAMILY HISTORY OF AORTIC ANEURYSM: ICD-10-CM

## 2020-04-30 PROBLEM — M79.605 PAIN IN LEFT LEG: Status: RESOLVED | Noted: 2018-12-11 | Resolved: 2020-04-30

## 2020-04-30 PROBLEM — R05.9 COUGH: Status: RESOLVED | Noted: 2018-02-22 | Resolved: 2020-04-30

## 2020-04-30 PROBLEM — S62.615A CLOSED DISPLACED FRACTURE OF PROXIMAL PHALANX OF LEFT RING FINGER: Status: RESOLVED | Noted: 2019-10-11 | Resolved: 2020-04-30

## 2020-04-30 PROBLEM — F32.9 REACTIVE DEPRESSION: Status: RESOLVED | Noted: 2018-02-01 | Resolved: 2020-04-30

## 2020-04-30 PROBLEM — M25.569 KNEE PAIN: Status: RESOLVED | Noted: 2018-02-01 | Resolved: 2020-04-30

## 2020-04-30 PROBLEM — J01.90 ACUTE NON-RECURRENT SINUSITIS: Status: RESOLVED | Noted: 2018-02-22 | Resolved: 2020-04-30

## 2020-04-30 PROCEDURE — 3008F BODY MASS INDEX DOCD: CPT | Performed by: FAMILY MEDICINE

## 2020-04-30 PROCEDURE — 2022F DILAT RTA XM EVC RTNOPTHY: CPT | Performed by: FAMILY MEDICINE

## 2020-04-30 PROCEDURE — 3044F HG A1C LEVEL LT 7.0%: CPT | Performed by: FAMILY MEDICINE

## 2020-04-30 PROCEDURE — 1036F TOBACCO NON-USER: CPT | Performed by: FAMILY MEDICINE

## 2020-04-30 PROCEDURE — G0439 PPPS, SUBSEQ VISIT: HCPCS | Performed by: FAMILY MEDICINE

## 2020-04-30 PROCEDURE — 3078F DIAST BP <80 MM HG: CPT | Performed by: FAMILY MEDICINE

## 2020-04-30 PROCEDURE — 1170F FXNL STATUS ASSESSED: CPT | Performed by: FAMILY MEDICINE

## 2020-04-30 PROCEDURE — 99214 OFFICE O/P EST MOD 30 MIN: CPT | Performed by: FAMILY MEDICINE

## 2020-04-30 PROCEDURE — 2025F 7 FLD RTA PHOTO W/O RTNOPTHY: CPT | Performed by: FAMILY MEDICINE

## 2020-04-30 PROCEDURE — 4040F PNEUMOC VAC/ADMIN/RCVD: CPT | Performed by: FAMILY MEDICINE

## 2020-04-30 PROCEDURE — 3074F SYST BP LT 130 MM HG: CPT | Performed by: FAMILY MEDICINE

## 2020-04-30 PROCEDURE — 1125F AMNT PAIN NOTED PAIN PRSNT: CPT | Performed by: FAMILY MEDICINE

## 2020-04-30 PROCEDURE — 1160F RVW MEDS BY RX/DR IN RCRD: CPT | Performed by: FAMILY MEDICINE

## 2020-07-11 DIAGNOSIS — I10 BENIGN ESSENTIAL HYPERTENSION: ICD-10-CM

## 2020-07-11 RX ORDER — AMLODIPINE BESYLATE 5 MG/1
TABLET ORAL
Qty: 90 TABLET | Refills: 0 | Status: SHIPPED | OUTPATIENT
Start: 2020-07-11 | End: 2020-10-08

## 2020-08-30 DIAGNOSIS — E11.9 TYPE 2 DIABETES MELLITUS WITHOUT COMPLICATION, WITHOUT LONG-TERM CURRENT USE OF INSULIN (HCC): ICD-10-CM

## 2020-08-30 DIAGNOSIS — E78.2 MIXED HYPERLIPIDEMIA: ICD-10-CM

## 2020-08-30 RX ORDER — SITAGLIPTIN 100 MG/1
TABLET, FILM COATED ORAL
Qty: 90 TABLET | Refills: 1 | Status: SHIPPED | OUTPATIENT
Start: 2020-08-30 | End: 2021-03-01

## 2020-08-31 RX ORDER — SIMVASTATIN 40 MG
TABLET ORAL
Qty: 90 TABLET | Refills: 3 | Status: SHIPPED | OUTPATIENT
Start: 2020-08-31 | End: 2021-08-17 | Stop reason: SDUPTHER

## 2020-10-08 DIAGNOSIS — I10 BENIGN ESSENTIAL HYPERTENSION: ICD-10-CM

## 2020-10-08 RX ORDER — AMLODIPINE BESYLATE 5 MG/1
TABLET ORAL
Qty: 90 TABLET | Refills: 0 | Status: SHIPPED | OUTPATIENT
Start: 2020-10-08 | End: 2021-01-15

## 2020-11-02 ENCOUNTER — LAB (OUTPATIENT)
Dept: LAB | Facility: CLINIC | Age: 75
End: 2020-11-02
Payer: COMMERCIAL

## 2020-11-02 DIAGNOSIS — I10 ESSENTIAL HYPERTENSION: ICD-10-CM

## 2020-11-02 DIAGNOSIS — E11.9 TYPE 2 DIABETES MELLITUS WITHOUT COMPLICATION, WITHOUT LONG-TERM CURRENT USE OF INSULIN (HCC): ICD-10-CM

## 2020-11-02 DIAGNOSIS — E78.2 MIXED HYPERLIPIDEMIA: ICD-10-CM

## 2020-11-02 DIAGNOSIS — F41.8 DEPRESSION WITH ANXIETY: ICD-10-CM

## 2020-11-02 LAB
ALBUMIN SERPL BCP-MCNC: 3.9 G/DL (ref 3.5–5)
ALP SERPL-CCNC: 50 U/L (ref 46–116)
ALT SERPL W P-5'-P-CCNC: 24 U/L (ref 12–78)
ANION GAP SERPL CALCULATED.3IONS-SCNC: 5 MMOL/L (ref 4–13)
AST SERPL W P-5'-P-CCNC: 18 U/L (ref 5–45)
BILIRUB SERPL-MCNC: 0.29 MG/DL (ref 0.2–1)
BUN SERPL-MCNC: 24 MG/DL (ref 5–25)
CALCIUM SERPL-MCNC: 9.1 MG/DL (ref 8.3–10.1)
CHLORIDE SERPL-SCNC: 104 MMOL/L (ref 100–108)
CO2 SERPL-SCNC: 32 MMOL/L (ref 21–32)
CREAT SERPL-MCNC: 0.7 MG/DL (ref 0.6–1.3)
CREAT UR-MCNC: 237 MG/DL
EST. AVERAGE GLUCOSE BLD GHB EST-MCNC: 134 MG/DL
GFR SERPL CREATININE-BSD FRML MDRD: 84 ML/MIN/1.73SQ M
GLUCOSE P FAST SERPL-MCNC: 92 MG/DL (ref 65–99)
HBA1C MFR BLD: 6.3 %
MICROALBUMIN UR-MCNC: 35.5 MG/L (ref 0–20)
MICROALBUMIN/CREAT 24H UR: 15 MG/G CREATININE (ref 0–30)
POTASSIUM SERPL-SCNC: 4.4 MMOL/L (ref 3.5–5.3)
PROT SERPL-MCNC: 7.5 G/DL (ref 6.4–8.2)
SODIUM SERPL-SCNC: 141 MMOL/L (ref 136–145)
TRIGL SERPL-MCNC: 106 MG/DL
TSH SERPL DL<=0.05 MIU/L-ACNC: 2.02 UIU/ML (ref 0.36–3.74)

## 2020-11-02 PROCEDURE — 3061F NEG MICROALBUMINURIA REV: CPT | Performed by: PHYSICIAN ASSISTANT

## 2020-11-02 PROCEDURE — 3044F HG A1C LEVEL LT 7.0%: CPT | Performed by: PHYSICIAN ASSISTANT

## 2020-11-02 PROCEDURE — 82043 UR ALBUMIN QUANTITATIVE: CPT

## 2020-11-02 PROCEDURE — 36415 COLL VENOUS BLD VENIPUNCTURE: CPT

## 2020-11-02 PROCEDURE — 83036 HEMOGLOBIN GLYCOSYLATED A1C: CPT

## 2020-11-02 PROCEDURE — 80053 COMPREHEN METABOLIC PANEL: CPT

## 2020-11-02 PROCEDURE — 82570 ASSAY OF URINE CREATININE: CPT

## 2020-11-02 PROCEDURE — 84478 ASSAY OF TRIGLYCERIDES: CPT

## 2020-11-02 PROCEDURE — 84443 ASSAY THYROID STIM HORMONE: CPT

## 2020-11-09 ENCOUNTER — OFFICE VISIT (OUTPATIENT)
Dept: FAMILY MEDICINE CLINIC | Facility: CLINIC | Age: 75
End: 2020-11-09
Payer: COMMERCIAL

## 2020-11-09 VITALS
HEART RATE: 68 BPM | WEIGHT: 220 LBS | TEMPERATURE: 98.4 F | HEIGHT: 64 IN | SYSTOLIC BLOOD PRESSURE: 130 MMHG | BODY MASS INDEX: 37.56 KG/M2 | DIASTOLIC BLOOD PRESSURE: 74 MMHG

## 2020-11-09 DIAGNOSIS — R32 URINARY INCONTINENCE, UNSPECIFIED TYPE: ICD-10-CM

## 2020-11-09 DIAGNOSIS — I10 ESSENTIAL HYPERTENSION: ICD-10-CM

## 2020-11-09 DIAGNOSIS — E11.9 TYPE 2 DIABETES MELLITUS WITHOUT COMPLICATION, WITHOUT LONG-TERM CURRENT USE OF INSULIN (HCC): Primary | ICD-10-CM

## 2020-11-09 DIAGNOSIS — E78.2 MIXED HYPERLIPIDEMIA: ICD-10-CM

## 2020-11-09 DIAGNOSIS — Z23 NEED FOR INFLUENZA VACCINATION: ICD-10-CM

## 2020-11-09 PROCEDURE — 99214 OFFICE O/P EST MOD 30 MIN: CPT | Performed by: PHYSICIAN ASSISTANT

## 2020-11-09 PROCEDURE — 90662 IIV NO PRSV INCREASED AG IM: CPT | Performed by: PHYSICIAN ASSISTANT

## 2020-11-09 PROCEDURE — 1036F TOBACCO NON-USER: CPT | Performed by: PHYSICIAN ASSISTANT

## 2020-11-09 PROCEDURE — G0008 ADMIN INFLUENZA VIRUS VAC: HCPCS | Performed by: PHYSICIAN ASSISTANT

## 2020-11-09 PROCEDURE — 3075F SYST BP GE 130 - 139MM HG: CPT | Performed by: PHYSICIAN ASSISTANT

## 2020-11-09 PROCEDURE — 2025F 7 FLD RTA PHOTO W/O RTNOPTHY: CPT | Performed by: PHYSICIAN ASSISTANT

## 2020-11-09 PROCEDURE — 3078F DIAST BP <80 MM HG: CPT | Performed by: PHYSICIAN ASSISTANT

## 2020-11-27 ENCOUNTER — LAB (OUTPATIENT)
Dept: LAB | Facility: CLINIC | Age: 75
End: 2020-11-27
Payer: COMMERCIAL

## 2020-11-27 LAB
BACTERIA UR QL AUTO: ABNORMAL /HPF
BILIRUB UR QL STRIP: NEGATIVE
CLARITY UR: ABNORMAL
COLOR UR: YELLOW
GLUCOSE UR STRIP-MCNC: NEGATIVE MG/DL
HGB UR QL STRIP.AUTO: ABNORMAL
KETONES UR STRIP-MCNC: NEGATIVE MG/DL
LEUKOCYTE ESTERASE UR QL STRIP: ABNORMAL
MUCOUS THREADS UR QL AUTO: ABNORMAL
NITRITE UR QL STRIP: POSITIVE
NON-SQ EPI CELLS URNS QL MICRO: ABNORMAL /HPF
PH UR STRIP.AUTO: 5.5 [PH]
PROT UR STRIP-MCNC: ABNORMAL MG/DL
RBC #/AREA URNS AUTO: ABNORMAL /HPF
SP GR UR STRIP.AUTO: 1.03 (ref 1–1.03)
UROBILINOGEN UR QL STRIP.AUTO: 0.2 E.U./DL
WBC #/AREA URNS AUTO: ABNORMAL /HPF

## 2020-11-27 PROCEDURE — 87086 URINE CULTURE/COLONY COUNT: CPT | Performed by: PHYSICIAN ASSISTANT

## 2020-11-27 PROCEDURE — 87186 SC STD MICRODIL/AGAR DIL: CPT | Performed by: PHYSICIAN ASSISTANT

## 2020-11-27 PROCEDURE — 81001 URINALYSIS AUTO W/SCOPE: CPT | Performed by: PHYSICIAN ASSISTANT

## 2020-11-27 PROCEDURE — 87077 CULTURE AEROBIC IDENTIFY: CPT | Performed by: PHYSICIAN ASSISTANT

## 2020-11-27 PROCEDURE — 87147 CULTURE TYPE IMMUNOLOGIC: CPT | Performed by: PHYSICIAN ASSISTANT

## 2020-11-29 LAB
BACTERIA UR CULT: ABNORMAL
BACTERIA UR CULT: ABNORMAL

## 2020-11-30 DIAGNOSIS — N39.0 RECURRENT UTI: Primary | ICD-10-CM

## 2020-11-30 RX ORDER — DOXYCYCLINE HYCLATE 100 MG/1
CAPSULE ORAL
Qty: 7 CAPSULE | Refills: 0 | Status: SHIPPED | OUTPATIENT
Start: 2020-11-30 | End: 2020-12-06

## 2021-01-15 DIAGNOSIS — I10 BENIGN ESSENTIAL HYPERTENSION: ICD-10-CM

## 2021-01-15 RX ORDER — AMLODIPINE BESYLATE 5 MG/1
TABLET ORAL
Qty: 90 TABLET | Refills: 0 | Status: SHIPPED | OUTPATIENT
Start: 2021-01-15 | End: 2021-04-12

## 2021-03-01 DIAGNOSIS — F32.9 REACTIVE DEPRESSION: ICD-10-CM

## 2021-03-01 DIAGNOSIS — E11.9 TYPE 2 DIABETES MELLITUS WITHOUT COMPLICATION, WITHOUT LONG-TERM CURRENT USE OF INSULIN (HCC): ICD-10-CM

## 2021-03-01 RX ORDER — SITAGLIPTIN 100 MG/1
TABLET, FILM COATED ORAL
Qty: 90 TABLET | Refills: 1 | Status: SHIPPED | OUTPATIENT
Start: 2021-03-01 | End: 2021-08-17 | Stop reason: SDUPTHER

## 2021-03-01 RX ORDER — CITALOPRAM 20 MG/1
TABLET ORAL
Qty: 90 TABLET | Refills: 3 | Status: SHIPPED | OUTPATIENT
Start: 2021-03-01 | End: 2021-08-17 | Stop reason: SDUPTHER

## 2021-03-06 DIAGNOSIS — E11.9 TYPE 2 DIABETES MELLITUS WITHOUT COMPLICATION, WITHOUT LONG-TERM CURRENT USE OF INSULIN (HCC): ICD-10-CM

## 2021-04-11 DIAGNOSIS — I10 BENIGN ESSENTIAL HYPERTENSION: ICD-10-CM

## 2021-04-12 RX ORDER — AMLODIPINE BESYLATE 5 MG/1
TABLET ORAL
Qty: 90 TABLET | Refills: 0 | Status: SHIPPED | OUTPATIENT
Start: 2021-04-12 | End: 2021-07-08

## 2021-04-16 ENCOUNTER — OFFICE VISIT (OUTPATIENT)
Dept: URGENT CARE | Facility: CLINIC | Age: 76
End: 2021-04-16
Payer: COMMERCIAL

## 2021-04-16 VITALS
DIASTOLIC BLOOD PRESSURE: 84 MMHG | OXYGEN SATURATION: 98 % | SYSTOLIC BLOOD PRESSURE: 136 MMHG | TEMPERATURE: 96.5 F | RESPIRATION RATE: 20 BRPM | HEART RATE: 81 BPM

## 2021-04-16 DIAGNOSIS — B34.9 ACUTE VIRAL SYNDROME: ICD-10-CM

## 2021-04-16 DIAGNOSIS — R11.0 NAUSEA: ICD-10-CM

## 2021-04-16 DIAGNOSIS — J06.9 ACUTE URI: Primary | ICD-10-CM

## 2021-04-16 PROCEDURE — U0005 INFEC AGEN DETEC AMPLI PROBE: HCPCS | Performed by: PHYSICIAN ASSISTANT

## 2021-04-16 PROCEDURE — 99213 OFFICE O/P EST LOW 20 MIN: CPT | Performed by: PHYSICIAN ASSISTANT

## 2021-04-16 PROCEDURE — U0003 INFECTIOUS AGENT DETECTION BY NUCLEIC ACID (DNA OR RNA); SEVERE ACUTE RESPIRATORY SYNDROME CORONAVIRUS 2 (SARS-COV-2) (CORONAVIRUS DISEASE [COVID-19]), AMPLIFIED PROBE TECHNIQUE, MAKING USE OF HIGH THROUGHPUT TECHNOLOGIES AS DESCRIBED BY CMS-2020-01-R: HCPCS | Performed by: PHYSICIAN ASSISTANT

## 2021-04-16 RX ORDER — ONDANSETRON 4 MG/1
4 TABLET, ORALLY DISINTEGRATING ORAL EVERY 6 HOURS PRN
Qty: 20 TABLET | Refills: 0 | Status: SHIPPED | OUTPATIENT
Start: 2021-04-16 | End: 2022-05-13 | Stop reason: ALTCHOICE

## 2021-04-16 NOTE — PATIENT INSTRUCTIONS
COVID test initiated  Results take approximately 2-4 days to return  You may access your results through a [facility] chart account  Your daughter may be able to help you with that on her smart phone or on a computer if 1 is available  If you are unable to access your test results, please call our office on Monday morning to inquire about your test results  Home isolation  Good hand washing and cleaning around sink basins and toilets  Recommend your daughter also be diligent with her hand washing  Deep breathing exercises 1-2 times an hour to expand your lungs  May take some Robitussin expectorant for your cough  We do not want to completely suppress your cough as that is 1 of the body mechanisms to keep your airway clear  Anti nausea medicine sent in to your pharmacy  Recommend clear liquids for the next 12 hours and then may advance to easily digested foods after that with advancing diet as tolerated  If you would start to have difficulty breathing, increased weakness, chest pain please proceed to emergency room for further evaluation  No work  Work note given

## 2021-04-16 NOTE — LETTER
April 16, 2021     Patient: Vera Dang   YOB: 1945   Date of Visit: 4/16/2021       Patient seen today for acute medical ailment  COVID testing initiated  Off work / out of school until results have returned  If results are negative and patient has been without fever for 24 hours without having to take anti fever medication, patient may return to work / school  If results are positive, patient needs to remain off work / school and follow-up with primary care provider for further instructions      Sincerely,        Kaya Hayes PA-C    CC: No Recipients

## 2021-04-16 NOTE — PROGRESS NOTES
[address] [facility]    NAME: Ping Guillen is a 67 y o  female  : 1945    MRN: [id number]  DATE: 2021  TIME: 12:43 PM    Assessment and Plan   Acute URI [J06 9]  1  Acute URI  Novel Coronavirus (Covid-19),PCR [facility] - Office Collection   2  Acute viral syndrome     3  Nausea  ondansetron (ZOFRAN-ODT) 4 mg disintegrating tablet     Note given for work  Patient Instructions   Patient Instructions    COVID test initiated  Results take approximately 2-4 days to return  You may access your results through a [employer] chart account  Your daughter may be able to help you with that on her smart phone or on a computer if 1 is available  If you are unable to access your test results, please call our office on Monday morning to inquire about your test results  Home isolation  Good hand washing and cleaning around sink basins and toilets  Recommend your daughter also be diligent with her hand washing  Deep breathing exercises 1-2 times an hour to expand your lungs  May take some Robitussin expectorant for your cough  We do not want to completely suppress your cough as that is 1 of the body mechanisms to keep your airway clear  Anti nausea medicine sent in to your pharmacy  Recommend clear liquids for the next 12 hours and then may advance to easily digested foods after that with advancing diet as tolerated  If you would start to have difficulty breathing, increased weakness, chest pain please proceed to emergency room for further evaluation  No work  Work note given  Chief Complaint     Chief Complaint   Patient presents with    Cold Like Symptoms     Patient c/o chills, nausea, loose stools, fatigue and a cough for 1 day       History of Present Illness   Ping Guillen presents to the clinic c/o    69-year-old female comes in with chills, fatigue, nausea, diarrhea, cough that started yesterday  She 1st noticed cough  Denies chest pain or shortness of breath    She has also had runny nose and postnasal drip  She is unaware of any known exposures of COVID  She says that she only goes to the grocery store  Her daughter that she lives worth the does work outside the home but she has been healthy  Later on in the visit when the patient asks for a note for work, she informs us that she does work at [employer] 15 hours per week  Diarrhea is soft and little bits at a time  No blood  She has felt nauseated but no vomiting  Her appetite is diminished  She says she can not get warm  She has not had COVID vaccine  Review of Systems   Review of Systems   Constitutional: Positive for activity change, appetite change, chills and fatigue  Negative for diaphoresis and fever  HENT: Positive for congestion, postnasal drip and rhinorrhea  Negative for sore throat  Respiratory: Positive for cough  Negative for chest tightness, shortness of breath, wheezing and stridor  Cardiovascular: Negative  Gastrointestinal: Positive for diarrhea and nausea  Negative for abdominal pain, anal bleeding, blood in stool, constipation and vomiting  Musculoskeletal:        No new body aches and pains  Neurological: Negative for dizziness and headaches         Current Medications     Long-Term Medications   Medication Sig Dispense Refill    amLODIPine (NORVASC) 5 mg tablet TAKE 1 TABLET BY MOUTH EVERY DAY 90 tablet 0    atenolol (TENORMIN) 25 mg tablet Take 1 tablet (25 mg total) by mouth daily at bedtime 90 tablet 3    Blood Glucose Monitoring Suppl (FREESTYLE FREEDOM LITE) w/Device KIT by Does not apply route      citalopram (CeleXA) 20 mg tablet TAKE 1 TABLET BY MOUTH EVERY DAY 90 tablet 3    cyclobenzaprine (FLEXERIL) 10 mg tablet Take 1 tablet (10 mg total) by mouth daily at bedtime 30 tablet 0    furosemide (LASIX) 40 mg tablet Take by mouth      Januvia 100 MG tablet TAKE 1 TABLET BY MOUTH EVERY DAY 90 tablet 1    metFORMIN (GLUCOPHAGE) 1000 MG tablet TAKE 1 TABLET BY MOUTH TWICE A  tablet 1    simvastatin (ZOCOR) 40 mg tablet TAKE 1 TABLET BY MOUTH EVERY DAY 90 tablet 3    ondansetron (ZOFRAN-ODT) 4 mg disintegrating tablet Take 1 tablet (4 mg total) by mouth every 6 (six) hours as needed for nausea or vomiting 20 tablet 0       Current Allergies     Allergies as of 04/16/2021 - Reviewed 04/16/2021   Allergen Reaction Noted    Penicillins Anaphylaxis 05/24/2012          The following portions of the patient's history were reviewed and updated as appropriate: allergies, current medications, past family history, past medical history, past social history, past surgical history and problem list   Past Medical History:   Diagnosis Date    Arrhythmia     Diabetes mellitus [facility] [address] )     Hyperlipidemia     Hypertension     Type 2 diabetes mellitus with hyperglycemia ([facility] [address] )     last assessed: 9/19/2014     Past Surgical History:   Procedure Laterality Date    APPENDECTOMY      BREAST SURGERY      Reduction Procedure bilateral    KNEE SURGERY      LITHOTRIPSY      renal    MA OPEN TX PHALANGEAL SHAFT FRACTURE PROX/MIDDLE EA Left 10/15/2019    Procedure: Closed reduction percutaneous pinning;  Surgeon: Shady López MD;  Location: [facility] MAIN OR;  Service: Orthopedics    TONSILLECTOMY AND ADENOIDECTOMY       Family History   Problem Relation Age of Onset    Diabetes Mother     Hypertension Mother     Colon cancer Father     Stroke Father        Objective   /84   Pulse 81   Temp (!) 96 5 °F (35 8 °C) (Tympanic)   Resp 20   SpO2 98%   No LMP recorded  Patient has had a hysterectomy  Physical Exam     Physical Exam  Vitals signs and nursing note reviewed  Constitutional:       General: She is not in acute distress  Appearance: Normal appearance  She is obese  She is ill-appearing  She is not toxic-appearing or diaphoretic  Comments: Appears mildly ill but in no acute distress   HENT:      Head: Normocephalic and atraumatic        Nose: Rhinorrhea present  No congestion  Mouth/Throat:      Mouth: Mucous membranes are moist       Pharynx: No oropharyngeal exudate or posterior oropharyngeal erythema  Eyes:      General: No scleral icterus  Right eye: No discharge  Left eye: No discharge  Extraocular Movements: Extraocular movements intact  Conjunctiva/sclera: Conjunctivae normal       Pupils: Pupils are equal, round, and reactive to light  Neck:      Musculoskeletal: Normal range of motion and neck supple  No neck rigidity or muscular tenderness  Cardiovascular:      Rate and Rhythm: Normal rate and regular rhythm  Heart sounds: Normal heart sounds  No murmur  No friction rub  No gallop  Pulmonary:      Effort: Pulmonary effort is normal  No respiratory distress  Breath sounds: Normal breath sounds  No stridor  No wheezing, rhonchi or rales  Abdominal:      General: Bowel sounds are normal  There is no distension  Palpations: Abdomen is soft  Tenderness: There is no abdominal tenderness  There is no guarding  Musculoskeletal:      Right lower leg: No edema  Left lower leg: No edema  Lymphadenopathy:      Cervical: No cervical adenopathy  Skin:     General: Skin is warm and dry  Findings: No rash  Comments: No acute rashes   Neurological:      Mental Status: She is alert and oriented to person, place, and time     Psychiatric:         Mood and Affect: Mood normal          Behavior: Behavior normal

## 2021-04-18 LAB — SARS-COV-2 RNA RESP QL NAA+PROBE: POSITIVE

## 2021-04-19 ENCOUNTER — TELEMEDICINE (OUTPATIENT)
Dept: FAMILY MEDICINE CLINIC | Facility: CLINIC | Age: 76
End: 2021-04-19
Payer: COMMERCIAL

## 2021-04-19 DIAGNOSIS — U07.1 COVID-19 VIRUS INFECTION: Primary | ICD-10-CM

## 2021-04-19 PROCEDURE — 99442 PR PHYS/QHP TELEPHONE EVALUATION 11-20 MIN: CPT | Performed by: NURSE PRACTITIONER

## 2021-04-19 RX ORDER — DEXTROMETHORPHAN HYDROBROMIDE AND PROMETHAZINE HYDROCHLORIDE 15; 6.25 MG/5ML; MG/5ML
2.5 SOLUTION ORAL 4 TIMES DAILY PRN
Qty: 118 ML | Refills: 0 | Status: SHIPPED | OUTPATIENT
Start: 2021-04-19 | End: 2021-08-17

## 2021-04-19 NOTE — PATIENT INSTRUCTIONS
Problem List Items Addressed This Visit        Other    COVID-19 virus infection - Primary     19Apr: Day 4  Patient reporting symptoms of chills, increased fatigue, nonproductive cough, nausea, and diarrhea  Phenergan DM ordered to help with cough  Patient is currently undecided whether she wants the monoclonal antibody infusion or not  Will follow-up with patient again on 04/21/2021  Relevant Medications    Promethazine-DM (PHENERGAN-DM) 6 25-15 mg/5 mL oral syrup        COVID-19 Home Care Guidelines    Your healthcare provider and/or public health staff have evaluated you and have determined that you do not need to remain in the hospital at this time  At this time you can be isolated at home where you will be monitored by staff from your local or state health department  You should carefully follow the prevention and isolation steps below until a healthcare provider or local or state health department says that you can return to your normal activities  Stay home except to get medical care    People who are mildly ill with COVID-19 are able to isolate at home during their illness  You should restrict activities outside your home, except for getting medical care  Do not go to work, school, or public areas  Avoid using public transportation, ride-sharing, or taxis  Separate yourself from other people and animals in your home    People: As much as possible, you should stay in a specific room and away from other people in your home  Also, you should use a separate bathroom, if available  Animals: You should restrict contact with pets and other animals while you are sick with COVID-19, just like you would around other people  Although there have not been reports of pets or other animals becoming sick with COVID-19, it is still recommended that people sick with COVID-19 limit contact with animals until more information is known about the virus   When possible, have another member of your household care for your animals while you are sick  If you are sick with COVID-19, avoid contact with your pet, including petting, snuggling, being kissed or licked, and sharing food  If you must care for your pet or be around animals while you are sick, wash your hands before and after you interact with pets and wear a facemask  See COVID-19 and Animals for more information  Call ahead before visiting your doctor    If you have a medical appointment, call the healthcare provider and tell them that you have or may have COVID-19  This will help the healthcare providers office take steps to keep other people from getting infected or exposed  Wear a facemask    You should wear a facemask when you are around other people (e g , sharing a room or vehicle) or pets and before you enter a healthcare providers office  If you are not able to wear a facemask (for example, because it causes trouble breathing), then people who live with you should not stay in the same room with you, or they should wear a facemask if they enter your room  Cover your coughs and sneezes    Cover your mouth and nose with a tissue when you cough or sneeze  Throw used tissues in a lined trash can  Immediately wash your hands with soap and water for at least 20 seconds or, if soap and water are not available, clean your hands with an alcohol-based hand  that contains at least 60% alcohol  Clean your hands often    Wash your hands often with soap and water for at least 20 seconds, especially after blowing your nose, coughing, or sneezing; going to the bathroom; and before eating or preparing food  If soap and water are not readily available, use an alcohol-based hand  with at least 60% alcohol, covering all surfaces of your hands and rubbing them together until they feel dry  Soap and water are the best option if hands are visibly dirty  Avoid touching your eyes, nose, and mouth with unwashed hands      Avoid sharing personal household items    You should not share dishes, drinking glasses, cups, eating utensils, towels, or bedding with other people or pets in your home  After using these items, they should be washed thoroughly with soap and water  Clean all high-touch surfaces everyday    High touch surfaces include counters, tabletops, doorknobs, bathroom fixtures, toilets, phones, keyboards, tablets, and bedside tables  Also, clean any surfaces that may have blood, stool, or body fluids on them  Use a household cleaning spray or wipe, according to the label instructions  Labels contain instructions for safe and effective use of the cleaning product including precautions you should take when applying the product, such as wearing gloves and making sure you have good ventilation during use of the product  Monitor your symptoms    Seek prompt medical attention if your illness is worsening (e g , difficulty breathing)  Before seeking care, call your healthcare provider and tell them that you have, or are being evaluated for, COVID-19  Put on a facemask before you enter the facility  These steps will help the healthcare providers office to keep other people in the office or waiting room from getting infected or exposed  Ask your healthcare provider to call the local or [facility] health department  Persons who are placed under active monitoring or facilitated self-monitoring should follow instructions provided by their local health department or occupational health professionals, as appropriate  If you have a medical emergency and need to call 911, notify the dispatch personnel that you have, or are being evaluated for COVID-19  If possible, put on a facemask before emergency medical services arrive      Discontinuing home isolation    Patients with confirmed COVID-19 should remain under home isolation precautions until the following conditions are met:   - They have had no fever for at least 24 hours (that is one full day of no fever without the use medicine that reduces fevers)  AND  - other symptoms have improved (for example, when their cough or shortness of breath have improved)  AND  - If had mild or moderate illness, at least 10 days have passed since their symptoms first appeared or if severe illness (needed oxygen) or immunosuppressed, at least 20 days have passed since symptoms first appeared  Patients with confirmed COVID-19 should also notify close contacts (including their workplace) and ask that they self-quarantine  Currently, close contact is defined as being within 6 feet for 15 minutes or more from the period 24 hours starting 48 hours before symptom onset to the time at which the patient went into isolation  Close contacts of patients diagnosed with COVID-19 should be instructed by the patient to self-quarantine for 14 days from the last time of their last contact with the patient       Source: [employer]

## 2021-04-19 NOTE — ASSESSMENT & PLAN NOTE
19Apr: Day 4  Patient reporting symptoms of chills, increased fatigue, nonproductive cough, nausea, and diarrhea  Phenergan DM ordered to help with cough  Patient is currently undecided whether she wants the monoclonal antibody infusion or not  Will follow-up with patient again on 04/21/2021

## 2021-04-19 NOTE — PROGRESS NOTES
COVID-19 Outpatient Progress Note    Assessment/Plan:    Problem List Items Addressed This Visit        Other    COVID-19 virus infection - Primary     19Apr: Day 4  Patient reporting symptoms of chills, increased fatigue, nonproductive cough, nausea, and diarrhea  Phenergan DM ordered to help with cough  Patient is currently undecided whether she wants the monoclonal antibody infusion or not  Will follow-up with patient again on 04/21/2021  Relevant Medications    Promethazine-DM (PHENERGAN-DM) 6 25-15 mg/5 mL oral syrup         Disposition:     Patient already tested positive for COVID  I have spent 15 minutes directly with the patient  Encounter provider STEFFANY Ortiz    Provider located at [address] [place] [address] PRIMARY CARE  [facility] [address]    Recent Visits  No visits were found meeting these conditions  Showing recent visits within past 7 days and meeting all other requirements     Today's Visits  Date Type Provider Dept   04/19/21 Telemedicine [facility] Primary Care   Showing today's visits and meeting all other requirements     Future Appointments  No visits were found meeting these conditions  Showing future appointments within next 150 days and meeting all other requirements        Patient agrees to participate in a virtual check in via telephone or video visit instead of presenting to the office to address urgent/immediate medical needs  Patient is aware this is a billable service  After connecting through Telephone, the patient was identified by name and date of birth  Domo Rincon was informed that this was a telemedicine visit and that the exam was being conducted confidentially over secure lines  My office door was closed  No one else was in the room  Domo Rincon acknowledged consent and understanding of privacy and security of the telemedicine visit   I informed the patient that I have reviewed her record in Epic and presented the opportunity for her to ask any questions regarding the visit today  The patient agreed to participate  It was my intent to perform this visit via video technology but the patient was not able to do a video connection so the visit was completed via audio telephone only  Subjective:   Jack Garcia is a 67 y o  female who is concerned about COVID-19  Patient's symptoms include chills, fatigue, malaise, nasal congestion, rhinorrhea, cough (non-productive ), nausea and diarrhea  Patient denies fever, sore throat, anosmia, loss of taste, shortness of breath, chest tightness, abdominal pain, vomiting, myalgias and headaches  Date of symptom onset: 4/15/2021    Exposure:   Contact with a person who is under investigation (PUI) for or who is positive for COVID-19 within the last 14 days?: No    Hospitalized recently for fever and/or lower respiratory symptoms?: No      Currently a healthcare worker that is involved in direct patient care?: No      Works in a special setting where the risk of COVID-19 transmission may be high? (this may include long-term care, correctional and jail facilities; homeless shelters; assisted-living facilities and group homes ): No      Resident in a special setting where the risk of COVID-19 transmission may be high? (this may include long-term care, correctional and jail facilities; homeless shelters; assisted-living facilities and group homes ): No      Patient tested positive for COVID today  Patient is reporting symptoms of chills, intermittent nonproductive cough, rhinorrhea, nasal congestion, diarrhea, nausea, and increased fatigue  Patient denies any fevers or shortness of breath  The patient is a candidate for monoclonal antibody infusion  I spoke with patient about the risks and benefits of the infusion and she states she would like more time to think about if she wants the infusion or not      Lab Results   Component Value Date SARSCOV2 Positive (A) 04/16/2021     Past Medical History:   Diagnosis Date    Arrhythmia     Diabetes mellitus ([facility] [address] [facility]     Hyperlipidemia     Hypertension     Type 2 diabetes mellitus with hyperglycemia ([facility] [address] )     last assessed: 9/19/2014     Past Surgical History:   Procedure Laterality Date    APPENDECTOMY      BREAST SURGERY      Reduction Procedure bilateral    KNEE SURGERY      LITHOTRIPSY      renal    CO OPEN TX PHALANGEAL SHAFT FRACTURE PROX/MIDDLE EA Left 10/15/2019    Procedure: Closed reduction percutaneous pinning;  Surgeon:  Ginette Mcneal MD;  Location: [address] [place] [address];  Service: Orthopedics    TONSILLECTOMY AND ADENOIDECTOMY       Current Outpatient Medications   Medication Sig Dispense Refill    amLODIPine (NORVASC) 5 mg tablet TAKE 1 TABLET BY MOUTH EVERY DAY 90 tablet 0    atenolol (TENORMIN) 25 mg tablet Take 1 tablet (25 mg total) by mouth daily at bedtime 90 tablet 3    Blood Glucose Monitoring Suppl (FREESTYLE FREEDOM LITE) w/Device KIT by Does not apply route      citalopram (CeleXA) 20 mg tablet TAKE 1 TABLET BY MOUTH EVERY DAY 90 tablet 3    Cranberry 125 MG TABS Take by mouth      cyclobenzaprine (FLEXERIL) 10 mg tablet Take 1 tablet (10 mg total) by mouth daily at bedtime 30 tablet 0    furosemide (LASIX) 40 mg tablet Take by mouth      glucose blood (FREESTYLE LITE) test strip by In Vitro route      Januvia 100 MG tablet TAKE 1 TABLET BY MOUTH EVERY DAY 90 tablet 1    metFORMIN (GLUCOPHAGE) 1000 MG tablet TAKE 1 TABLET BY MOUTH TWICE A  tablet 1    Multiple Vitamin (MULTIVITAMINS PO) Take 1 capsule by mouth daily      ondansetron (ZOFRAN-ODT) 4 mg disintegrating tablet Take 1 tablet (4 mg total) by mouth every 6 (six) hours as needed for nausea or vomiting 20 tablet 0    Promethazine-DM (PHENERGAN-DM) 6 25-15 mg/5 mL oral syrup Take 2 5 mL by mouth 4 (four) times a day as needed for cough 118 mL 0    simvastatin (ZOCOR) 40 mg tablet TAKE 1 TABLET BY MOUTH EVERY DAY 90 tablet 3     No current facility-administered medications for this visit  Allergies   Allergen Reactions    Penicillins Anaphylaxis       Review of Systems   Constitutional: Positive for chills and fatigue  Negative for fever  HENT: Positive for congestion and rhinorrhea  Negative for sore throat  Eyes: Negative  Respiratory: Positive for cough (non-productive )  Negative for chest tightness and shortness of breath  Cardiovascular: Negative for chest pain and palpitations  Gastrointestinal: Positive for diarrhea and nausea  Negative for abdominal pain and vomiting  Endocrine: Negative  Genitourinary: Negative  Musculoskeletal: Negative for myalgias  Skin: Negative  Allergic/Immunologic: Negative  Neurological: Negative for headaches  Hematological: Negative  Psychiatric/Behavioral: Negative  Objective: There were no vitals filed for this visit  Physical Exam  Vitals reviewed: limited due to phone only exam    Neurological:      Mental Status: She is alert  VIRTUAL VISIT DISCLAIMER    Tez Lua acknowledges that she has consented to an online visit or consultation  She understands that the online visit is based solely on information provided by her, and that, in the absence of a face-to-face physical evaluation by the physician, the diagnosis she receives is both limited and provisional in terms of accuracy and completeness  This is not intended to replace a full medical face-to-face evaluation by the physician  Tez Lua understands and accepts these terms

## 2021-04-21 ENCOUNTER — TELEMEDICINE (OUTPATIENT)
Dept: FAMILY MEDICINE CLINIC | Facility: CLINIC | Age: 76
End: 2021-04-21
Payer: COMMERCIAL

## 2021-04-21 DIAGNOSIS — U07.1 COVID-19 VIRUS INFECTION: Primary | ICD-10-CM

## 2021-04-21 DIAGNOSIS — I10 HYPERTENSION, UNSPECIFIED TYPE: ICD-10-CM

## 2021-04-21 PROCEDURE — 99442 PR PHYS/QHP TELEPHONE EVALUATION 11-20 MIN: CPT | Performed by: NURSE PRACTITIONER

## 2021-04-21 RX ORDER — ATENOLOL 25 MG/1
TABLET ORAL
Qty: 90 TABLET | Refills: 3 | Status: SHIPPED | OUTPATIENT
Start: 2021-04-21 | End: 2021-08-17 | Stop reason: SDUPTHER

## 2021-04-21 NOTE — PROGRESS NOTES
COVID-19 Outpatient Progress Note    Assessment/Plan:    Problem List Items Addressed This Visit        Other    COVID-19 virus infection - Primary     21Apr: Day 6  Patient reports that her cough and diarrhea have improved  The rest of her symptoms have remained stable  Patient refused monoclonal antibody infusion  Will follow up with patient again on 04/23/2021  Disposition:     I recommended continued isolation until at least 24 hours have passed since recovery defined as resolution of fever without the use of fever-reducing medications AND improvement in COVID symptoms AND 10 days have passed since onset of symptoms (or 10 days have passed since date of first positive viral diagnostic test for asymptomatic patients)  I have spent 15 minutes directly with the patient  Encounter provider STEFFANY Wolf    Provider located at [address] [place] [id number]    Recent Visits  Date Type Provider Dept   04/19/21 Telemedicine [facility]   Showing recent visits within past 7 days and meeting all other requirements     Today's Visits  Date Type Provider Dept   04/21/21 [facility]medicine [facility]   Showing today's visits and meeting all other requirements     Future Appointments  No visits were found meeting these conditions  Showing future appointments within next 150 days and meeting all other requirements        Patient agrees to participate in a virtual check in via telephone or video visit instead of presenting to the office to address urgent/immediate medical needs  Patient is aware this is a billable service  After connecting through Telephone, the patient was identified by name and date of birth  Irena Odonnell was informed that this was a telemedicine visit and that the exam was being conducted confidentially over secure lines  My office door was closed   No one else was in the room  Vera Dang acknowledged consent and understanding of privacy and security of the telemedicine visit  I informed the patient that I have reviewed her record in Epic and presented the opportunity for her to ask any questions regarding the visit today  The patient agreed to participate  It was my intent to perform this visit via video technology but the patient was not able to do a video connection so the visit was completed via audio telephone only  Subjective:   Vera Dang is a 67 y o  female who has been screened for COVID-19  Symptom change since last report: improving  Patient's symptoms include fatigue, malaise, nasal congestion, rhinorrhea, cough (non-productive ) and diarrhea  Patient denies fever, chills, sore throat, anosmia, loss of taste, shortness of breath, chest tightness, abdominal pain, nausea, vomiting, myalgias and headaches  Zee Finch has been staying home and has isolated themselves in her home  She is taking care to not share personal items and is cleaning all surfaces that are touched often, like counters, tabletops, and doorknobs using household cleaning sprays or wipes  She is wearing a mask when she leaves her room  Date of symptom onset: 4/15/2021  Date of positive COVID-19 PCR: 4/16/2021    Patient is reporting symptoms of intermittent nonproductive cough, rhinorrhea, nasal congestion, intermittent diarrhea, and increased fatigue  Patient was unable to begin Phenergan DM due to the medication not being covered by her insurance  However, she states that her cough has improved  She also reports that her diarrhea has improved as well and she is not using the bathroom as frequently  Patient denies any fevers or shortness of breath      Lab Results   Component Value Date    SARSCOV2 Positive (A) 04/16/2021     Past Medical History:   Diagnosis Date    Arrhythmia     Diabetes mellitus ([facility] [address] )     Hyperlipidemia     Hypertension     Type 2 diabetes mellitus with hyperglycemia ([facility] [address] )     last assessed: 9/19/2014     Past Surgical History:   Procedure Laterality Date    APPENDECTOMY      BREAST SURGERY      Reduction Procedure bilateral    KNEE SURGERY      LITHOTRIPSY      renal    DC OPEN TX PHALANGEAL SHAFT FRACTURE PROX/MIDDLE EA Left 10/15/2019    Procedure: Closed reduction percutaneous pinning;  Surgeon: Arnulfo Galindo MD;  Location: [address] [place] [address];  Service: Orthopedics    TONSILLECTOMY AND ADENOIDECTOMY       Current Outpatient Medications   Medication Sig Dispense Refill    amLODIPine (NORVASC) 5 mg tablet TAKE 1 TABLET BY MOUTH EVERY DAY 90 tablet 0    atenolol (TENORMIN) 25 mg tablet TAKE 1 TABLET BY MOUTH DAILY AT BEDTIME 90 tablet 3    Blood Glucose Monitoring Suppl (FREESTYLE FREEDOM LITE) w/Device KIT by Does not apply route      citalopram (CeleXA) 20 mg tablet TAKE 1 TABLET BY MOUTH EVERY DAY 90 tablet 3    Cranberry 125 MG TABS Take by mouth      cyclobenzaprine (FLEXERIL) 10 mg tablet Take 1 tablet (10 mg total) by mouth daily at bedtime 30 tablet 0    furosemide (LASIX) 40 mg tablet Take by mouth      glucose blood (FREESTYLE LITE) test strip by In Vitro route      Januvia 100 MG tablet TAKE 1 TABLET BY MOUTH EVERY DAY 90 tablet 1    metFORMIN (GLUCOPHAGE) 1000 MG tablet TAKE 1 TABLET BY MOUTH TWICE A  tablet 1    Multiple Vitamin (MULTIVITAMINS PO) Take 1 capsule by mouth daily      ondansetron (ZOFRAN-ODT) 4 mg disintegrating tablet Take 1 tablet (4 mg total) by mouth every 6 (six) hours as needed for nausea or vomiting 20 tablet 0    Promethazine-DM (PHENERGAN-DM) 6 25-15 mg/5 mL oral syrup Take 2 5 mL by mouth 4 (four) times a day as needed for cough 118 mL 0    simvastatin (ZOCOR) 40 mg tablet TAKE 1 TABLET BY MOUTH EVERY DAY 90 tablet 3     No current facility-administered medications for this visit        Allergies   Allergen Reactions    Penicillins Anaphylaxis       Review of Systems   Constitutional: Positive for fatigue  Negative for chills and fever  HENT: Positive for congestion and rhinorrhea  Negative for sore throat  Eyes: Negative  Respiratory: Positive for cough (non-productive )  Negative for chest tightness and shortness of breath  Cardiovascular: Negative for chest pain and palpitations  Gastrointestinal: Positive for diarrhea  Negative for abdominal pain, nausea and vomiting  Endocrine: Negative  Genitourinary: Negative  Musculoskeletal: Negative for myalgias  Skin: Negative  Allergic/Immunologic: Negative  Neurological: Negative for headaches  Hematological: Negative  Psychiatric/Behavioral: Negative  Objective: There were no vitals filed for this visit  Physical Exam  Vitals reviewed: limited due to phone only exam    Neurological:      Mental Status: She is alert  VIRTUAL VISIT DISCLAIMER    Domo Rincon acknowledges that she has consented to an online visit or consultation  She understands that the online visit is based solely on information provided by her, and that, in the absence of a face-to-face physical evaluation by the physician, the diagnosis she receives is both limited and provisional in terms of accuracy and completeness  This is not intended to replace a full medical face-to-face evaluation by the physician  Domo Rincon understands and accepts these terms

## 2021-04-21 NOTE — PATIENT INSTRUCTIONS
Problem List Items Addressed This Visit        Other    COVID-19 virus infection - Primary     21Apr: Day 6  Patient reports that her cough and diarrhea have improved  The rest of her symptoms have remained stable  Will follow up with patient again on 04/23/2021  [address]    Your healthcare provider and/or public health staff have evaluated you and have determined that you do not need to remain in the hospital at this time  At this time you can be isolated at home where you will be monitored by staff from your local or state health department  You should carefully follow the prevention and isolation steps below until a healthcare provider or local or state health department says that you can return to your normal activities  Stay home except to get medical care    People who are mildly ill with COVID-19 are able to isolate at home during their illness  You should restrict activities outside your home, except for getting medical care  Do not go to work, school, or public areas  Avoid using public transportation, ride-sharing, or taxis  Separate yourself from other people and animals in your home    People: As much as possible, you should stay in a specific room and away from other people in your home  Also, you should use a separate bathroom, if available  Animals: You should restrict contact with pets and other animals while you are sick with COVID-19, just like you would around other people  Although there have not been reports of pets or other animals becoming sick with COVID-19, it is still recommended that people sick with COVID-19 limit contact with animals until more information is known about the virus  When possible, have another member of your household care for your animals while you are sick  If you are sick with COVID-19, avoid contact with your pet, including petting, snuggling, being kissed or licked, and sharing food   If you must care for your pet or be around animals while you are sick, wash your hands before and after you interact with pets and wear a facemask  See COVID-19 and Animals for more information  Call ahead before visiting your doctor    If you have a medical appointment, call the healthcare provider and tell them that you have or may have COVID-19  This will help the healthcare providers office take steps to keep other people from getting infected or exposed  Wear a facemask    You should wear a facemask when you are around other people (e g , sharing a room or vehicle) or pets and before you enter a healthcare providers office  If you are not able to wear a facemask (for example, because it causes trouble breathing), then people who live with you should not stay in the same room with you, or they should wear a facemask if they enter your room  Cover your coughs and sneezes    Cover your mouth and nose with a tissue when you cough or sneeze  Throw used tissues in a lined trash can  Immediately wash your hands with soap and water for at least 20 seconds or, if soap and water are not available, clean your hands with an alcohol-based hand  that contains at least 60% alcohol  Clean your hands often    Wash your hands often with soap and water for at least 20 seconds, especially after blowing your nose, coughing, or sneezing; going to the bathroom; and before eating or preparing food  If soap and water are not readily available, use an alcohol-based hand  with at least 60% alcohol, covering all surfaces of your hands and rubbing them together until they feel dry  Soap and water are the best option if hands are visibly dirty  Avoid touching your eyes, nose, and mouth with unwashed hands  Avoid sharing personal household items    You should not share dishes, drinking glasses, cups, eating utensils, towels, or bedding with other people or pets in your home   After using these items, they should be washed thoroughly with soap and water  Clean all high-touch surfaces everyday    High touch surfaces include counters, tabletops, doorknobs, bathroom fixtures, toilets, phones, keyboards, tablets, and bedside tables  Also, clean any surfaces that may have blood, stool, or body fluids on them  Use a household cleaning spray or wipe, according to the label instructions  Labels contain instructions for safe and effective use of the cleaning product including precautions you should take when applying the product, such as wearing gloves and making sure you have good ventilation during use of the product  Monitor your symptoms    Seek prompt medical attention if your illness is worsening (e g , difficulty breathing)  Before seeking care, call your healthcare provider and tell them that you have, or are being evaluated for, COVID-19  Put on a facemask before you enter the facility  These steps will help the healthcare providers office to keep other people in the office or waiting room from getting infected or exposed  Ask your healthcare provider to call the local or [facility] health department  Persons who are placed under active monitoring or facilitated self-monitoring should follow instructions provided by their local health department or occupational health professionals, as appropriate  If you have a medical emergency and need to call 911, notify the dispatch personnel that you have, or are being evaluated for COVID-19  If possible, put on a facemask before emergency medical services arrive      Discontinuing home isolation    Patients with confirmed COVID-19 should remain under home isolation precautions until the following conditions are met:   - They have had no fever for at least 24 hours (that is one full day of no fever without the use medicine that reduces fevers)  AND  - other symptoms have improved (for example, when their cough or shortness of breath have improved)  AND  - If had mild or moderate illness, at least 10 days have passed since their symptoms first appeared or if severe illness (needed oxygen) or immunosuppressed, at least 20 days have passed since symptoms first appeared  Patients with confirmed COVID-19 should also notify close contacts (including their workplace) and ask that they self-quarantine  Currently, close contact is defined as being within 6 feet for 15 minutes or more from the period 24 hours starting 48 hours before symptom onset to the time at which the patient went into isolation  Close contacts of patients diagnosed with COVID-19 should be instructed by the patient to self-quarantine for 14 days from the last time of their last contact with the patient       Source: [employer]

## 2021-04-21 NOTE — ASSESSMENT & PLAN NOTE
21Apr: Day 6  Patient reports that her cough and diarrhea have improved  The rest of her symptoms have remained stable  Patient refused monoclonal antibody infusion  Will follow up with patient again on 04/23/2021

## 2021-04-23 ENCOUNTER — TELEMEDICINE (OUTPATIENT)
Dept: FAMILY MEDICINE CLINIC | Facility: CLINIC | Age: 76
End: 2021-04-23
Payer: COMMERCIAL

## 2021-04-23 DIAGNOSIS — U07.1 COVID-19 VIRUS INFECTION: Primary | ICD-10-CM

## 2021-04-23 PROCEDURE — 99442 PR PHYS/QHP TELEPHONE EVALUATION 11-20 MIN: CPT | Performed by: NURSE PRACTITIONER

## 2021-04-23 NOTE — PATIENT INSTRUCTIONS
Problem List Items Addressed This Visit        Other    COVID-19 virus infection - Primary     23Apr: Day 8  The patient's diarrhea has continued to improve and she states it is now minimal   The rest of the patient's symptoms have remained stable  I will follow up with the patient again on 04/26/2021  [address]    Your healthcare provider and/or public health staff have evaluated you and have determined that you do not need to remain in the hospital at this time  At this time you can be isolated at home where you will be monitored by staff from your local or state health department  You should carefully follow the prevention and isolation steps below until a healthcare provider or local or state health department says that you can return to your normal activities  Stay home except to get medical care    People who are mildly ill with COVID-19 are able to isolate at home during their illness  You should restrict activities outside your home, except for getting medical care  Do not go to work, school, or public areas  Avoid using public transportation, ride-sharing, or taxis  Separate yourself from other people and animals in your home    People: As much as possible, you should stay in a specific room and away from other people in your home  Also, you should use a separate bathroom, if available  Animals: You should restrict contact with pets and other animals while you are sick with COVID-19, just like you would around other people  Although there have not been reports of pets or other animals becoming sick with COVID-19, it is still recommended that people sick with COVID-19 limit contact with animals until more information is known about the virus  When possible, have another member of your household care for your animals while you are sick  If you are sick with COVID-19, avoid contact with your pet, including petting, snuggling, being kissed or licked, and sharing food  If you must care for your pet or be around animals while you are sick, wash your hands before and after you interact with pets and wear a facemask  See COVID-19 and Animals for more information  Call ahead before visiting your doctor    If you have a medical appointment, call the healthcare provider and tell them that you have or may have COVID-19  This will help the healthcare providers office take steps to keep other people from getting infected or exposed  Wear a facemask    You should wear a facemask when you are around other people (e g , sharing a room or vehicle) or pets and before you enter a healthcare providers office  If you are not able to wear a facemask (for example, because it causes trouble breathing), then people who live with you should not stay in the same room with you, or they should wear a facemask if they enter your room  Cover your coughs and sneezes    Cover your mouth and nose with a tissue when you cough or sneeze  Throw used tissues in a lined trash can  Immediately wash your hands with soap and water for at least 20 seconds or, if soap and water are not available, clean your hands with an alcohol-based hand  that contains at least 60% alcohol  Clean your hands often    Wash your hands often with soap and water for at least 20 seconds, especially after blowing your nose, coughing, or sneezing; going to the bathroom; and before eating or preparing food  If soap and water are not readily available, use an alcohol-based hand  with at least 60% alcohol, covering all surfaces of your hands and rubbing them together until they feel dry  Soap and water are the best option if hands are visibly dirty  Avoid touching your eyes, nose, and mouth with unwashed hands  Avoid sharing personal household items    You should not share dishes, drinking glasses, cups, eating utensils, towels, or bedding with other people or pets in your home   After using these items, they should be washed thoroughly with soap and water  Clean all high-touch surfaces everyday    High touch surfaces include counters, tabletops, doorknobs, bathroom fixtures, toilets, phones, keyboards, tablets, and bedside tables  Also, clean any surfaces that may have blood, stool, or body fluids on them  Use a household cleaning spray or wipe, according to the label instructions  Labels contain instructions for safe and effective use of the cleaning product including precautions you should take when applying the product, such as wearing gloves and making sure you have good ventilation during use of the product  Monitor your symptoms    Seek prompt medical attention if your illness is worsening (e g , difficulty breathing)  Before seeking care, call your healthcare provider and tell them that you have, or are being evaluated for, COVID-19  Put on a facemask before you enter the facility  These steps will help the healthcare providers office to keep other people in the office or waiting room from getting infected or exposed  Ask your healthcare provider to call the local or [facility] health department  Persons who are placed under active monitoring or facilitated self-monitoring should follow instructions provided by their local health department or occupational health professionals, as appropriate  If you have a medical emergency and need to call 911, notify the dispatch personnel that you have, or are being evaluated for COVID-19  If possible, put on a facemask before emergency medical services arrive      Discontinuing home isolation    Patients with confirmed COVID-19 should remain under home isolation precautions until the following conditions are met:   - They have had no fever for at least 24 hours (that is one full day of no fever without the use medicine that reduces fevers)  AND  - other symptoms have improved (for example, when their cough or shortness of breath have improved)  AND  - If had mild or moderate illness, at least 10 days have passed since their symptoms first appeared or if severe illness (needed oxygen) or immunosuppressed, at least 20 days have passed since symptoms first appeared  Patients with confirmed COVID-19 should also notify close contacts (including their workplace) and ask that they self-quarantine  Currently, close contact is defined as being within 6 feet for 15 minutes or more from the period 24 hours starting 48 hours before symptom onset to the time at which the patient went into isolation  Close contacts of patients diagnosed with COVID-19 should be instructed by the patient to self-quarantine for 14 days from the last time of their last contact with the patient       Source: [employer]

## 2021-04-23 NOTE — PROGRESS NOTES
COVID-19 Outpatient Progress Note    Assessment/Plan:    Problem List Items Addressed This Visit        Other    COVID-19 virus infection - Primary     23Apr: Day 8  The patient's diarrhea has continued to improve and she states it is now minimal   The rest of the patient's symptoms have remained stable  I will follow up with the patient again on 04/26/2021  Disposition:     I recommended continued isolation until at least 24 hours have passed since recovery defined as resolution of fever without the use of fever-reducing medications AND improvement in COVID symptoms AND 10 days have passed since onset of symptoms (or 10 days have passed since date of first positive viral diagnostic test for asymptomatic patients)  I have spent 15 minutes directly with the patient  Encounter provider STEFFANY Talamantes    Provider located at [address] [place] [phone]    Recent Visits  Date Type Provider Dept   04/21/21 Telemedicine STEFFANY Talamantes [facility]   04/19/21 [facility]icine [facility]   Showing recent visits within past 7 days and meeting all other requirements     Today's Visits  Date Type Provider Dept   04/23/21 [facility]icine [facility]   Showing today's visits and meeting all other requirements     Future Appointments  No visits were found meeting these conditions  Showing future appointments within next 150 days and meeting all other requirements        Patient agrees to participate in a virtual check in via telephone or video visit instead of presenting to the office to address urgent/immediate medical needs  Patient is aware this is a billable service  After connecting through Telephone, the patient was identified by name and date of birth   King Barton was informed that this was a telemedicine visit and that the exam was being conducted confidentially over secure lines  My office door was closed  No one else was in the room  Wellington Sanchez acknowledged consent and understanding of privacy and security of the telemedicine visit  I informed the patient that I have reviewed her record in Epic and presented the opportunity for her to ask any questions regarding the visit today  The patient agreed to participate  It was my intent to perform this visit via video technology but the patient was not able to do a video connection so the visit was completed via audio telephone only  Subjective:   Wellington Sanchez is a 67 y o  female who has been screened for COVID-19  Symptom change since last report: improving  Patient's symptoms include fatigue, malaise, nasal congestion, rhinorrhea, cough (productive ) and diarrhea  Patient denies fever, chills, sore throat, anosmia, loss of taste, shortness of breath, chest tightness, abdominal pain, nausea, vomiting, myalgias and headaches  Stephany Brandt has been staying home and has isolated themselves in her home  She is taking care to not share personal items and is cleaning all surfaces that are touched often, like counters, tabletops, and doorknobs using household cleaning sprays or wipes  She is wearing a mask when she leaves her room  Date of symptom onset: 4/15/2021  Date of positive COVID-19 PCR: 4/16/2021    Patient is reporting symptoms of intermittent productive cough, rhinorrhea, nasal congestion, intermittent diarrhea, and increased fatigue  She does report that her diarrhea has improved and is now minimal   The patient denies any fevers or shortness of breath      Lab Results   Component Value Date    SARSCOV2 Positive (A) 04/16/2021     Past Medical History:   Diagnosis Date    Arrhythmia     [facility]     Hyperlipidemia     Hypertension     Type 2 diabetes mellitus with hyperglycemia ([facility] )     last assessed: 9/19/2014     Past Surgical History:   Procedure Laterality Date    APPENDECTOMY      BREAST SURGERY      Reduction Procedure bilateral    KNEE SURGERY      LITHOTRIPSY      renal    NM OPEN TX PHALANGEAL SHAFT FRACTURE PROX/MIDDLE EA Left 10/15/2019    Procedure: Closed reduction percutaneous pinning;  Surgeon: Yissel Mejía MD;  Location: [address] [place] [address] OR;  Service: Orthopedics    TONSILLECTOMY AND ADENOIDECTOMY       Current Outpatient Medications   Medication Sig Dispense Refill    amLODIPine (NORVASC) 5 mg tablet TAKE 1 TABLET BY MOUTH EVERY DAY 90 tablet 0    atenolol (TENORMIN) 25 mg tablet TAKE 1 TABLET BY MOUTH DAILY AT BEDTIME 90 tablet 3    Blood Glucose Monitoring Suppl (FREESTYLE FREEDOM LITE) w/Device KIT by Does not apply route      citalopram (CeleXA) 20 mg tablet TAKE 1 TABLET BY MOUTH EVERY DAY 90 tablet 3    Cranberry 125 MG TABS Take by mouth      cyclobenzaprine (FLEXERIL) 10 mg tablet Take 1 tablet (10 mg total) by mouth daily at bedtime 30 tablet 0    furosemide (LASIX) 40 mg tablet Take by mouth      glucose blood (FREESTYLE LITE) test strip by In Vitro route      Januvia 100 MG tablet TAKE 1 TABLET BY MOUTH EVERY DAY 90 tablet 1    metFORMIN (GLUCOPHAGE) 1000 MG tablet TAKE 1 TABLET BY MOUTH TWICE A  tablet 1    Multiple Vitamin (MULTIVITAMINS PO) Take 1 capsule by mouth daily      ondansetron (ZOFRAN-ODT) 4 mg disintegrating tablet Take 1 tablet (4 mg total) by mouth every 6 (six) hours as needed for nausea or vomiting 20 tablet 0    Promethazine-DM (PHENERGAN-DM) 6 25-15 mg/5 mL oral syrup Take 2 5 mL by mouth 4 (four) times a day as needed for cough 118 mL 0    simvastatin (ZOCOR) 40 mg tablet TAKE 1 TABLET BY MOUTH EVERY DAY 90 tablet 3     No current facility-administered medications for this visit  Allergies   Allergen Reactions    Penicillins Anaphylaxis       Review of Systems   Constitutional: Positive for fatigue  Negative for chills and fever  HENT: Positive for congestion and rhinorrhea  Negative for sore throat  Eyes: Negative  Respiratory: Positive for cough (productive )  Negative for chest tightness and shortness of breath  Cardiovascular: Negative for chest pain and palpitations  Gastrointestinal: Positive for diarrhea  Negative for abdominal pain, nausea and vomiting  Endocrine: Negative  Genitourinary: Negative  Musculoskeletal: Negative for myalgias  Skin: Negative  Allergic/Immunologic: Negative  Neurological: Negative for headaches  Hematological: Negative  Psychiatric/Behavioral: Negative  Objective: There were no vitals filed for this visit  Physical Exam  Vitals reviewed: limited due to phone only exam    Neurological:      Mental Status: She is alert  VIRTUAL VISIT DISCLAIMER    Jerod Mccauley acknowledges that she has consented to an online visit or consultation  She understands that the online visit is based solely on information provided by her, and that, in the absence of a face-to-face physical evaluation by the physician, the diagnosis she receives is both limited and provisional in terms of accuracy and completeness  This is not intended to replace a full medical face-to-face evaluation by the physician  Jerod Garrick understands and accepts these terms

## 2021-04-23 NOTE — ASSESSMENT & PLAN NOTE
23Apr: Day 8  The patient's diarrhea has continued to improve and she states it is now minimal   The rest of the patient's symptoms have remained stable  I will follow up with the patient again on 04/26/2021

## 2021-04-26 ENCOUNTER — TELEMEDICINE (OUTPATIENT)
Dept: FAMILY MEDICINE CLINIC | Facility: CLINIC | Age: 76
End: 2021-04-26
Payer: COMMERCIAL

## 2021-04-26 DIAGNOSIS — U07.1 COVID-19 VIRUS INFECTION: Primary | ICD-10-CM

## 2021-04-26 PROCEDURE — 99442 PR PHYS/QHP TELEPHONE EVALUATION 11-20 MIN: CPT | Performed by: NURSE PRACTITIONER

## 2021-04-26 NOTE — PROGRESS NOTES
COVID-19 Outpatient Progress Note    Assessment/Plan:    Problem List Items Addressed This Visit        Other    COVID-19 virus infection - Primary     26Apr: Day 6  Patient is cleared to return from isolation  No further follow-up is needed  Disposition:     Patient is cleared to return from isolation  I have spent 15 minutes directly with the patient  Encounter provider STEFFANY Schumacher Ace    Provider located at [address] [place] [phone]    Recent Visits  Date Type Provider Dept   04/23/21 STEFFANY Go [facility]   04/21/21 [facility] STEFFANY Martines [facility]   04/19/21 [facility]   Showing recent visits within past 7 days and meeting all other requirements     Today's Visits  Date Type Provider Dept   04/26/21 [facility]   Showing today's visits and meeting all other requirements     Future Appointments  No visits were found meeting these conditions  Showing future appointments within next 150 days and meeting all other requirements        Patient agrees to participate in a virtual check in via telephone or video visit instead of presenting to the office to address urgent/immediate medical needs  Patient is aware this is a billable service  After connecting through Telephone, the patient was identified by name and date of birth  Alexander Cottrell was informed that this was a telemedicine visit and that the exam was being conducted confidentially over secure lines  My office door was closed  No one else was in the room  Alexander Cottrell acknowledged consent and understanding of privacy and security of the telemedicine visit  I informed the patient that I have reviewed her record in Epic and presented the opportunity for her to ask any questions regarding the visit today   The patient agreed to participate  It was my intent to perform this visit via video technology but the patient was not able to do a video connection so the visit was completed via audio telephone only  Subjective:   King Barton is a 67 y o  female who has been screened for COVID-19  Symptom change since last report: improving  Patient's symptoms include fatigue, malaise, nasal congestion, rhinorrhea and cough (productive )  Patient denies fever, chills, sore throat, anosmia, loss of taste, shortness of breath, chest tightness, abdominal pain, nausea, vomiting, diarrhea, myalgias and headaches  Wesley Marion has been staying home and has isolated themselves in her home  She is taking care to not share personal items and is cleaning all surfaces that are touched often, like counters, tabletops, and doorknobs using household cleaning sprays or wipes  She is wearing a mask when she leaves her room  Date of symptom onset: 4/15/2021  Date of positive COVID-19 PCR: 4/16/2021    Patient reporting mild symptoms of intermittent productive cough, rhinorrhea, nasal congestion, and increased fatigue  The patient has been afebrile for over 24 hours without the use of antipyretics and denies any shortness of breath  It has been over 10 days since the patient's symptoms 1st began therefore, I will clear the patient to return from isolation  Patient was advised to continue to mask when in public and when around others and to continue diligent hand hygiene      Lab Results   Component Value Date    SARSCOV2 Positive (A) 04/16/2021     Past Medical History:   Diagnosis Date    Arrhythmia     [facility] mellitus [facility]     Hyperlipidemia     Hypertension     [facility]     last assessed: 9/19/2014     Past Surgical History:   Procedure Laterality Date    APPENDECTOMY      BREAST SURGERY      Reduction Procedure bilateral    KNEE SURGERY      LITHOTRIPSY      renal    RI OPEN TX PHALANGEAL SHAFT FRACTURE PROX/MIDDLE EA Left 10/15/2019    Procedure: Closed reduction percutaneous pinning;  Surgeon: Maurice Kovacs MD;  Location: [address] [place] [address] OR;  Service: Orthopedics    TONSILLECTOMY AND ADENOIDECTOMY       Current Outpatient Medications   Medication Sig Dispense Refill    amLODIPine (NORVASC) 5 mg tablet TAKE 1 TABLET BY MOUTH EVERY DAY 90 tablet 0    atenolol (TENORMIN) 25 mg tablet TAKE 1 TABLET BY MOUTH DAILY AT BEDTIME 90 tablet 3    Blood Glucose Monitoring Suppl (FREESTYLE FREEDOM LITE) w/Device KIT by Does not apply route      citalopram (CeleXA) 20 mg tablet TAKE 1 TABLET BY MOUTH EVERY DAY 90 tablet 3    Cranberry 125 MG TABS Take by mouth      cyclobenzaprine (FLEXERIL) 10 mg tablet Take 1 tablet (10 mg total) by mouth daily at bedtime 30 tablet 0    furosemide (LASIX) 40 mg tablet Take by mouth      glucose blood (FREESTYLE LITE) test strip by In Vitro route      Januvia 100 MG tablet TAKE 1 TABLET BY MOUTH EVERY DAY 90 tablet 1    metFORMIN (GLUCOPHAGE) 1000 MG tablet TAKE 1 TABLET BY MOUTH TWICE A  tablet 1    Multiple Vitamin (MULTIVITAMINS PO) Take 1 capsule by mouth daily      ondansetron (ZOFRAN-ODT) 4 mg disintegrating tablet Take 1 tablet (4 mg total) by mouth every 6 (six) hours as needed for nausea or vomiting 20 tablet 0    Promethazine-DM (PHENERGAN-DM) 6 25-15 mg/5 mL oral syrup Take 2 5 mL by mouth 4 (four) times a day as needed for cough 118 mL 0    simvastatin (ZOCOR) 40 mg tablet TAKE 1 TABLET BY MOUTH EVERY DAY 90 tablet 3     No current facility-administered medications for this visit  Allergies   Allergen Reactions    Penicillins Anaphylaxis       Review of Systems   Constitutional: Positive for fatigue  Negative for chills and fever  HENT: Positive for congestion and rhinorrhea  Negative for sore throat  Eyes: Negative  Respiratory: Positive for cough (productive )  Negative for chest tightness and shortness of breath      Cardiovascular: Negative for chest pain and palpitations  Gastrointestinal: Negative for abdominal pain, diarrhea, nausea and vomiting  Endocrine: Negative  Genitourinary: Negative  Musculoskeletal: Negative for myalgias  Skin: Negative  Allergic/Immunologic: Negative  Neurological: Negative for headaches  Hematological: Negative  Psychiatric/Behavioral: Negative  Objective: There were no vitals filed for this visit  Physical Exam  Vitals reviewed: limited due to phone only exam    Neurological:      Mental Status: She is alert  VIRTUAL VISIT DISCLAIMER    Andrea Martin acknowledges that she has consented to an online visit or consultation  She understands that the online visit is based solely on information provided by her, and that, in the absence of a face-to-face physical evaluation by the physician, the diagnosis she receives is both limited and provisional in terms of accuracy and completeness  This is not intended to replace a full medical face-to-face evaluation by the physician  Andrea Martin understands and accepts these terms

## 2021-04-26 NOTE — PATIENT INSTRUCTIONS
Problem List Items Addressed This Visit        Other    COVID-19 virus infection - Primary     26Apr: Day 6  Patient is cleared to return from isolation  No further follow-up is needed  [address]    Your healthcare provider and/or public health staff have evaluated you and have determined that you do not need to remain in the hospital at this time  At this time you can be isolated at home where you will be monitored by staff from your local or state health department  You should carefully follow the prevention and isolation steps below until a healthcare provider or local or state health department says that you can return to your normal activities  Stay home except to get medical care    People who are mildly ill with COVID-19 are able to isolate at home during their illness  You should restrict activities outside your home, except for getting medical care  Do not go to work, school, or public areas  Avoid using public transportation, ride-sharing, or taxis  Separate yourself from other people and animals in your home    People: As much as possible, you should stay in a specific room and away from other people in your home  Also, you should use a separate bathroom, if available  Animals: You should restrict contact with pets and other animals while you are sick with COVID-19, just like you would around other people  Although there have not been reports of pets or other animals becoming sick with COVID-19, it is still recommended that people sick with COVID-19 limit contact with animals until more information is known about the virus  When possible, have another member of your household care for your animals while you are sick  If you are sick with COVID-19, avoid contact with your pet, including petting, snuggling, being kissed or licked, and sharing food   If you must care for your pet or be around animals while you are sick, wash your hands before and after you interact with pets and wear a facemask  See COVID-19 and Animals for more information  Call ahead before visiting your doctor    If you have a medical appointment, call the healthcare provider and tell them that you have or may have COVID-19  This will help the healthcare providers office take steps to keep other people from getting infected or exposed  Wear a facemask    You should wear a facemask when you are around other people (e g , sharing a room or vehicle) or pets and before you enter a healthcare providers office  If you are not able to wear a facemask (for example, because it causes trouble breathing), then people who live with you should not stay in the same room with you, or they should wear a facemask if they enter your room  Cover your coughs and sneezes    Cover your mouth and nose with a tissue when you cough or sneeze  Throw used tissues in a lined trash can  Immediately wash your hands with soap and water for at least 20 seconds or, if soap and water are not available, clean your hands with an alcohol-based hand  that contains at least 60% alcohol  Clean your hands often    Wash your hands often with soap and water for at least 20 seconds, especially after blowing your nose, coughing, or sneezing; going to the bathroom; and before eating or preparing food  If soap and water are not readily available, use an alcohol-based hand  with at least 60% alcohol, covering all surfaces of your hands and rubbing them together until they feel dry  Soap and water are the best option if hands are visibly dirty  Avoid touching your eyes, nose, and mouth with unwashed hands  Avoid sharing personal household items    You should not share dishes, drinking glasses, cups, eating utensils, towels, or bedding with other people or pets in your home  After using these items, they should be washed thoroughly with soap and water      Clean all high-touch surfaces everyday    High touch surfaces include counters, tabletops, doorknobs, bathroom fixtures, toilets, phones, keyboards, tablets, and bedside tables  Also, clean any surfaces that may have blood, stool, or body fluids on them  Use a household cleaning spray or wipe, according to the label instructions  Labels contain instructions for safe and effective use of the cleaning product including precautions you should take when applying the product, such as wearing gloves and making sure you have good ventilation during use of the product  Monitor your symptoms    Seek prompt medical attention if your illness is worsening (e g , difficulty breathing)  Before seeking care, call your healthcare provider and tell them that you have, or are being evaluated for, COVID-19  Put on a facemask before you enter the facility  These steps will help the healthcare providers office to keep other people in the office or waiting room from getting infected or exposed  Ask your healthcare provider to call the local or [facility] health department  Persons who are placed under active monitoring or facilitated self-monitoring should follow instructions provided by their local health department or occupational health professionals, as appropriate  If you have a medical emergency and need to call 911, notify the dispatch personnel that you have, or are being evaluated for COVID-19  If possible, put on a facemask before emergency medical services arrive      Discontinuing home isolation    Patients with confirmed COVID-19 should remain under home isolation precautions until the following conditions are met:   - They have had no fever for at least 24 hours (that is one full day of no fever without the use medicine that reduces fevers)  AND  - other symptoms have improved (for example, when their cough or shortness of breath have improved)  AND  - If had mild or moderate illness, at least 10 days have passed since their symptoms first appeared or if severe illness (needed oxygen) or immunosuppressed, at least 20 days have passed since symptoms first appeared  Patients with confirmed COVID-19 should also notify close contacts (including their workplace) and ask that they self-quarantine  Currently, close contact is defined as being within 6 feet for 15 minutes or more from the period 24 hours starting 48 hours before symptom onset to the time at which the patient went into isolation  Close contacts of patients diagnosed with COVID-19 should be instructed by the patient to self-quarantine for 14 days from the last time of their last contact with the patient       Source: [employer]

## 2021-05-03 ENCOUNTER — APPOINTMENT (OUTPATIENT)
Dept: LAB | Facility: CLINIC | Age: 76
End: 2021-05-03
Payer: COMMERCIAL

## 2021-05-03 DIAGNOSIS — E78.2 MIXED HYPERLIPIDEMIA: ICD-10-CM

## 2021-05-03 DIAGNOSIS — I10 ESSENTIAL HYPERTENSION: ICD-10-CM

## 2021-05-03 DIAGNOSIS — E11.9 TYPE 2 DIABETES MELLITUS WITHOUT COMPLICATION, WITHOUT LONG-TERM CURRENT USE OF INSULIN (HCC): ICD-10-CM

## 2021-05-03 DIAGNOSIS — R32 URINARY INCONTINENCE, UNSPECIFIED TYPE: ICD-10-CM

## 2021-05-03 LAB
ALBUMIN SERPL BCP-MCNC: 3.9 G/DL (ref 3.5–5)
ALP SERPL-CCNC: 51 U/L (ref 46–116)
ALT SERPL W P-5'-P-CCNC: 32 U/L (ref 12–78)
ANION GAP SERPL CALCULATED.3IONS-SCNC: 3 MMOL/L (ref 4–13)
AST SERPL W P-5'-P-CCNC: 27 U/L (ref 5–45)
BASOPHILS # BLD AUTO: 0.07 THOUSANDS/ΜL (ref 0–0.1)
BASOPHILS NFR BLD AUTO: 1 % (ref 0–1)
BILIRUB SERPL-MCNC: 0.46 MG/DL (ref 0.2–1)
BUN SERPL-MCNC: 20 MG/DL (ref 5–25)
CALCIUM SERPL-MCNC: 9.5 MG/DL (ref 8.3–10.1)
CHLORIDE SERPL-SCNC: 106 MMOL/L (ref 100–108)
CHOLEST SERPL-MCNC: 136 MG/DL (ref 50–200)
CO2 SERPL-SCNC: 32 MMOL/L (ref 21–32)
CREAT SERPL-MCNC: 0.62 MG/DL (ref 0.6–1.3)
CREAT UR-MCNC: 167 MG/DL
EOSINOPHIL # BLD AUTO: 0.15 THOUSAND/ΜL (ref 0–0.61)
EOSINOPHIL NFR BLD AUTO: 2 % (ref 0–6)
ERYTHROCYTE [DISTWIDTH] IN BLOOD BY AUTOMATED COUNT: 14.1 % (ref 11.6–15.1)
GFR SERPL CREATININE-BSD FRML MDRD: 87 ML/MIN/1.73SQ M
GLUCOSE P FAST SERPL-MCNC: 95 MG/DL (ref 65–99)
HCT VFR BLD AUTO: 36.4 % (ref 34.8–46.1)
HDLC SERPL-MCNC: 61 MG/DL
HGB BLD-MCNC: 11.6 G/DL (ref 11.5–15.4)
IMM GRANULOCYTES # BLD AUTO: 0.02 THOUSAND/UL (ref 0–0.2)
IMM GRANULOCYTES NFR BLD AUTO: 0 % (ref 0–2)
LDLC SERPL CALC-MCNC: 57 MG/DL (ref 0–100)
LYMPHOCYTES # BLD AUTO: 1.95 THOUSANDS/ΜL (ref 0.6–4.47)
LYMPHOCYTES NFR BLD AUTO: 31 % (ref 14–44)
MCH RBC QN AUTO: 30.3 PG (ref 26.8–34.3)
MCHC RBC AUTO-ENTMCNC: 31.9 G/DL (ref 31.4–37.4)
MCV RBC AUTO: 95 FL (ref 82–98)
MICROALBUMIN UR-MCNC: 32.4 MG/L (ref 0–20)
MICROALBUMIN/CREAT 24H UR: 19 MG/G CREATININE (ref 0–30)
MONOCYTES # BLD AUTO: 0.51 THOUSAND/ΜL (ref 0.17–1.22)
MONOCYTES NFR BLD AUTO: 8 % (ref 4–12)
NEUTROPHILS # BLD AUTO: 3.52 THOUSANDS/ΜL (ref 1.85–7.62)
NEUTS SEG NFR BLD AUTO: 58 % (ref 43–75)
NRBC BLD AUTO-RTO: 0 /100 WBCS
PLATELET # BLD AUTO: 370 THOUSANDS/UL (ref 149–390)
PMV BLD AUTO: 10.7 FL (ref 8.9–12.7)
POTASSIUM SERPL-SCNC: 4.2 MMOL/L (ref 3.5–5.3)
PROT SERPL-MCNC: 7.5 G/DL (ref 6.4–8.2)
RBC # BLD AUTO: 3.83 MILLION/UL (ref 3.81–5.12)
SODIUM SERPL-SCNC: 141 MMOL/L (ref 136–145)
TRIGL SERPL-MCNC: 90 MG/DL
TSH SERPL DL<=0.05 MIU/L-ACNC: 1.66 UIU/ML (ref 0.36–3.74)
WBC # BLD AUTO: 6.22 THOUSAND/UL (ref 4.31–10.16)

## 2021-05-03 PROCEDURE — 80061 LIPID PANEL: CPT

## 2021-05-03 PROCEDURE — 82043 UR ALBUMIN QUANTITATIVE: CPT

## 2021-05-03 PROCEDURE — 80053 COMPREHEN METABOLIC PANEL: CPT

## 2021-05-03 PROCEDURE — 85025 COMPLETE CBC W/AUTO DIFF WBC: CPT

## 2021-05-03 PROCEDURE — 83036 HEMOGLOBIN GLYCOSYLATED A1C: CPT

## 2021-05-03 PROCEDURE — 36415 COLL VENOUS BLD VENIPUNCTURE: CPT

## 2021-05-03 PROCEDURE — 82570 ASSAY OF URINE CREATININE: CPT

## 2021-05-03 PROCEDURE — 84443 ASSAY THYROID STIM HORMONE: CPT

## 2021-05-04 LAB
EST. AVERAGE GLUCOSE BLD GHB EST-MCNC: 131 MG/DL
HBA1C MFR BLD: 6.2 %

## 2021-05-10 ENCOUNTER — RA CDI HCC (OUTPATIENT)
Dept: OTHER | Facility: HOSPITAL | Age: 76
End: 2021-05-10

## 2021-05-12 PROBLEM — E66.01 SEVERE OBESITY (BMI 35.0-35.9 WITH COMORBIDITY) (HCC): Status: ACTIVE | Noted: 2021-05-12

## 2021-05-17 ENCOUNTER — OFFICE VISIT (OUTPATIENT)
Dept: FAMILY MEDICINE CLINIC | Facility: CLINIC | Age: 76
End: 2021-05-17
Payer: COMMERCIAL

## 2021-05-17 VITALS
BODY MASS INDEX: 36.54 KG/M2 | SYSTOLIC BLOOD PRESSURE: 122 MMHG | HEIGHT: 64 IN | TEMPERATURE: 98.5 F | HEART RATE: 92 BPM | DIASTOLIC BLOOD PRESSURE: 78 MMHG | WEIGHT: 214 LBS

## 2021-05-17 DIAGNOSIS — E66.01 SEVERE OBESITY (BMI 35.0-35.9 WITH COMORBIDITY) (HCC): ICD-10-CM

## 2021-05-17 DIAGNOSIS — Z12.31 ENCOUNTER FOR SCREENING MAMMOGRAM FOR MALIGNANT NEOPLASM OF BREAST: ICD-10-CM

## 2021-05-17 DIAGNOSIS — Z00.00 HEALTH CARE MAINTENANCE: ICD-10-CM

## 2021-05-17 DIAGNOSIS — E78.2 MIXED HYPERLIPIDEMIA: ICD-10-CM

## 2021-05-17 DIAGNOSIS — E11.9 TYPE 2 DIABETES MELLITUS WITHOUT COMPLICATION, WITHOUT LONG-TERM CURRENT USE OF INSULIN (HCC): Primary | ICD-10-CM

## 2021-05-17 DIAGNOSIS — R80.9 MICROALBUMINURIA: ICD-10-CM

## 2021-05-17 PROCEDURE — 99214 OFFICE O/P EST MOD 30 MIN: CPT | Performed by: PHYSICIAN ASSISTANT

## 2021-05-17 PROCEDURE — 1170F FXNL STATUS ASSESSED: CPT | Performed by: PHYSICIAN ASSISTANT

## 2021-05-17 PROCEDURE — 1101F PT FALLS ASSESS-DOCD LE1/YR: CPT | Performed by: PHYSICIAN ASSISTANT

## 2021-05-17 PROCEDURE — 1036F TOBACCO NON-USER: CPT | Performed by: PHYSICIAN ASSISTANT

## 2021-05-17 PROCEDURE — 1160F RVW MEDS BY RX/DR IN RCRD: CPT | Performed by: PHYSICIAN ASSISTANT

## 2021-05-17 PROCEDURE — 3725F SCREEN DEPRESSION PERFORMED: CPT | Performed by: PHYSICIAN ASSISTANT

## 2021-05-17 PROCEDURE — 1125F AMNT PAIN NOTED PAIN PRSNT: CPT | Performed by: PHYSICIAN ASSISTANT

## 2021-05-17 PROCEDURE — G0439 PPPS, SUBSEQ VISIT: HCPCS | Performed by: PHYSICIAN ASSISTANT

## 2021-05-17 PROCEDURE — 3288F FALL RISK ASSESSMENT DOCD: CPT | Performed by: PHYSICIAN ASSISTANT

## 2021-05-17 NOTE — PROGRESS NOTES
Assessment and Plan:     Patient Instructions   Assessment/plan:  1  Type 2 diabetes-presently stable with hemoglobin A1c of 6 2 on metformin and Januvia therapy  No medication changes  Recommend yearly follow-up with ophthalmologist   We have tried iris machine twice with failed attempts at gradeable image  2  Micro albuminuria-recommend continue observation  This has gone down slightly and with her great glucose control I would like to continue observation before starting ACE-inhibitor  3  Benign essential hypertension-presently stable with atenolol and amlodipine, no medication changes  4  Obesity-continue with healthy diet and exercise  5  Healthcare maintenance-annual Medicare wellness visit completed  See separate note          Problem List Items Addressed This Visit        Endocrine    Type 2 diabetes mellitus without complication, without long-term current use of insulin ([facility] [address] ) - Primary    Relevant Orders    CBC and differential    Comprehensive metabolic panel    Hemoglobin A1C    Lipid Panel with Direct LDL reflex    TSH, 3rd generation    Microalbumin / creatinine urine ratio       Other    Mixed hyperlipidemia    Relevant Orders    CBC and differential    Comprehensive metabolic panel    Hemoglobin A1C    Lipid Panel with Direct LDL reflex    TSH, 3rd generation    Microalbumin / creatinine urine ratio    Severe obesity (BMI 35 0-35 9 with comorbidity) (HCC)    Relevant Orders    CBC and differential    Comprehensive metabolic panel    Hemoglobin A1C    Lipid Panel with Direct LDL reflex    TSH, 3rd generation    Microalbumin / creatinine urine ratio    Microalbuminuria    Relevant Orders    CBC and differential    Comprehensive metabolic panel    Hemoglobin A1C    Lipid Panel with Direct LDL reflex    TSH, 3rd generation    Microalbumin / creatinine urine ratio      Other Visit Diagnoses     Health care maintenance        Relevant Orders    CBC and differential    Comprehensive metabolic panel Hemoglobin A1C    Lipid Panel with Direct LDL reflex    TSH, 3rd generation    Microalbumin / creatinine urine ratio    Encounter for screening mammogram for malignant neoplasm of breast        Relevant Orders    Mammo screening bilateral w 3d & cad                 Diagnoses and all orders for this visit:    Type 2 diabetes mellitus without complication, without long-term current use of insulin ([facility] [address] )  -     Cancel: IRIS Diabetic eye exam  -     CBC and differential; Future  -     Comprehensive metabolic panel; Future  -     Hemoglobin A1C; Future  -     Lipid Panel with Direct LDL reflex; Future  -     TSH, 3rd generation; Future  -     Microalbumin / creatinine urine ratio; Future    Mixed hyperlipidemia  -     CBC and differential; Future  -     Comprehensive metabolic panel; Future  -     Hemoglobin A1C; Future  -     Lipid Panel with Direct LDL reflex; Future  -     TSH, 3rd generation; Future  -     Microalbumin / creatinine urine ratio; Future    Severe obesity (BMI 35 0-35 9 with comorbidity) (HCC)  -     CBC and differential; Future  -     Comprehensive metabolic panel; Future  -     Hemoglobin A1C; Future  -     Lipid Panel with Direct LDL reflex; Future  -     TSH, 3rd generation; Future  -     Microalbumin / creatinine urine ratio; Future    Health care maintenance  -     CBC and differential; Future  -     Comprehensive metabolic panel; Future  -     Hemoglobin A1C; Future  -     Lipid Panel with Direct LDL reflex; Future  -     TSH, 3rd generation; Future  -     Microalbumin / creatinine urine ratio; Future    Microalbuminuria  -     CBC and differential; Future  -     Comprehensive metabolic panel; Future  -     Hemoglobin A1C; Future  -     Lipid Panel with Direct LDL reflex; Future  -     TSH, 3rd generation; Future  -     Microalbumin / creatinine urine ratio;  Future    Encounter for screening mammogram for malignant neoplasm of breast  -     Mammo screening bilateral w 3d & cad; Future Subjective:      Patient ID: Linwood Barone is a 67 y o  female  CC:    Chief Complaint   Patient presents with    Follow-up     b/w f/u     Medicare Wellness Visit       HPI:    HPI:  This is a 65-year-old female who presents to the office for follow-up of chronic health conditions including type 2 diabetes, benign essential hypertension, micro albuminuria, obesity, and annual Medicare wellness visit  She has been feeling well after recent COVID infection except she still has some generalized fatigue  She is not having any shortness of breath or difficulty breathing  She has not had any new swelling around her ankles or hands and denies any chest pains  She has had recent blood work which she is here to review  She does continue metformin and Januvia for her diabetes  Her blood pressure has been stable with amlodipine and atenolol  The following portions of the patient's history were reviewed and updated as appropriate: allergies, current medications, past family history, past medical history, past social history, past surgical history and problem list       Review of Systems   Constitutional: Positive for fatigue  Negative for chills and fever  HENT: Negative for congestion, ear pain and sinus pressure  Eyes: Negative for visual disturbance  Respiratory: Negative for cough, chest tightness and shortness of breath  Cardiovascular: Negative for chest pain and palpitations  Gastrointestinal: Negative for diarrhea, nausea and vomiting  Endocrine: Negative for polyuria  Genitourinary: Negative for dysuria and frequency  Musculoskeletal: Negative for arthralgias and myalgias  Skin: Negative for pallor and rash  Neurological: Negative for dizziness, weakness, light-headedness, numbness and headaches  Psychiatric/Behavioral: Negative for agitation, behavioral problems and sleep disturbance  All other systems reviewed and are negative          Data to review: Objective:    Vitals:    05/17/21 1158   BP: 122/78   BP Location: Left arm   Patient Position: Sitting   Pulse: 92   Temp: 98 5 °F (36 9 °C)   TempSrc: Temporal   Weight: 97 1 kg (214 lb)   Height: 5' 4" (1 626 m)        Physical Exam  Constitutional:       General: She is not in acute distress  Appearance: Normal appearance  HENT:      Head: Normocephalic and atraumatic  Right Ear: Tympanic membrane normal       Left Ear: Tympanic membrane normal       Nose: No congestion or rhinorrhea  Eyes:      Conjunctiva/sclera: Conjunctivae normal       Pupils: Pupils are equal, round, and reactive to light  Neck:      Musculoskeletal: Normal range of motion and neck supple  No muscular tenderness  Vascular: No carotid bruit  Cardiovascular:      Rate and Rhythm: Normal rate and regular rhythm  Heart sounds: No murmur  Pulmonary:      Effort: Pulmonary effort is normal  No respiratory distress  Breath sounds: Normal breath sounds  Abdominal:      Palpations: Abdomen is soft  Musculoskeletal: Normal range of motion  Lymphadenopathy:      Cervical: No cervical adenopathy  Skin:     General: Skin is warm  Capillary Refill: Capillary refill takes less than 2 seconds  Neurological:      General: No focal deficit present  Mental Status: She is alert and oriented to person, place, and time  Psychiatric:         Mood and Affect: Mood normal           BMI Counseling: Body mass index is 36 73 kg/m²  The BMI is above normal  Nutrition recommendations include reducing portion sizes  BMI Counseling: Body mass index is 36 73 kg/m²  The BMI is above normal  Nutrition recommendations include decreasing portion sizes  Exercise recommendations include exercising 3-5 times per week

## 2021-05-17 NOTE — PATIENT INSTRUCTIONS
Assessment/plan:  1  Type 2 diabetes-presently stable with hemoglobin A1c of 6 2 on metformin and Januvia therapy  No medication changes  Recommend yearly follow-up with ophthalmologist   We have tried iris machine twice with failed attempts at gradeable image  2  Micro albuminuria-recommend continue observation  This has gone down slightly and with her great glucose control I would like to continue observation before starting ACE-inhibitor  3  Benign essential hypertension-presently stable with atenolol and amlodipine, no medication changes  4  Obesity-continue with healthy diet and exercise  5  Healthcare maintenance-annual Medicare wellness visit completed  See separate note

## 2021-05-17 NOTE — PROGRESS NOTES
Assessment and Plan:   1  Healthcare maintenance-annual Medicare wellness visit  Problem List Items Addressed This Visit     None           Preventive health issues were discussed with patient, and age appropriate screening tests were ordered as noted in patient's After Visit Summary  Personalized health advice and appropriate referrals for health education or preventive services given if needed, as noted in patient's After Visit Summary       History of Present Illness:     Patient presents for Medicare Annual Wellness visit    Patient Care Team:  Quentin Morales PA-C as PCP - General (Family Medicine)  MD Quentin Ortiz PA-C Ulla Gustin, MD Reese Ochs, PA-C Jessie Gens, PA-C     Problem List:     Patient Active Problem List   Diagnosis    Essential hypertension    Mixed hyperlipidemia    Type 2 diabetes mellitus without complication, without long-term current use of insulin ([address] )    Incontinence of urine    Depression with anxiety    Dupuytren's contracture    Hearing loss    Abnormal finding on mammography    LBBB (left bundle branch block)    Nocturnal leg cramps    Osteoarthritis of knee    Uncontrolled diabetes mellitus ([address] )    Baker's cyst    Preop general physical exam    Need for vaccination against Streptococcus pneumoniae using pneumococcal conjugate vaccine 13    COVID-19 virus infection    Severe obesity (BMI 35 0-35 9 with comorbidity) ([address] )      Past Medical and Surgical History:     Past Medical History:   Diagnosis Date    Arrhythmia     Diabetes mellitus ([address] )     Hyperlipidemia     Hypertension     Type 2 diabetes mellitus with hyperglycemia ([facility] [address] )     last assessed: 9/19/2014     Past Surgical History:   Procedure Laterality Date    APPENDECTOMY      BREAST SURGERY      Reduction Procedure bilateral    KNEE SURGERY      LITHOTRIPSY      renal    OK OPEN TX PHALANGEAL SHAFT FRACTURE PROX/MIDDLE EA Left 10/15/2019    Procedure: Closed reduction percutaneous pinning;  Surgeon: Paula Culp MD;  Location:  MAIN OR;  Service: Orthopedics    TONSILLECTOMY AND ADENOIDECTOMY        Family History:     Family History   Problem Relation Age of Onset    Diabetes Mother     Hypertension Mother     Colon cancer Father     Stroke Father       Social History:        Social History     Socioeconomic History    Marital status:      Spouse name: None    Number of children: None    Years of education: None    Highest education level: None   Occupational History    Occupation: Currently works part-time   Social Needs    Financial resource strain: None    Food insecurity     Worry: None     Inability: None    Transportation needs     Medical: None     Non-medical: None   Tobacco Use    Smoking status: Former Smoker     Quit date: 1968     Years since quittin 3    Smokeless tobacco: Never Used    Tobacco comment: Per allscripts - never smoker   Secondhand smoke exposire   Substance and Sexual Activity    Alcohol use: Yes     Comment: occasional    Drug use: Never    Sexual activity: None   Lifestyle    Physical activity     Days per week: None     Minutes per session: None    Stress: None   Relationships    Social connections     Talks on phone: None     Gets together: None     Attends Synagogue service: None     Active member of club or organization: None     Attends meetings of clubs or organizations: None     Relationship status: None    Intimate partner violence     Fear of current or ex partner: None     Emotionally abused: None     Physically abused: None     Forced sexual activity: None   Other Topics Concern    None   Social History Narrative    None      Medications and Allergies:     Current Outpatient Medications   Medication Sig Dispense Refill    amLODIPine (NORVASC) 5 mg tablet TAKE 1 TABLET BY MOUTH EVERY DAY 90 tablet 0    atenolol (TENORMIN) 25 mg tablet TAKE 1 TABLET BY MOUTH DAILY AT BEDTIME 90 tablet 3    Blood Glucose Monitoring Suppl (FREESTYLE FREEDOM LITE) w/Device KIT by Does not apply route      citalopram (CeleXA) 20 mg tablet TAKE 1 TABLET BY MOUTH EVERY DAY 90 tablet 3    Cranberry 125 MG TABS Take by mouth      cyclobenzaprine (FLEXERIL) 10 mg tablet Take 1 tablet (10 mg total) by mouth daily at bedtime 30 tablet 0    furosemide (LASIX) 40 mg tablet Take by mouth      glucose blood (FREESTYLE LITE) test strip by In Vitro route      Januvia 100 MG tablet TAKE 1 TABLET BY MOUTH EVERY DAY 90 tablet 1    metFORMIN (GLUCOPHAGE) 1000 MG tablet TAKE 1 TABLET BY MOUTH TWICE A  tablet 1    Multiple Vitamin (MULTIVITAMINS PO) Take 1 capsule by mouth daily      ondansetron (ZOFRAN-ODT) 4 mg disintegrating tablet Take 1 tablet (4 mg total) by mouth every 6 (six) hours as needed for nausea or vomiting 20 tablet 0    Promethazine-DM (PHENERGAN-DM) 6 25-15 mg/5 mL oral syrup Take 2 5 mL by mouth 4 (four) times a day as needed for cough 118 mL 0    simvastatin (ZOCOR) 40 mg tablet TAKE 1 TABLET BY MOUTH EVERY DAY 90 tablet 3     No current facility-administered medications for this visit  Allergies   Allergen Reactions    Penicillins Anaphylaxis      Immunizations:     Immunization History   Administered Date(s) Administered    Influenza Split High Dose Preservative Free IM 09/19/2014, 10/20/2015, 09/26/2016, 09/13/2017    Influenza, high dose seasonal 0 7 mL 12/11/2018, 11/09/2020    Influenza, seasonal, injectable 1944, 11/23/2012    Pneumococcal Conjugate 13-Valent 06/13/2019    Pneumococcal Polysaccharide PPV23 01/23/2017      Health Maintenance: There are no preventive care reminders to display for this patient        Topic Date Due    COVID-19 Vaccine (1) Never done    DTaP,Tdap,and Td Vaccines (1 - Tdap) Never done      Medicare Health Risk Assessment:     /78 (BP Location: Left arm, Patient Position: Sitting)   Pulse 92   Temp 98 5 °F (36 9 °C) (Temporal)  5' 4" (1 626 m) Comment: on file  Wt 97 1 kg (214 lb)   BMI 36 73 kg/m²      Nubia Pugh is here for her Initial Wellness visit  Health Risk Assessment:   Patient rates overall health as fair  Patient feels that their physical health rating is same  Patient is satisfied with their life  Eyesight was rated as same  Hearing was rated as slightly worse  Patient feels that their emotional and mental health rating is same  Patients states they are never, rarely angry  Patient states they are never, rarely unusually tired/fatigued  Pain experienced in the last 7 days has been none  Patient states that she has experienced no weight loss or gain in last 6 months  Depression Screening:   PHQ-2 Score: 3  PHQ-9 Score: 11      Fall Risk Screening: In the past year, patient has experienced: history of falling in past year    Number of falls: 1  Injured during fall?: No    Feels unsteady when standing or walking?: Yes    Worried about falling?: Yes      Urinary Incontinence Screening:   Patient has leaked urine accidently in the last six months  Home Safety:  Patient does not have trouble with stairs inside or outside of their home  Patient has working smoke alarms and has no working carbon monoxide detector  Home safety hazards include: none  Nutrition:   Current diet is Regular  Medications:   Patient is not currently taking any over-the-counter supplements  Patient is not able to manage medications  Activities of Daily Living (ADLs)/Instrumental Activities of Daily Living (IADLs):   Walk and transfer into and out of bed and chair?: Yes  Dress and groom yourself?: Yes    Bathe or shower yourself?: Yes    Feed yourself?  Yes  Do your laundry/housekeeping?: Yes  Manage your money, pay your bills and track your expenses?: Yes  Make your own meals?: Yes    Do your own shopping?: Yes    Advance Care Planning:   Living will: Yes    Advanced directive: Yes      Cognitive Screening:   Provider or family/friend/caregiver concerned regarding cognition?: No    PREVENTIVE SCREENINGS      Cardiovascular Screening:    General: Screening Not Indicated and History Lipid Disorder      Diabetes Screening:     General: Screening Not Indicated and History Diabetes      Colorectal Cancer Screening:     General: Screening Current      Breast Cancer Screening:     General: Risks and Benefits Discussed      Cervical Cancer Screening:    General: Screening Not Indicated      Osteoporosis Screening:    General: Risks and Benefits Discussed      Abdominal Aortic Aneurysm (AAA) Screening:        General: Screening Not Indicated      Lung Cancer Screening:     General: Screening Not Indicated      Hepatitis C Screening:    General: Patient Declines    Screening, Brief Intervention, and Referral to Treatment (SBIRT)    Screening      Single Item Drug Screening:  How often have you used an illegal drug (including marijuana) or a prescription medication for non-medical reasons in the past year? never    Single Item Drug Screen Score: 0  Interpretation: Negative screen for possible drug use disorder    Review of Current Opioid Use    Opioid Risk Tool (ORT) Interpretation: Complete Opioid Risk Tool (ORT)      Belen Briggs PA-C

## 2021-07-08 DIAGNOSIS — I10 BENIGN ESSENTIAL HYPERTENSION: ICD-10-CM

## 2021-07-08 RX ORDER — AMLODIPINE BESYLATE 5 MG/1
TABLET ORAL
Qty: 90 TABLET | Refills: 0 | Status: SHIPPED | OUTPATIENT
Start: 2021-07-08 | End: 2021-08-17 | Stop reason: SDUPTHER

## 2021-08-16 ENCOUNTER — TELEPHONE (OUTPATIENT)
Dept: FAMILY MEDICINE CLINIC | Facility: CLINIC | Age: 76
End: 2021-08-16

## 2021-08-16 DIAGNOSIS — I10 BENIGN ESSENTIAL HYPERTENSION: ICD-10-CM

## 2021-08-16 DIAGNOSIS — F32.9 REACTIVE DEPRESSION: ICD-10-CM

## 2021-08-16 DIAGNOSIS — U07.1 COVID-19 VIRUS INFECTION: ICD-10-CM

## 2021-08-16 DIAGNOSIS — E11.9 TYPE 2 DIABETES MELLITUS WITHOUT COMPLICATION, WITHOUT LONG-TERM CURRENT USE OF INSULIN (HCC): ICD-10-CM

## 2021-08-16 DIAGNOSIS — I10 HYPERTENSION, UNSPECIFIED TYPE: ICD-10-CM
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
## 2021-08-17 ENCOUNTER — OFFICE VISIT (OUTPATIENT)
Dept: FAMILY MEDICINE CLINIC | Facility: CLINIC | Age: 76
End: 2021-08-17
Payer: COMMERCIAL

## 2021-08-17 VITALS
BODY MASS INDEX: 35.68 KG/M2 | SYSTOLIC BLOOD PRESSURE: 138 MMHG | DIASTOLIC BLOOD PRESSURE: 80 MMHG | HEART RATE: 84 BPM | WEIGHT: 209 LBS | RESPIRATION RATE: 18 BRPM | HEIGHT: 64 IN

## 2021-08-17 DIAGNOSIS — E78.2 MIXED HYPERLIPIDEMIA: ICD-10-CM

## 2021-08-17 DIAGNOSIS — E11.9 TYPE 2 DIABETES MELLITUS WITHOUT COMPLICATION, WITHOUT LONG-TERM CURRENT USE OF INSULIN (HCC): ICD-10-CM

## 2021-08-17 DIAGNOSIS — F32.9 REACTIVE DEPRESSION: ICD-10-CM

## 2021-08-17 DIAGNOSIS — F43.21 GRIEF: ICD-10-CM

## 2021-08-17 DIAGNOSIS — I10 ESSENTIAL HYPERTENSION: Primary | ICD-10-CM

## 2021-08-17 PROCEDURE — 1036F TOBACCO NON-USER: CPT | Performed by: NURSE PRACTITIONER

## 2021-08-17 PROCEDURE — 3079F DIAST BP 80-89 MM HG: CPT | Performed by: NURSE PRACTITIONER

## 2021-08-17 PROCEDURE — 99214 OFFICE O/P EST MOD 30 MIN: CPT | Performed by: NURSE PRACTITIONER

## 2021-08-17 PROCEDURE — 3075F SYST BP GE 130 - 139MM HG: CPT | Performed by: NURSE PRACTITIONER

## 2021-08-17 PROCEDURE — 1160F RVW MEDS BY RX/DR IN RCRD: CPT | Performed by: NURSE PRACTITIONER

## 2021-08-17 RX ORDER — SIMVASTATIN 40 MG
40 TABLET ORAL DAILY
Qty: 90 TABLET | Refills: 3 | Status: SHIPPED | OUTPATIENT
Start: 2021-08-17 | End: 2022-08-09

## 2021-08-17 RX ORDER — BLOOD-GLUCOSE METER
KIT MISCELLANEOUS DAILY
Qty: 1 KIT | Refills: 0 | Status: SHIPPED | OUTPATIENT
Start: 2021-08-17

## 2021-08-17 RX ORDER — ATENOLOL 25 MG/1
25 TABLET ORAL
Qty: 90 TABLET | Refills: 3 | Status: CANCELLED | OUTPATIENT
Start: 2021-08-17

## 2021-08-17 RX ORDER — AMLODIPINE BESYLATE 5 MG/1
5 TABLET ORAL DAILY
Qty: 90 TABLET | Refills: 0 | Status: SHIPPED | OUTPATIENT
Start: 2021-08-17 | End: 2021-11-08

## 2021-08-17 RX ORDER — ATENOLOL 25 MG/1
25 TABLET ORAL
Qty: 90 TABLET | Refills: 3 | Status: SHIPPED | OUTPATIENT
Start: 2021-08-17 | End: 2021-12-30

## 2021-08-17 RX ORDER — CITALOPRAM 20 MG/1
20 TABLET ORAL DAILY
Qty: 90 TABLET | Refills: 3 | Status: CANCELLED | OUTPATIENT
Start: 2021-08-17

## 2021-08-17 RX ORDER — CITALOPRAM 20 MG/1
20 TABLET ORAL DAILY
Qty: 90 TABLET | Refills: 3 | Status: SHIPPED | OUTPATIENT
Start: 2021-08-17 | End: 2022-08-09

## 2021-08-17 RX ORDER — DEXTROMETHORPHAN HYDROBROMIDE AND PROMETHAZINE HYDROCHLORIDE 15; 6.25 MG/5ML; MG/5ML
2.5 SOLUTION ORAL 4 TIMES DAILY PRN
Qty: 118 ML | Refills: 0 | Status: SHIPPED | OUTPATIENT
Start: 2021-08-17 | End: 2022-05-13 | Stop reason: ALTCHOICE

## 2021-08-17 RX ORDER — LORAZEPAM 0.5 MG/1
0.5 TABLET ORAL DAILY PRN
Qty: 10 TABLET | Refills: 0 | Status: SHIPPED | OUTPATIENT
Start: 2021-08-17

## 2021-08-17 RX ORDER — AMLODIPINE BESYLATE 5 MG/1
5 TABLET ORAL DAILY
Qty: 90 TABLET | Refills: 0 | Status: CANCELLED | OUTPATIENT
Start: 2021-08-17

## 2021-08-17 RX ORDER — BLOOD-GLUCOSE METER
1 KIT MISCELLANEOUS DAILY
Qty: 100 EACH | Refills: 1 | Status: SHIPPED | OUTPATIENT
Start: 2021-08-17
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
## 2021-08-17 NOTE — ASSESSMENT & PLAN NOTE
Lab Results   Component Value Date    HGBA1C 6 2 (H) 05/03/2021       [facility]  meds refilled  New meter sent in

## 2021-08-18 ENCOUNTER — PATIENT OUTREACH (OUTPATIENT)
Dept: FAMILY MEDICINE CLINIC | Facility: CLINIC | Age: 76
End: 2021-08-18
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
## 2021-09-08 ENCOUNTER — PATIENT OUTREACH (OUTPATIENT)
Dept: FAMILY MEDICINE CLINIC | Facility: CLINIC | Age: 76
End: 2021-09-08

## 2021-09-22 ENCOUNTER — PATIENT OUTREACH (OUTPATIENT)
Dept: FAMILY MEDICINE CLINIC | Facility: CLINIC | Age: 76
End: 2021-09-22

## 2021-10-25 LAB
LEFT EYE DIABETIC RETINOPATHY: NORMAL
RIGHT EYE DIABETIC RETINOPATHY: NORMAL
SEVERITY (EYE EXAM): NORMAL

## 2021-11-08 DIAGNOSIS — I10 ESSENTIAL HYPERTENSION: ICD-10-CM

## 2021-11-08 RX ORDER — AMLODIPINE BESYLATE 5 MG/1
TABLET ORAL
Qty: 90 TABLET | Refills: 0 | Status: SHIPPED | OUTPATIENT
Start: 2021-11-08 | End: 2022-02-07

## 2021-11-19 ENCOUNTER — RA CDI HCC (OUTPATIENT)
Dept: OTHER | Facility: HOSPITAL | Age: 76
End: 2021-11-19

## 2021-11-22 ENCOUNTER — APPOINTMENT (OUTPATIENT)
Dept: LAB | Facility: CLINIC | Age: 76
End: 2021-11-22
Payer: COMMERCIAL

## 2021-11-22 DIAGNOSIS — E11.9 TYPE 2 DIABETES MELLITUS WITHOUT COMPLICATION, WITHOUT LONG-TERM CURRENT USE OF INSULIN (HCC): ICD-10-CM

## 2021-11-22 DIAGNOSIS — Z00.00 HEALTH CARE MAINTENANCE: ICD-10-CM

## 2021-11-22 DIAGNOSIS — R80.9 MICROALBUMINURIA: ICD-10-CM

## 2021-11-22 DIAGNOSIS — E78.2 MIXED HYPERLIPIDEMIA: ICD-10-CM

## 2021-11-22 DIAGNOSIS — E66.01 SEVERE OBESITY (BMI 35.0-35.9 WITH COMORBIDITY) (HCC): ICD-10-CM

## 2021-11-22 LAB
ALBUMIN SERPL BCP-MCNC: 3.3 G/DL (ref 3.5–5)
ALP SERPL-CCNC: 60 U/L (ref 46–116)
ALT SERPL W P-5'-P-CCNC: 23 U/L (ref 12–78)
ANION GAP SERPL CALCULATED.3IONS-SCNC: 4 MMOL/L (ref 4–13)
AST SERPL W P-5'-P-CCNC: 14 U/L (ref 5–45)
BASOPHILS # BLD AUTO: 0.05 THOUSANDS/ΜL (ref 0–0.1)
BASOPHILS NFR BLD AUTO: 1 % (ref 0–1)
BILIRUB SERPL-MCNC: 0.35 MG/DL (ref 0.2–1)
BUN SERPL-MCNC: 22 MG/DL (ref 5–25)
CALCIUM ALBUM COR SERPL-MCNC: 9.7 MG/DL (ref 8.3–10.1)
CALCIUM SERPL-MCNC: 9.1 MG/DL (ref 8.3–10.1)
CHLORIDE SERPL-SCNC: 106 MMOL/L (ref 100–108)
CHOLEST SERPL-MCNC: 169 MG/DL
CO2 SERPL-SCNC: 30 MMOL/L (ref 21–32)
CREAT SERPL-MCNC: 0.63 MG/DL (ref 0.6–1.3)
CREAT UR-MCNC: 124 MG/DL
EOSINOPHIL # BLD AUTO: 0.15 THOUSAND/ΜL (ref 0–0.61)
EOSINOPHIL NFR BLD AUTO: 2 % (ref 0–6)
ERYTHROCYTE [DISTWIDTH] IN BLOOD BY AUTOMATED COUNT: 14.9 % (ref 11.6–15.1)
EST. AVERAGE GLUCOSE BLD GHB EST-MCNC: 131 MG/DL
GFR SERPL CREATININE-BSD FRML MDRD: 87 ML/MIN/1.73SQ M
GLUCOSE P FAST SERPL-MCNC: 99 MG/DL (ref 65–99)
HBA1C MFR BLD: 6.2 %
HCT VFR BLD AUTO: 39 % (ref 34.8–46.1)
HDLC SERPL-MCNC: 79 MG/DL
HGB BLD-MCNC: 12 G/DL (ref 11.5–15.4)
IMM GRANULOCYTES # BLD AUTO: 0.02 THOUSAND/UL (ref 0–0.2)
IMM GRANULOCYTES NFR BLD AUTO: 0 % (ref 0–2)
LDLC SERPL CALC-MCNC: 76 MG/DL (ref 0–100)
LYMPHOCYTES # BLD AUTO: 1.56 THOUSANDS/ΜL (ref 0.6–4.47)
LYMPHOCYTES NFR BLD AUTO: 25 % (ref 14–44)
MCH RBC QN AUTO: 30.2 PG (ref 26.8–34.3)
MCHC RBC AUTO-ENTMCNC: 30.8 G/DL (ref 31.4–37.4)
MCV RBC AUTO: 98 FL (ref 82–98)
MICROALBUMIN UR-MCNC: 51.8 MG/L (ref 0–20)
MICROALBUMIN/CREAT 24H UR: 42 MG/G CREATININE (ref 0–30)
MONOCYTES # BLD AUTO: 0.46 THOUSAND/ΜL (ref 0.17–1.22)
MONOCYTES NFR BLD AUTO: 7 % (ref 4–12)
NEUTROPHILS # BLD AUTO: 4.06 THOUSANDS/ΜL (ref 1.85–7.62)
NEUTS SEG NFR BLD AUTO: 65 % (ref 43–75)
NRBC BLD AUTO-RTO: 0 /100 WBCS
PLATELET # BLD AUTO: 352 THOUSANDS/UL (ref 149–390)
PMV BLD AUTO: 10.6 FL (ref 8.9–12.7)
POTASSIUM SERPL-SCNC: 4 MMOL/L (ref 3.5–5.3)
PROT SERPL-MCNC: 7.4 G/DL (ref 6.4–8.2)
RBC # BLD AUTO: 3.98 MILLION/UL (ref 3.81–5.12)
SODIUM SERPL-SCNC: 140 MMOL/L (ref 136–145)
TRIGL SERPL-MCNC: 72 MG/DL
TSH SERPL DL<=0.05 MIU/L-ACNC: 1.7 UIU/ML (ref 0.36–3.74)
WBC # BLD AUTO: 6.3 THOUSAND/UL (ref 4.31–10.16)

## 2021-11-22 PROCEDURE — 80061 LIPID PANEL: CPT

## 2021-11-22 PROCEDURE — 82043 UR ALBUMIN QUANTITATIVE: CPT

## 2021-11-22 PROCEDURE — 83036 HEMOGLOBIN GLYCOSYLATED A1C: CPT

## 2021-11-22 PROCEDURE — 85025 COMPLETE CBC W/AUTO DIFF WBC: CPT

## 2021-11-22 PROCEDURE — 84443 ASSAY THYROID STIM HORMONE: CPT

## 2021-11-22 PROCEDURE — 82570 ASSAY OF URINE CREATININE: CPT

## 2021-11-22 PROCEDURE — 36415 COLL VENOUS BLD VENIPUNCTURE: CPT

## 2021-11-22 PROCEDURE — 80053 COMPREHEN METABOLIC PANEL: CPT

## 2021-12-02 ENCOUNTER — OFFICE VISIT (OUTPATIENT)
Dept: FAMILY MEDICINE CLINIC | Facility: CLINIC | Age: 76
End: 2021-12-02
Payer: COMMERCIAL

## 2021-12-02 VITALS
SYSTOLIC BLOOD PRESSURE: 140 MMHG | TEMPERATURE: 97.1 F | HEART RATE: 74 BPM | DIASTOLIC BLOOD PRESSURE: 82 MMHG | WEIGHT: 214 LBS | HEIGHT: 64 IN | BODY MASS INDEX: 36.54 KG/M2

## 2021-12-02 DIAGNOSIS — Z23 NEED FOR INFLUENZA VACCINATION: ICD-10-CM

## 2021-12-02 DIAGNOSIS — E66.01 SEVERE OBESITY (BMI 35.0-35.9 WITH COMORBIDITY) (HCC): ICD-10-CM

## 2021-12-02 DIAGNOSIS — I10 ESSENTIAL HYPERTENSION: ICD-10-CM

## 2021-12-02 DIAGNOSIS — E11.9 TYPE 2 DIABETES MELLITUS WITHOUT COMPLICATION, WITHOUT LONG-TERM CURRENT USE OF INSULIN (HCC): Primary | ICD-10-CM

## 2021-12-02 DIAGNOSIS — M25.551 RIGHT HIP PAIN: ICD-10-CM

## 2021-12-02 DIAGNOSIS — E78.2 MIXED HYPERLIPIDEMIA: ICD-10-CM

## 2021-12-02 PROCEDURE — 1160F RVW MEDS BY RX/DR IN RCRD: CPT | Performed by: PHYSICIAN ASSISTANT

## 2021-12-02 PROCEDURE — 90662 IIV NO PRSV INCREASED AG IM: CPT | Performed by: PHYSICIAN ASSISTANT

## 2021-12-02 PROCEDURE — 3079F DIAST BP 80-89 MM HG: CPT | Performed by: PHYSICIAN ASSISTANT

## 2021-12-02 PROCEDURE — G0008 ADMIN INFLUENZA VIRUS VAC: HCPCS | Performed by: PHYSICIAN ASSISTANT

## 2021-12-02 PROCEDURE — 1036F TOBACCO NON-USER: CPT | Performed by: PHYSICIAN ASSISTANT

## 2021-12-02 PROCEDURE — 3077F SYST BP >= 140 MM HG: CPT | Performed by: PHYSICIAN ASSISTANT

## 2021-12-02 PROCEDURE — 99214 OFFICE O/P EST MOD 30 MIN: CPT | Performed by: PHYSICIAN ASSISTANT

## 2021-12-02 RX ORDER — CLINDAMYCIN HYDROCHLORIDE 150 MG/1
CAPSULE ORAL
COMMUNITY
Start: 2021-10-20 | End: 2021-12-02

## 2022-02-04 DIAGNOSIS — I10 ESSENTIAL HYPERTENSION: ICD-10-CM

## 2022-02-07 DIAGNOSIS — E11.9 TYPE 2 DIABETES MELLITUS WITHOUT COMPLICATION, WITHOUT LONG-TERM CURRENT USE OF INSULIN (HCC): ICD-10-CM

## 2022-02-07 RX ORDER — AMLODIPINE BESYLATE 5 MG/1
TABLET ORAL
Qty: 90 TABLET | Refills: 3 | Status: SHIPPED | OUTPATIENT
Start: 2022-02-07

## 2022-02-07 RX ORDER — SITAGLIPTIN 100 MG/1
TABLET, FILM COATED ORAL
Qty: 90 TABLET | Refills: 1 | Status: SHIPPED | OUTPATIENT
Start: 2022-02-07 | End: 2022-08-09

## 2022-02-14 DIAGNOSIS — E11.9 TYPE 2 DIABETES MELLITUS WITHOUT COMPLICATION, WITHOUT LONG-TERM CURRENT USE OF INSULIN (HCC): ICD-10-CM

## 2022-05-13 ENCOUNTER — OFFICE VISIT (OUTPATIENT)
Dept: FAMILY MEDICINE CLINIC | Facility: CLINIC | Age: 77
End: 2022-05-13
Payer: COMMERCIAL

## 2022-05-13 VITALS
HEART RATE: 66 BPM | DIASTOLIC BLOOD PRESSURE: 68 MMHG | BODY MASS INDEX: 37.39 KG/M2 | WEIGHT: 219 LBS | SYSTOLIC BLOOD PRESSURE: 130 MMHG | HEIGHT: 64 IN

## 2022-05-13 DIAGNOSIS — E66.01 SEVERE OBESITY (BMI 35.0-35.9 WITH COMORBIDITY) (HCC): ICD-10-CM

## 2022-05-13 DIAGNOSIS — I10 ESSENTIAL HYPERTENSION: ICD-10-CM

## 2022-05-13 DIAGNOSIS — E11.9 TYPE 2 DIABETES MELLITUS WITHOUT COMPLICATION, WITHOUT LONG-TERM CURRENT USE OF INSULIN (HCC): ICD-10-CM

## 2022-05-13 DIAGNOSIS — E78.2 MIXED HYPERLIPIDEMIA: ICD-10-CM

## 2022-05-13 DIAGNOSIS — M25.562 ACUTE PAIN OF LEFT KNEE: ICD-10-CM

## 2022-05-13 DIAGNOSIS — S72.90XD CLOSED FRACTURE OF FEMUR WITH ROUTINE HEALING, UNSPECIFIED FRACTURE MORPHOLOGY, UNSPECIFIED LATERALITY, UNSPECIFIED PORTION OF FEMUR, SUBSEQUENT ENCOUNTER: Primary | ICD-10-CM

## 2022-05-13 DIAGNOSIS — E55.9 VITAMIN D DEFICIENCY: ICD-10-CM

## 2022-05-13 PROCEDURE — 1036F TOBACCO NON-USER: CPT | Performed by: PHYSICIAN ASSISTANT

## 2022-05-13 PROCEDURE — 99214 OFFICE O/P EST MOD 30 MIN: CPT | Performed by: PHYSICIAN ASSISTANT

## 2022-05-13 PROCEDURE — 3078F DIAST BP <80 MM HG: CPT | Performed by: PHYSICIAN ASSISTANT

## 2022-05-13 PROCEDURE — 1160F RVW MEDS BY RX/DR IN RCRD: CPT | Performed by: PHYSICIAN ASSISTANT

## 2022-05-13 PROCEDURE — 3075F SYST BP GE 130 - 139MM HG: CPT | Performed by: PHYSICIAN ASSISTANT

## 2022-05-13 RX ORDER — METHOCARBAMOL 500 MG/1
500 TABLET, FILM COATED ORAL 4 TIMES DAILY
COMMUNITY

## 2022-05-13 RX ORDER — ENOXAPARIN SODIUM 100 MG/ML
0.4 INJECTION SUBCUTANEOUS EVERY 24 HOURS
COMMUNITY
Start: 2022-04-28 | End: 2022-05-13 | Stop reason: ALTCHOICE

## 2022-05-13 RX ORDER — TEA TREE OIL 100 %
15 SPRAY, NON-AEROSOL (ML) TOPICAL
COMMUNITY
Start: 2022-04-27

## 2022-05-13 RX ORDER — AMOXICILLIN 250 MG
2 CAPSULE ORAL 2 TIMES DAILY
COMMUNITY
Start: 2022-04-27 | End: 2022-05-13 | Stop reason: ALTCHOICE

## 2022-05-13 RX ORDER — ENOXAPARIN SODIUM 100 MG/ML
40 INJECTION SUBCUTANEOUS DAILY
COMMUNITY
Start: 2022-04-28 | End: 2022-05-13 | Stop reason: ALTCHOICE

## 2022-05-13 RX ORDER — CELECOXIB 200 MG/1
200 CAPSULE ORAL DAILY
Qty: 30 CAPSULE | Refills: 0 | Status: SHIPPED | OUTPATIENT
Start: 2022-05-13 | End: 2022-06-13

## 2022-05-13 RX ORDER — FUROSEMIDE 40 MG/1
40 TABLET ORAL 2 TIMES DAILY
COMMUNITY

## 2022-05-13 RX ORDER — FERROUS SULFATE 325(65) MG
325 TABLET ORAL
COMMUNITY
Start: 2022-04-28 | End: 2023-04-28

## 2022-05-13 NOTE — PATIENT INSTRUCTIONS
Assessment/plan:   Closed femur fracture -status post open reduction internal fixation, follows with orthopedic specialist   Recently [facility]  Patient seems to be getting around the home okay  Would recommend continue home services  She was referred to PT/ OT / nursing through [facility]  2  Type 2 diabetes  -Stable with hemoglobin A1c of 6 5  Continue metformin therapy no medication changes  3   Hypertension  -Stable, no medication changes  4   Hyperlipidemia  -Stable, no medication changes  5   Vitamin-D deficiency -recommend 5000 units of vitamin-D 3 daily  Reassess with labs in July 6   Knee pain of the left side-this was exacerbated by the fall  She has not had any x-ray and I would recommend obtaining films as she seems very uncomfortable with it  We will try Celebrex 200 mg daily as well as Voltaren gel applied directly to the area as necessary
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
## 2022-05-25 ENCOUNTER — TELEPHONE (OUTPATIENT)
Dept: FAMILY MEDICINE CLINIC | Facility: CLINIC | Age: 77
End: 2022-05-25

## 2022-05-25 NOTE — TELEPHONE ENCOUNTER
CONNIE CALLED FROM [address] TO MAKE PROVIDER AWARE PT WAS EXPOSED TO COVID 05/19/22, PT REFUSED BEING TESTED AND IS HAVING NO SYMPTOMS, IF ANY QUESTIONS CALL CONNIE [phone]

## 2022-06-11 DIAGNOSIS — M25.562 ACUTE PAIN OF LEFT KNEE: ICD-10-CM

## 2022-06-13 RX ORDER — CELECOXIB 200 MG/1
200 CAPSULE ORAL DAILY
Qty: 30 CAPSULE | Refills: 0 | Status: SHIPPED | OUTPATIENT
Start: 2022-06-13

## 2022-06-14 ENCOUNTER — TELEPHONE (OUTPATIENT)
Dept: FAMILY MEDICINE CLINIC | Facility: CLINIC | Age: 77
End: 2022-06-14

## 2022-06-14 NOTE — TELEPHONE ENCOUNTER
Leoncio BECERRA Case ID: [id number] - Rx #: [id number] Need help? Call us at [phone]    Status Sent to Plan today    Drug Celecoxib 200MG capsules   Form [employer] Electronic PA Form (2017 NCPDP)      Original Claim Info    27,075     Drug Not Covered  Contact [employer] for a 1 time override if patient has been discharged from hospital or LTC, etc  OTHERWISE, contact prescriber to submit Drug Review Request or obtain a new RX  [facility] Preferred Product  s    IBUPROFEN [id number]   MELOXICAM [id number]

## 2022-06-24 ENCOUNTER — APPOINTMENT (OUTPATIENT)
Dept: LAB | Facility: CLINIC | Age: 77
End: 2022-06-24
Payer: COMMERCIAL

## 2022-06-24 DIAGNOSIS — I10 ESSENTIAL HYPERTENSION: ICD-10-CM

## 2022-06-24 DIAGNOSIS — E11.9 TYPE 2 DIABETES MELLITUS WITHOUT COMPLICATION, WITHOUT LONG-TERM CURRENT USE OF INSULIN (HCC): ICD-10-CM

## 2022-06-24 DIAGNOSIS — E66.01 SEVERE OBESITY (BMI 35.0-35.9 WITH COMORBIDITY) (HCC): ICD-10-CM

## 2022-06-24 DIAGNOSIS — E78.2 MIXED HYPERLIPIDEMIA: ICD-10-CM

## 2022-06-24 LAB
ALBUMIN SERPL BCP-MCNC: 3.8 G/DL (ref 3.5–5)
ALP SERPL-CCNC: 100 U/L (ref 46–116)
ALT SERPL W P-5'-P-CCNC: 19 U/L (ref 12–78)
ANION GAP SERPL CALCULATED.3IONS-SCNC: 2 MMOL/L (ref 4–13)
AST SERPL W P-5'-P-CCNC: 15 U/L (ref 5–45)
BASOPHILS # BLD AUTO: 0.07 THOUSANDS/ΜL (ref 0–0.1)
BASOPHILS NFR BLD AUTO: 1 % (ref 0–1)
BILIRUB SERPL-MCNC: 0.39 MG/DL (ref 0.2–1)
BUN SERPL-MCNC: 14 MG/DL (ref 5–25)
CALCIUM SERPL-MCNC: 9.7 MG/DL (ref 8.3–10.1)
CHLORIDE SERPL-SCNC: 104 MMOL/L (ref 100–108)
CHOLEST SERPL-MCNC: 161 MG/DL
CO2 SERPL-SCNC: 32 MMOL/L (ref 21–32)
CREAT SERPL-MCNC: 0.65 MG/DL (ref 0.6–1.3)
CREAT UR-MCNC: 91.2 MG/DL
EOSINOPHIL # BLD AUTO: 0.3 THOUSAND/ΜL (ref 0–0.61)
EOSINOPHIL NFR BLD AUTO: 4 % (ref 0–6)
ERYTHROCYTE [DISTWIDTH] IN BLOOD BY AUTOMATED COUNT: 14.6 % (ref 11.6–15.1)
EST. AVERAGE GLUCOSE BLD GHB EST-MCNC: 117 MG/DL
GFR SERPL CREATININE-BSD FRML MDRD: 85 ML/MIN/1.73SQ M
GLUCOSE P FAST SERPL-MCNC: 97 MG/DL (ref 65–99)
HBA1C MFR BLD: 5.7 %
HCT VFR BLD AUTO: 36.8 % (ref 34.8–46.1)
HDLC SERPL-MCNC: 65 MG/DL
HGB BLD-MCNC: 11.5 G/DL (ref 11.5–15.4)
IMM GRANULOCYTES # BLD AUTO: 0.01 THOUSAND/UL (ref 0–0.2)
IMM GRANULOCYTES NFR BLD AUTO: 0 % (ref 0–2)
LDLC SERPL CALC-MCNC: 69 MG/DL (ref 0–100)
LYMPHOCYTES # BLD AUTO: 2.16 THOUSANDS/ΜL (ref 0.6–4.47)
LYMPHOCYTES NFR BLD AUTO: 31 % (ref 14–44)
MCH RBC QN AUTO: 29.7 PG (ref 26.8–34.3)
MCHC RBC AUTO-ENTMCNC: 31.3 G/DL (ref 31.4–37.4)
MCV RBC AUTO: 95 FL (ref 82–98)
MICROALBUMIN UR-MCNC: 16.2 MG/L (ref 0–20)
MICROALBUMIN/CREAT 24H UR: 18 MG/G CREATININE (ref 0–30)
MONOCYTES # BLD AUTO: 0.6 THOUSAND/ΜL (ref 0.17–1.22)
MONOCYTES NFR BLD AUTO: 9 % (ref 4–12)
NEUTROPHILS # BLD AUTO: 3.89 THOUSANDS/ΜL (ref 1.85–7.62)
NEUTS SEG NFR BLD AUTO: 55 % (ref 43–75)
NRBC BLD AUTO-RTO: 0 /100 WBCS
PLATELET # BLD AUTO: 344 THOUSANDS/UL (ref 149–390)
PMV BLD AUTO: 10.4 FL (ref 8.9–12.7)
POTASSIUM SERPL-SCNC: 4.2 MMOL/L (ref 3.5–5.3)
PROT SERPL-MCNC: 7.8 G/DL (ref 6.4–8.2)
RBC # BLD AUTO: 3.87 MILLION/UL (ref 3.81–5.12)
SODIUM SERPL-SCNC: 138 MMOL/L (ref 136–145)
TRIGL SERPL-MCNC: 134 MG/DL
TSH SERPL DL<=0.05 MIU/L-ACNC: 7.56 UIU/ML (ref 0.45–4.5)
WBC # BLD AUTO: 7.03 THOUSAND/UL (ref 4.31–10.16)

## 2022-06-24 PROCEDURE — 83036 HEMOGLOBIN GLYCOSYLATED A1C: CPT

## 2022-06-24 PROCEDURE — 84443 ASSAY THYROID STIM HORMONE: CPT

## 2022-06-24 PROCEDURE — 82043 UR ALBUMIN QUANTITATIVE: CPT

## 2022-06-24 PROCEDURE — 36415 COLL VENOUS BLD VENIPUNCTURE: CPT

## 2022-06-24 PROCEDURE — 85025 COMPLETE CBC W/AUTO DIFF WBC: CPT

## 2022-06-24 PROCEDURE — 80061 LIPID PANEL: CPT

## 2022-06-24 PROCEDURE — 80053 COMPREHEN METABOLIC PANEL: CPT

## 2022-06-24 PROCEDURE — 82570 ASSAY OF URINE CREATININE: CPT

## 2022-08-09 DIAGNOSIS — E11.9 TYPE 2 DIABETES MELLITUS WITHOUT COMPLICATION, WITHOUT LONG-TERM CURRENT USE OF INSULIN (HCC): ICD-10-CM

## 2022-08-09 DIAGNOSIS — F32.9 REACTIVE DEPRESSION: ICD-10-CM

## 2022-08-09 DIAGNOSIS — E78.2 MIXED HYPERLIPIDEMIA: ICD-10-CM

## 2022-08-09 RX ORDER — CITALOPRAM 20 MG/1
TABLET ORAL
Qty: 90 TABLET | Refills: 1 | Status: SHIPPED | OUTPATIENT
Start: 2022-08-09

## 2022-08-09 RX ORDER — SIMVASTATIN 40 MG
TABLET ORAL
Qty: 90 TABLET | Refills: 1 | Status: SHIPPED | OUTPATIENT
Start: 2022-08-09

## 2022-08-09 RX ORDER — SITAGLIPTIN 100 MG/1
TABLET, FILM COATED ORAL
Qty: 90 TABLET | Refills: 1 | Status: SHIPPED | OUTPATIENT
Start: 2022-08-09

## 2022-08-22 DIAGNOSIS — I10 ESSENTIAL HYPERTENSION: ICD-10-CM

## 2022-08-22 DIAGNOSIS — E11.9 TYPE 2 DIABETES MELLITUS WITHOUT COMPLICATION, WITHOUT LONG-TERM CURRENT USE OF INSULIN (HCC): ICD-10-CM

## 2022-08-22 RX ORDER — ATENOLOL 25 MG/1
25 TABLET ORAL
Qty: 90 TABLET | Refills: 1 | Status: SHIPPED | OUTPATIENT
Start: 2022-08-22

## 2022-09-22 DIAGNOSIS — I10 ESSENTIAL HYPERTENSION: ICD-10-CM

## 2022-09-22 RX ORDER — AMLODIPINE BESYLATE 5 MG/1
TABLET ORAL
Qty: 90 TABLET | Refills: 1 | Status: SHIPPED | OUTPATIENT
Start: 2022-09-22

## 2022-10-03 ENCOUNTER — OFFICE VISIT (OUTPATIENT)
Dept: FAMILY MEDICINE CLINIC | Facility: CLINIC | Age: 77
End: 2022-10-03
Payer: COMMERCIAL

## 2022-10-03 VITALS
SYSTOLIC BLOOD PRESSURE: 142 MMHG | BODY MASS INDEX: 38.58 KG/M2 | HEIGHT: 64 IN | WEIGHT: 226 LBS | DIASTOLIC BLOOD PRESSURE: 68 MMHG | HEART RATE: 72 BPM

## 2022-10-03 DIAGNOSIS — S72.90XD CLOSED FRACTURE OF FEMUR WITH ROUTINE HEALING, UNSPECIFIED FRACTURE MORPHOLOGY, UNSPECIFIED LATERALITY, UNSPECIFIED PORTION OF FEMUR, SUBSEQUENT ENCOUNTER: ICD-10-CM

## 2022-10-03 DIAGNOSIS — E11.9 TYPE 2 DIABETES MELLITUS WITHOUT COMPLICATION, WITHOUT LONG-TERM CURRENT USE OF INSULIN (HCC): ICD-10-CM

## 2022-10-03 DIAGNOSIS — R80.9 MICROALBUMINURIA: ICD-10-CM

## 2022-10-03 DIAGNOSIS — Z23 NEED FOR INFLUENZA VACCINATION: Primary | ICD-10-CM

## 2022-10-03 DIAGNOSIS — E66.01 SEVERE OBESITY (BMI 35.0-35.9 WITH COMORBIDITY) (HCC): ICD-10-CM

## 2022-10-03 DIAGNOSIS — E03.9 HYPOTHYROIDISM, UNSPECIFIED TYPE: ICD-10-CM

## 2022-10-03 DIAGNOSIS — E78.2 MIXED HYPERLIPIDEMIA: ICD-10-CM

## 2022-10-03 DIAGNOSIS — I10 ESSENTIAL HYPERTENSION: ICD-10-CM

## 2022-10-03 DIAGNOSIS — Z00.00 HEALTH CARE MAINTENANCE: ICD-10-CM

## 2022-10-03 PROCEDURE — 90662 IIV NO PRSV INCREASED AG IM: CPT | Performed by: PHYSICIAN ASSISTANT

## 2022-10-03 PROCEDURE — G0439 PPPS, SUBSEQ VISIT: HCPCS | Performed by: PHYSICIAN ASSISTANT

## 2022-10-03 PROCEDURE — G0008 ADMIN INFLUENZA VIRUS VAC: HCPCS | Performed by: PHYSICIAN ASSISTANT

## 2022-10-03 PROCEDURE — 99214 OFFICE O/P EST MOD 30 MIN: CPT | Performed by: PHYSICIAN ASSISTANT

## 2022-10-03 RX ORDER — LEVOTHYROXINE SODIUM 0.07 MG/1
75 TABLET ORAL DAILY
Qty: 90 TABLET | Refills: 1 | Status: SHIPPED | OUTPATIENT
Start: 2022-10-03

## 2022-10-03 NOTE — PATIENT INSTRUCTIONS
Assessment/plan:  Type 2 diabetes  -Stable with hemoglobin A1c of 5 7 on metformin and Januvia therapy  Hypothyroidism -new onset with TSH of  7 5  Will start levothyroxine 75 mcg daily and reassess lab value in the next 6 months  Patient to follow-up sooner if symptoms persist     Benign essential hypertension-presently stable on atenolol and amlodipine, no medication changes  Mixed hyperlipidemia - stable with simvastatin 40 mg daily  Micro albuminuria  -Level currently in normal range with good glucose control  Continue to monitor  Consider addition of Ace inhibitor  Severe obesity  -Continue healthy diet and exercise efforts  Healthcare maintenance-annual Medicare wellness visit completed  Patient agreeable to dexa scan  Encourage regular mammographies and colon cancer screening  Flu vaccine given today
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
## 2022-10-04 ENCOUNTER — TELEPHONE (OUTPATIENT)
Dept: ADMINISTRATIVE | Facility: OTHER | Age: 77
End: 2022-10-04

## 2022-10-04 NOTE — LETTER
Diabetic Eye Exam Form    Date Requested: 10/25/22  Patient: Quinn Ureña  Patient : 1945   Referring Provider: Latasha Bang PA-C    DIABETIC Eye Exam Date _______________________________    Type of Exam MUST be documented for Diabetic Eye Exams  Please CHECK ONE  Retinal Exam       Dilated Retinal Exam       OCT       Optomap-Iris Exam      Fundus Photography     Left Eye - Please check Retinopathy AND Type or No Retinopathy      Exam did show retinopathy    Exam did not show retinopathy         Mild     Proliferative           Moderate    Severe            None         Right Eye - Please check Retinopathy AND Type or No Retinopathy     Exam did show retinopathy    Exam did not show retinopathy         Mild     Proliferative        Moderate    Severe        None       Comments __________________________________________________________    Practice Providing Exam ______________________________________________    Exam Performed By (print name) _______________________________________      Provider Signature ___________________________________________________    These reports are needed for  compliance  Please fax this completed form and a copy of the Diabetic Eye Exam report to our office located at [place] [address] as soon as possible Fax via [phone] gerri Joseph: Phone [phone]  We thank you for your assistance in treating our mutual patient

## 2022-10-04 NOTE — TELEPHONE ENCOUNTER
Upon review of the In Basket request and the patient's chart, initial outreach has been made via fax, please see Contacts section for details        Att x1 foot   Att x1 eye    Thank you  Aurora Mccloud

## 2022-10-04 NOTE — LETTER
Diabetic Eye Exam Form    Date Requested: 10/17/22  Patient: Mandeep Whalen  Patient : 1945   Referring Provider: Zulema Diaz PA-C    DIABETIC Eye Exam Date _______________________________    Type of Exam MUST be documented for Diabetic Eye Exams  Please CHECK ONE  Retinal Exam       Dilated Retinal Exam       OCT       Optomap-Iris Exam      Fundus Photography     Left Eye - Please check Retinopathy AND Type or No Retinopathy      Exam did show retinopathy    Exam did not show retinopathy         Mild     Proliferative           Moderate    Severe            None         Right Eye - Please check Retinopathy AND Type or No Retinopathy     Exam did show retinopathy    Exam did not show retinopathy         Mild     Proliferative        Moderate    Severe        None       Comments __________________________________________________________    Practice Providing Exam ______________________________________________    Exam Performed By (print name) _______________________________________      Provider Signature ___________________________________________________    These reports are needed for  compliance  Please fax this completed form and a copy of the Diabetic Eye Exam report to our office located at [place] [address] as soon as possible FAX via [phone] gerri Carreon: Phone [phone]  We thank you for your assistance in treating our mutual patient

## 2022-10-04 NOTE — LETTER
Diabetic Eye Exam Form    Date Requested: 10/04/22  Patient: Nilda Price  Patient : 1945   Referring Provider: Fannie Killian PA-C    DIABETIC Eye Exam Date _______________________________    Type of Exam MUST be documented for Diabetic Eye Exams  Please CHECK ONE  Retinal Exam       Dilated Retinal Exam       OCT       Optomap-Iris Exam      Fundus Photography     Left Eye - Please check Retinopathy AND Type or No Retinopathy      Exam did show retinopathy    Exam did not show retinopathy         Mild     Proliferative           Moderate    Severe            None         Right Eye - Please check Retinopathy AND Type or No Retinopathy     Exam did show retinopathy    Exam did not show retinopathy         Mild     Proliferative        Moderate    Severe        None       Comments __2021________________________________________________________    Practice Providing Exam ______________________________________________    Exam Performed By (print name) _______________________________________      Provider Signature ___________________________________________________    These reports are needed for  compliance  Please fax this completed form and a copy of the Diabetic Eye Exam report to our office located at [place] [address] as soon as possible via [phone] gerri Ferreira: Phone [phone]  We thank you for your assistance in treating our mutual patient

## 2022-10-04 NOTE — TELEPHONE ENCOUNTER
----- Message from Nasir Alcantara sent at 10/3/2022  4:50 PM EDT -----  Regarding: eye exam and foot exam  10/03/22 4:50 PM    Hello, our patient Harmony Garcia has had Diabetic Eye Exam completed/performed  Please assist in updating the patient chart by making an External outreach to [facility] facility located in [facility]  The date of service is 2021  Thank you,  Suri Hearn [facility]     10/03/22 4:51 PM    Hello, our patient Harmony Garcia has hadDiabetic Foot Exam completed/performed  Please assist in updating the patient chart by making an External outreach to Dr Corine Bethea facility located in [facility]  The date of service is 2022      Thank you,  Suri Jones PG [facility]

## 2022-10-17 ENCOUNTER — OFFICE VISIT (OUTPATIENT)
Dept: FAMILY MEDICINE CLINIC | Facility: CLINIC | Age: 77
End: 2022-10-17
Payer: COMMERCIAL

## 2022-10-17 VITALS
BODY MASS INDEX: 39.27 KG/M2 | TEMPERATURE: 99.3 F | WEIGHT: 230 LBS | HEART RATE: 72 BPM | HEIGHT: 64 IN | SYSTOLIC BLOOD PRESSURE: 118 MMHG | DIASTOLIC BLOOD PRESSURE: 70 MMHG

## 2022-10-17 DIAGNOSIS — R05.1 ACUTE COUGH: Primary | ICD-10-CM

## 2022-10-17 DIAGNOSIS — R60.9 EDEMA, UNSPECIFIED TYPE: ICD-10-CM

## 2022-10-17 DIAGNOSIS — E11.9 TYPE 2 DIABETES MELLITUS WITHOUT COMPLICATION, WITHOUT LONG-TERM CURRENT USE OF INSULIN (HCC): ICD-10-CM

## 2022-10-17 DIAGNOSIS — J40 BRONCHITIS: ICD-10-CM

## 2022-10-17 PROCEDURE — 99214 OFFICE O/P EST MOD 30 MIN: CPT | Performed by: PHYSICIAN ASSISTANT

## 2022-10-17 RX ORDER — BENZONATATE 200 MG/1
200 CAPSULE ORAL 3 TIMES DAILY PRN
Qty: 30 CAPSULE | Refills: 0 | Status: SHIPPED | OUTPATIENT
Start: 2022-10-17 | End: 2022-10-27

## 2022-10-17 RX ORDER — SULFAMETHOXAZOLE AND TRIMETHOPRIM 800; 160 MG/1; MG/1
1 TABLET ORAL EVERY 12 HOURS SCHEDULED
Qty: 20 TABLET | Refills: 0 | Status: SHIPPED | OUTPATIENT
Start: 2022-10-17 | End: 2022-10-27

## 2022-10-17 NOTE — PATIENT INSTRUCTIONS
Assessment/plan:   Cough- most likely secondary to acute bronchitis  Would recommend starting on Bactrim DS, 1 tablet twice daily for 10 days  Patient also will be given Tessalon Perles to be used every 8 hours as needed for the cough  If symptoms do not get better in the next week or symptoms worsen would recommend chest x-ray which will be ordered  Patient verbalizes understanding and agreement with plan  2    Edema lower extremity-presently stable  Patient has had some weight gain since spring time  She does continue furosemide 40 mg daily  Lower extremity edema appears stable and lungs are clear without dullness to percussion at the bases  If symptoms worsen consider BNP blood work  3  Type 2 diabetes- stable, no medication changes

## 2022-10-17 NOTE — PROGRESS NOTES
Name: Wendie Gustafson      : 1945      MRN: [id number]  Encounter Provider: Arlene Rodriguez PA-C  Encounter Date: 10/17/2022   Encounter department: [address] [place] [address] PRIMARY CARE    Assessment & Plan     Patient Instructions     Assessment/plan:   Cough- most likely secondary to acute bronchitis  Would recommend starting on Bactrim DS, 1 tablet twice daily for 10 days  Patient also will be given Tessalon Perles to be used every 8 hours as needed for the cough  If symptoms do not get better in the next week or symptoms worsen would recommend chest x-ray which will be ordered  Patient verbalizes understanding and agreement with plan  2    Edema lower extremity-presently stable  Patient has had some weight gain since spring time  She does continue furosemide 40 mg daily  Lower extremity edema appears stable and lungs are clear without dullness to percussion at the bases  If symptoms worsen consider BNP blood work  3  Type 2 diabetes- stable, no medication changes  1  Acute cough  -     XR chest pa & lateral; Future; Expected date: 10/17/2022    2  Bronchitis  -     sulfamethoxazole-trimethoprim (BACTRIM DS) 800-160 mg per tablet; Take 1 tablet by mouth every 12 (twelve) hours for 10 days  -     benzonatate (TESSALON) 200 MG capsule; Take 1 capsule (200 mg total) by mouth 3 (three) times a day as needed for cough for up to 10 days    3  Edema, unspecified type    4  Type 2 diabetes mellitus without complication, without long-term current use of insulin (HCC)         Subjective        HPI:  This is a 69-year-old female who presents to the office with concerns over cough for the past 2 weeks  Cough has been coming and going but has sometimes seems worse at night  She has had some productive mucus with the cough  She is feeling a little bit more fatigued but she is not having any shortness of breath or respiratory difficulties    She does have a history of CHF but has not had any new swelling in the ankles  She does continue with furosemide 40 mg every 3rd day  She has tried some over-the-counter Robitussin without much benefit so far  Review of Systems   Constitutional: Negative for chills, fatigue and fever  HENT: Negative for congestion, ear pain and sinus pressure  Eyes: Negative for visual disturbance  Respiratory: Positive for cough  Negative for chest tightness and shortness of breath  Cardiovascular: Negative for chest pain and palpitations  Gastrointestinal: Negative for diarrhea, nausea and vomiting  Endocrine: Negative for polyuria  Genitourinary: Negative for dysuria and frequency  Musculoskeletal: Negative for arthralgias and myalgias  Skin: Negative for pallor and rash  Neurological: Negative for dizziness, weakness, light-headedness, numbness and headaches  Psychiatric/Behavioral: Negative for agitation, behavioral problems and sleep disturbance  All other systems reviewed and are negative  Current Outpatient Medications on File Prior to Visit   Medication Sig   • amLODIPine (NORVASC) 5 mg tablet TAKE 1 TABLET BY MOUTH EVERY DAY   • atenolol (TENORMIN) 25 mg tablet Take 1 tablet (25 mg total) by mouth daily at bedtime   • Blood Glucose Monitoring Suppl (FreeStyle Freedom Lite) w/Device KIT Use daily   • Cholecalciferol 125 MCG (5000 UT) capsule Take 1,000 Units by mouth daily   • citalopram (CeleXA) 20 mg tablet TAKE 1 TABLET BY MOUTH EVERY DAY   • Diclofenac Sodium (VOLTAREN) 1 % Apply 2 g topically in the morning and 2 g at noon and 2 g in the evening and 2 g before bedtime     • ferrous sulfate 325 (65 Fe) mg tablet Take 325 mg by mouth   • furosemide (LASIX) 40 mg tablet Take 40 mg by mouth 2 (two) times a day 1 tab my mouth as needed every 3rd day for increase edema   • glucose blood (FREESTYLE LITE) test strip Use 1 each daily   • Januvia 100 MG tablet TAKE 1 TABLET BY MOUTH EVERY DAY   • levothyroxine (Synthroid) 75 mcg tablet Take 1 tablet (75 mcg total) by mouth daily   • LORazepam (ATIVAN) 0 5 mg tablet Take 1 tablet (0 5 mg total) by mouth daily as needed for anxiety   • metFORMIN (GLUCOPHAGE) 1000 MG tablet Take 1 tablet (1,000 mg total) by mouth 2 (two) times a day   • methocarbamol (ROBAXIN) 500 mg tablet Take 500 mg by mouth in the morning and 500 mg at noon and 500 mg in the evening and 500 mg before bedtime  1 tab every 6 hrs as needed for muscle spasms  • Multiple Vitamin (MULTIVITAMINS PO) Take 1 capsule by mouth daily   • simvastatin (ZOCOR) 40 mg tablet TAKE 1 TABLET BY MOUTH EVERY DAY   • Dextrose, Diabetic Use, (Glucose) 15 GM/59ML LIQD Take 15 g by mouth (Patient not taking: Reported on 10/17/2022)       Objective     /70   Pulse 72   Temp 99 3 °F (37 4 °C)   Ht 5' 4" (1 626 m)   Wt 104 kg (230 lb)   BMI 39 48 kg/m²     Physical Exam  Vitals and nursing note reviewed  Constitutional:       General: She is not in acute distress  Appearance: She is well-developed  HENT:      Head: Normocephalic and atraumatic  Right Ear: External ear normal       Left Ear: External ear normal       Nose: Nose normal       Mouth/Throat:      Pharynx: No oropharyngeal exudate  Eyes:      Conjunctiva/sclera: Conjunctivae normal       Pupils: Pupils are equal, round, and reactive to light  Neck:      Thyroid: No thyromegaly  Trachea: No tracheal deviation  Cardiovascular:      Rate and Rhythm: Normal rate and regular rhythm  Heart sounds: Normal heart sounds  No murmur heard  No friction rub  Pulmonary:      Effort: Pulmonary effort is normal  No respiratory distress  Breath sounds: Normal breath sounds  No wheezing or rales  Abdominal:      General: Bowel sounds are normal  There is no distension  Palpations: Abdomen is soft  Tenderness: There is no abdominal tenderness  There is no guarding or rebound  Musculoskeletal:         General: No tenderness  Normal range of motion        Cervical back: Normal range of motion and neck supple  Right lower leg: Edema present  Left lower leg: Edema present  Comments:   1+ pitting edema bilateral lower extremities, baseline per patient  Lymphadenopathy:      Cervical: No cervical adenopathy  Skin:     General: Skin is warm and dry  Findings: No erythema or rash  Neurological:      Mental Status: She is alert and oriented to person, place, and time  Cranial Nerves: No cranial nerve deficit  Coordination: Coordination normal    Psychiatric:         Behavior: Behavior normal          Thought Content:  Thought content normal        Asya Hilton PA-C

## 2022-10-17 NOTE — TELEPHONE ENCOUNTER
As a follow-up, a second attempt has been made for outreach via telephone call  The outcome of the telephone call was a fax request form to be sent to Office, please see Contacts section for details       attx2 fx & call eye   Foot done    Thank you  Rima Manuel

## 2022-10-18 ENCOUNTER — TELEPHONE (OUTPATIENT)
Dept: ADMINISTRATIVE | Facility: OTHER | Age: 77
End: 2022-10-18

## 2022-10-18 NOTE — TELEPHONE ENCOUNTER
Upon review of the In Basket request and the patient's chart, initial outreach has been made via fax, please see Contacts section for details       Thank you  Sawyer Howard

## 2022-10-18 NOTE — TELEPHONE ENCOUNTER
----- Message from Nata Bell sent at 10/17/2022  4:10 PM EDT -----  Regarding: eye exam  10/17/22 4:11 PM    Swati, our patient Sanket Vega has had Diabetic Eye Exam completed/performed  Please assist in updating the patient chart by making an External outreach to [facility] facility located in [facility]  The date of service is 2021      Thank you,  Suri Cuevas  [facility] PRIMARY CARE

## 2022-10-18 NOTE — LETTER
Diabetic Eye Exam Form    Date Requested: 10/18/22  Patient: Bryanna Kenney  Patient : 1945   Referring Provider: Gael Estrada PA-C    DIABETIC Eye Exam Date _______________________________    Type of Exam MUST be documented for Diabetic Eye Exams  Please CHECK ONE  Retinal Exam       Dilated Retinal Exam       OCT       Optomap-Iris Exam      Fundus Photography     Left Eye - Please check Retinopathy AND Type or No Retinopathy      Exam did show retinopathy    Exam did not show retinopathy         Mild     Proliferative           Moderate    Severe            None         Right Eye - Please check Retinopathy AND Type or No Retinopathy     Exam did show retinopathy    Exam did not show retinopathy         Mild     Proliferative        Moderate    Severe        None       Comments __________________________________________________________    Practice Providing Exam ______________________________________________    Exam Performed By (print name) _______________________________________      Provider Signature ___________________________________________________    These reports are needed for  compliance  Please fax this completed form and a copy of the Diabetic Eye Exam report to our office located at [place] [address] as soon as possible via [phone] attention Debra: Phone [phone]  We thank you for your assistance in treating our mutual patient

## 2022-10-18 NOTE — LETTER
Diabetic Eye Exam Form    Date Requested: 10/24/22  Patient: Bety Adhikari  Patient : 1945   Referring Provider: Kathy Larson PA-C    DIABETIC Eye Exam Date _______________________________    Type of Exam MUST be documented for Diabetic Eye Exams  Please CHECK ONE  Retinal Exam       Dilated Retinal Exam       OCT       Optomap-Iris Exam      Fundus Photography     Left Eye - Please check Retinopathy AND Type or No Retinopathy      Exam did show retinopathy    Exam did not show retinopathy         Mild     Proliferative           Moderate    Severe            None         Right Eye - Please check Retinopathy AND Type or No Retinopathy     Exam did show retinopathy    Exam did not show retinopathy         Mild     Proliferative        Moderate    Severe        None       Comments __________________________________________________________    Practice Providing Exam ______________________________________________    Exam Performed By (print name) _______________________________________      Provider Signature ___________________________________________________    These reports are needed for  compliance  Please fax this completed form and a copy of the Diabetic Eye Exam report to our office located at [place] [address] as soon as possible via [phone] attention Debra: Phone [phone]  We thank you for your assistance in treating our mutual patient

## 2022-10-24 NOTE — TELEPHONE ENCOUNTER
As a follow-up, a second attempt has been made for outreach via fax to facility  , please see Contacts section for details      Thank you  Ova Earing

## 2022-10-25 NOTE — TELEPHONE ENCOUNTER
As a final attempt, a third outreach has been made via fax to facility  Please see Contacts section for details  This encounter will be closed and completed by end of day  Should we receive the requested information because of previous outreach attempts, the requested patient's chart will be updated appropriately       Thank you  Phill Aggarwal

## 2022-10-27 NOTE — TELEPHONE ENCOUNTER
Upon review of the In Basket request we were able to locate, review, and update the patient chart as requested for Diabetic Eye Exam     Any additional questions or concerns should be emailed to the Practice Liaisons via the appropriate education email address, please do not reply via In Basket      Thank you  Favian Casiano

## 2022-11-11 ENCOUNTER — HOSPITAL ENCOUNTER (OUTPATIENT)
Dept: BONE DENSITY | Facility: MEDICAL CENTER | Age: 77
Discharge: HOME | End: 2022-11-11

## 2022-11-11 DIAGNOSIS — S72.90XD CLOSED FRACTURE OF FEMUR WITH ROUTINE HEALING, UNSPECIFIED FRACTURE MORPHOLOGY, UNSPECIFIED LATERALITY, UNSPECIFIED PORTION OF FEMUR, SUBSEQUENT ENCOUNTER: ICD-10-CM

## 2022-11-16 DIAGNOSIS — M85.80 OSTEOPENIA, UNSPECIFIED LOCATION: Primary | ICD-10-CM

## 2023-02-09 DIAGNOSIS — I10 ESSENTIAL HYPERTENSION: ICD-10-CM

## 2023-02-09 DIAGNOSIS — E78.2 MIXED HYPERLIPIDEMIA: ICD-10-CM

## 2023-02-09 DIAGNOSIS — E11.9 TYPE 2 DIABETES MELLITUS WITHOUT COMPLICATION, WITHOUT LONG-TERM CURRENT USE OF INSULIN (HCC): ICD-10-CM

## 2023-02-09 DIAGNOSIS — F32.9 REACTIVE DEPRESSION: ICD-10-CM

## 2023-02-09 RX ORDER — SITAGLIPTIN 100 MG/1
TABLET, FILM COATED ORAL
Qty: 90 TABLET | Refills: 1 | Status: SHIPPED | OUTPATIENT
Start: 2023-02-09

## 2023-02-09 RX ORDER — ATENOLOL 25 MG/1
TABLET ORAL
Qty: 90 TABLET | Refills: 1 | Status: SHIPPED | OUTPATIENT
Start: 2023-02-09

## 2023-02-09 RX ORDER — SIMVASTATIN 40 MG
TABLET ORAL
Qty: 90 TABLET | Refills: 1 | Status: SHIPPED | OUTPATIENT
Start: 2023-02-09

## 2023-02-09 RX ORDER — CITALOPRAM 20 MG/1
TABLET ORAL
Qty: 90 TABLET | Refills: 1 | Status: SHIPPED | OUTPATIENT
Start: 2023-02-09

## 2023-03-24 DIAGNOSIS — E11.9 TYPE 2 DIABETES MELLITUS WITHOUT COMPLICATION, WITHOUT LONG-TERM CURRENT USE OF INSULIN (HCC): ICD-10-CM

## 2023-03-24 DIAGNOSIS — I10 ESSENTIAL HYPERTENSION: ICD-10-CM

## 2023-03-24 DIAGNOSIS — E03.9 HYPOTHYROIDISM, UNSPECIFIED TYPE: ICD-10-CM

## 2023-03-27 RX ORDER — AMLODIPINE BESYLATE 5 MG/1
TABLET ORAL
Qty: 90 TABLET | Refills: 1 | Status: SHIPPED | OUTPATIENT
Start: 2023-03-27

## 2023-03-27 RX ORDER — LEVOTHYROXINE SODIUM 0.07 MG/1
TABLET ORAL
Qty: 90 TABLET | Refills: 1 | Status: SHIPPED | OUTPATIENT
Start: 2023-03-27

## 2023-03-30 ENCOUNTER — RA CDI HCC (OUTPATIENT)
Dept: OTHER | Facility: HOSPITAL | Age: 78
End: 2023-03-30

## 2023-03-30 NOTE — PROGRESS NOTES
[facility]  coding opportunities       Chart reviewed, no opportunity found:   [address]        Patients Insurance     Medicare Insurance: [employer]

## 2023-04-03 ENCOUNTER — APPOINTMENT (OUTPATIENT)
Dept: LAB | Facility: CLINIC | Age: 78
End: 2023-04-03

## 2023-04-03 DIAGNOSIS — R80.9 MICROALBUMINURIA: ICD-10-CM

## 2023-04-03 DIAGNOSIS — E03.9 HYPOTHYROIDISM, UNSPECIFIED TYPE: ICD-10-CM

## 2023-04-03 DIAGNOSIS — I10 ESSENTIAL HYPERTENSION: ICD-10-CM

## 2023-04-03 DIAGNOSIS — E78.2 MIXED HYPERLIPIDEMIA: ICD-10-CM

## 2023-04-03 DIAGNOSIS — E11.9 TYPE 2 DIABETES MELLITUS WITHOUT COMPLICATION, WITHOUT LONG-TERM CURRENT USE OF INSULIN (HCC): ICD-10-CM

## 2023-04-03 LAB
ALBUMIN SERPL BCP-MCNC: 3.7 G/DL (ref 3.5–5)
ALP SERPL-CCNC: 53 U/L (ref 46–116)
ALT SERPL W P-5'-P-CCNC: 21 U/L (ref 12–78)
ANION GAP SERPL CALCULATED.3IONS-SCNC: 3 MMOL/L (ref 4–13)
AST SERPL W P-5'-P-CCNC: 18 U/L (ref 5–45)
BASOPHILS # BLD AUTO: 0.05 THOUSANDS/ÂΜL (ref 0–0.1)
BASOPHILS NFR BLD AUTO: 1 % (ref 0–1)
BILIRUB SERPL-MCNC: 0.36 MG/DL (ref 0.2–1)
BUN SERPL-MCNC: 22 MG/DL (ref 5–25)
CALCIUM SERPL-MCNC: 9.6 MG/DL (ref 8.3–10.1)
CHLORIDE SERPL-SCNC: 105 MMOL/L (ref 96–108)
CHOLEST SERPL-MCNC: 144 MG/DL
CO2 SERPL-SCNC: 29 MMOL/L (ref 21–32)
CREAT SERPL-MCNC: 0.63 MG/DL (ref 0.6–1.3)
CREAT UR-MCNC: 131 MG/DL
EOSINOPHIL # BLD AUTO: 0.19 THOUSAND/ÂΜL (ref 0–0.61)
EOSINOPHIL NFR BLD AUTO: 3 % (ref 0–6)
ERYTHROCYTE [DISTWIDTH] IN BLOOD BY AUTOMATED COUNT: 14 % (ref 11.6–15.1)
EST. AVERAGE GLUCOSE BLD GHB EST-MCNC: 134 MG/DL
GFR SERPL CREATININE-BSD FRML MDRD: 85 ML/MIN/1.73SQ M
GLUCOSE P FAST SERPL-MCNC: 99 MG/DL (ref 65–99)
HBA1C MFR BLD: 6.3 %
HCT VFR BLD AUTO: 39.3 % (ref 34.8–46.1)
HDLC SERPL-MCNC: 69 MG/DL
HGB BLD-MCNC: 12.5 G/DL (ref 11.5–15.4)
IMM GRANULOCYTES # BLD AUTO: 0.02 THOUSAND/UL (ref 0–0.2)
IMM GRANULOCYTES NFR BLD AUTO: 0 % (ref 0–2)
LDLC SERPL CALC-MCNC: 52 MG/DL (ref 0–100)
LYMPHOCYTES # BLD AUTO: 2.03 THOUSANDS/ÂΜL (ref 0.6–4.47)
LYMPHOCYTES NFR BLD AUTO: 29 % (ref 14–44)
MCH RBC QN AUTO: 30.6 PG (ref 26.8–34.3)
MCHC RBC AUTO-ENTMCNC: 31.8 G/DL (ref 31.4–37.4)
MCV RBC AUTO: 96 FL (ref 82–98)
MICROALBUMIN UR-MCNC: 48.7 MG/L (ref 0–20)
MICROALBUMIN/CREAT 24H UR: 37 MG/G CREATININE (ref 0–30)
MONOCYTES # BLD AUTO: 0.53 THOUSAND/ÂΜL (ref 0.17–1.22)
MONOCYTES NFR BLD AUTO: 8 % (ref 4–12)
NEUTROPHILS # BLD AUTO: 4.21 THOUSANDS/ÂΜL (ref 1.85–7.62)
NEUTS SEG NFR BLD AUTO: 59 % (ref 43–75)
NRBC BLD AUTO-RTO: 0 /100 WBCS
PLATELET # BLD AUTO: 346 THOUSANDS/UL (ref 149–390)
PMV BLD AUTO: 10.6 FL (ref 8.9–12.7)
POTASSIUM SERPL-SCNC: 3.9 MMOL/L (ref 3.5–5.3)
PROT SERPL-MCNC: 7.4 G/DL (ref 6.4–8.4)
RBC # BLD AUTO: 4.08 MILLION/UL (ref 3.81–5.12)
SODIUM SERPL-SCNC: 137 MMOL/L (ref 135–147)
TRIGL SERPL-MCNC: 115 MG/DL
TSH SERPL DL<=0.05 MIU/L-ACNC: 1.85 UIU/ML (ref 0.45–4.5)
WBC # BLD AUTO: 7.03 THOUSAND/UL (ref 4.31–10.16)

## 2023-06-20 DIAGNOSIS — I10 ESSENTIAL HYPERTENSION: ICD-10-CM

## 2023-06-20 DIAGNOSIS — F32.9 REACTIVE DEPRESSION: ICD-10-CM

## 2023-06-20 DIAGNOSIS — E11.9 TYPE 2 DIABETES MELLITUS WITHOUT COMPLICATION, WITHOUT LONG-TERM CURRENT USE OF INSULIN (HCC): ICD-10-CM

## 2023-06-20 DIAGNOSIS — E78.2 MIXED HYPERLIPIDEMIA: ICD-10-CM

## 2023-06-20 RX ORDER — SITAGLIPTIN 100 MG/1
TABLET, FILM COATED ORAL
Qty: 90 TABLET | Refills: 1 | Status: SHIPPED | OUTPATIENT
Start: 2023-06-20

## 2023-06-20 RX ORDER — ATENOLOL 25 MG/1
TABLET ORAL
Qty: 90 TABLET | Refills: 1 | Status: SHIPPED | OUTPATIENT
Start: 2023-06-20

## 2023-06-20 RX ORDER — SIMVASTATIN 40 MG
TABLET ORAL
Qty: 90 TABLET | Refills: 1 | Status: SHIPPED | OUTPATIENT
Start: 2023-06-20

## 2023-06-20 RX ORDER — CITALOPRAM 20 MG/1
TABLET ORAL
Qty: 90 TABLET | Refills: 1 | Status: SHIPPED | OUTPATIENT
Start: 2023-06-20

## 2023-09-23 DIAGNOSIS — E03.9 HYPOTHYROIDISM, UNSPECIFIED TYPE: ICD-10-CM

## 2023-09-23 DIAGNOSIS — I10 ESSENTIAL HYPERTENSION: ICD-10-CM

## 2023-09-25 RX ORDER — LEVOTHYROXINE SODIUM 0.07 MG/1
TABLET ORAL
Qty: 90 TABLET | Refills: 1 | Status: SHIPPED | OUTPATIENT
Start: 2023-09-25

## 2023-09-25 RX ORDER — AMLODIPINE BESYLATE 5 MG/1
TABLET ORAL
Qty: 90 TABLET | Refills: 1 | Status: SHIPPED | OUTPATIENT
Start: 2023-09-25

## 2023-10-10 ENCOUNTER — RA CDI HCC (OUTPATIENT)
Dept: OTHER | Facility: HOSPITAL | Age: 78
End: 2023-10-10

## 2023-10-10 NOTE — PROGRESS NOTES
[facility] [address] [facility] coding opportunities          Chart Reviewed number of suggestions sent to Provider: 3   I73.9  E11.40  E11.51  See 10/4/22 podiatry assessment    Patients Insurance     Medicare Insurance: [employer]

## 2023-10-11 ENCOUNTER — APPOINTMENT (OUTPATIENT)
Dept: LAB | Facility: CLINIC | Age: 78
End: 2023-10-11
Payer: COMMERCIAL

## 2023-10-11 DIAGNOSIS — E11.9 TYPE 2 DIABETES MELLITUS WITHOUT COMPLICATION, WITHOUT LONG-TERM CURRENT USE OF INSULIN (HCC): ICD-10-CM

## 2023-10-11 DIAGNOSIS — E66.01 SEVERE OBESITY (BMI 35.0-35.9 WITH COMORBIDITY): ICD-10-CM

## 2023-10-11 DIAGNOSIS — R80.9 MICROALBUMINURIA: ICD-10-CM

## 2023-10-11 DIAGNOSIS — E78.2 MIXED HYPERLIPIDEMIA: ICD-10-CM

## 2023-10-11 DIAGNOSIS — I10 ESSENTIAL HYPERTENSION: ICD-10-CM

## 2023-10-11 LAB
ALBUMIN SERPL BCP-MCNC: 4 G/DL (ref 3.5–5)
ALP SERPL-CCNC: 56 U/L (ref 34–104)
ALT SERPL W P-5'-P-CCNC: 12 U/L (ref 7–52)
ANION GAP SERPL CALCULATED.3IONS-SCNC: 9 MMOL/L
AST SERPL W P-5'-P-CCNC: 17 U/L (ref 13–39)
BILIRUB SERPL-MCNC: 0.44 MG/DL (ref 0.2–1)
BUN SERPL-MCNC: 22 MG/DL (ref 5–25)
CALCIUM SERPL-MCNC: 9.6 MG/DL (ref 8.4–10.2)
CHLORIDE SERPL-SCNC: 101 MMOL/L (ref 96–108)
CHOLEST SERPL-MCNC: 165 MG/DL
CO2 SERPL-SCNC: 30 MMOL/L (ref 21–32)
CREAT SERPL-MCNC: 0.6 MG/DL (ref 0.6–1.3)
ERYTHROCYTE [DISTWIDTH] IN BLOOD BY AUTOMATED COUNT: 14.1 % (ref 11.6–15.1)
EST. AVERAGE GLUCOSE BLD GHB EST-MCNC: 154 MG/DL
GFR SERPL CREATININE-BSD FRML MDRD: 86 ML/MIN/1.73SQ M
GLUCOSE P FAST SERPL-MCNC: 113 MG/DL (ref 65–99)
HBA1C MFR BLD: 7 %
HCT VFR BLD AUTO: 40.5 % (ref 34.8–46.1)
HDLC SERPL-MCNC: 66 MG/DL
HGB BLD-MCNC: 13.1 G/DL (ref 11.5–15.4)
LDLC SERPL CALC-MCNC: 72 MG/DL (ref 0–100)
MCH RBC QN AUTO: 31.9 PG (ref 26.8–34.3)
MCHC RBC AUTO-ENTMCNC: 32.3 G/DL (ref 31.4–37.4)
MCV RBC AUTO: 99 FL (ref 82–98)
PLATELET # BLD AUTO: 325 THOUSANDS/UL (ref 149–390)
PMV BLD AUTO: 10.5 FL (ref 8.9–12.7)
POTASSIUM SERPL-SCNC: 4.5 MMOL/L (ref 3.5–5.3)
PROT SERPL-MCNC: 7.5 G/DL (ref 6.4–8.4)
RBC # BLD AUTO: 4.11 MILLION/UL (ref 3.81–5.12)
SODIUM SERPL-SCNC: 140 MMOL/L (ref 135–147)
TRIGL SERPL-MCNC: 135 MG/DL
TSH SERPL DL<=0.05 MIU/L-ACNC: 2.43 UIU/ML (ref 0.45–4.5)
WBC # BLD AUTO: 7.49 THOUSAND/UL (ref 4.31–10.16)

## 2023-10-11 PROCEDURE — 84443 ASSAY THYROID STIM HORMONE: CPT

## 2023-10-11 PROCEDURE — 36415 COLL VENOUS BLD VENIPUNCTURE: CPT

## 2023-10-11 PROCEDURE — 80061 LIPID PANEL: CPT

## 2023-10-11 PROCEDURE — 85027 COMPLETE CBC AUTOMATED: CPT

## 2023-10-11 PROCEDURE — 83036 HEMOGLOBIN GLYCOSYLATED A1C: CPT

## 2023-10-11 PROCEDURE — 80053 COMPREHEN METABOLIC PANEL: CPT

## 2023-10-12 ENCOUNTER — APPOINTMENT (OUTPATIENT)
Dept: LAB | Facility: CLINIC | Age: 78
End: 2023-10-12
Payer: COMMERCIAL

## 2023-10-12 LAB
CREAT UR-MCNC: 42.5 MG/DL
MICROALBUMIN UR-MCNC: 27.9 MG/L
MICROALBUMIN/CREAT 24H UR: 66 MG/G CREATININE (ref 0–30)

## 2023-10-12 PROCEDURE — 82570 ASSAY OF URINE CREATININE: CPT

## 2023-10-12 PROCEDURE — 82043 UR ALBUMIN QUANTITATIVE: CPT

## 2023-10-16 ENCOUNTER — OFFICE VISIT (OUTPATIENT)
Dept: FAMILY MEDICINE CLINIC | Facility: CLINIC | Age: 78
End: 2023-10-16
Payer: COMMERCIAL

## 2023-10-16 VITALS
OXYGEN SATURATION: 98 % | DIASTOLIC BLOOD PRESSURE: 68 MMHG | HEART RATE: 65 BPM | RESPIRATION RATE: 16 BRPM | WEIGHT: 234 LBS | TEMPERATURE: 98.1 F | HEIGHT: 64 IN | BODY MASS INDEX: 39.95 KG/M2 | SYSTOLIC BLOOD PRESSURE: 132 MMHG

## 2023-10-16 DIAGNOSIS — I10 ESSENTIAL HYPERTENSION: ICD-10-CM

## 2023-10-16 DIAGNOSIS — R40.0 DAYTIME SOMNOLENCE: ICD-10-CM

## 2023-10-16 DIAGNOSIS — E66.01 SEVERE OBESITY (BMI 35.0-35.9 WITH COMORBIDITY): ICD-10-CM

## 2023-10-16 DIAGNOSIS — Z00.00 HEALTHCARE MAINTENANCE: ICD-10-CM

## 2023-10-16 DIAGNOSIS — Z23 ENCOUNTER FOR IMMUNIZATION: ICD-10-CM

## 2023-10-16 DIAGNOSIS — E11.9 TYPE 2 DIABETES MELLITUS WITHOUT COMPLICATION, WITHOUT LONG-TERM CURRENT USE OF INSULIN (HCC): Primary | ICD-10-CM

## 2023-10-16 DIAGNOSIS — R80.9 MICROALBUMINURIA: ICD-10-CM

## 2023-10-16 DIAGNOSIS — E11.65 TYPE 2 DIABETES MELLITUS WITH HYPERGLYCEMIA, WITHOUT LONG-TERM CURRENT USE OF INSULIN (HCC): ICD-10-CM

## 2023-10-16 DIAGNOSIS — E03.9 HYPOTHYROIDISM, UNSPECIFIED TYPE: ICD-10-CM

## 2023-10-16 PROCEDURE — G0008 ADMIN INFLUENZA VIRUS VAC: HCPCS | Performed by: PHYSICIAN ASSISTANT

## 2023-10-16 PROCEDURE — 90662 IIV NO PRSV INCREASED AG IM: CPT | Performed by: PHYSICIAN ASSISTANT

## 2023-10-16 PROCEDURE — 99214 OFFICE O/P EST MOD 30 MIN: CPT | Performed by: PHYSICIAN ASSISTANT

## 2023-10-16 PROCEDURE — G0439 PPPS, SUBSEQ VISIT: HCPCS | Performed by: PHYSICIAN ASSISTANT

## 2023-10-16 RX ORDER — FUROSEMIDE 40 MG/1
40 TABLET ORAL
Qty: 60 TABLET | Refills: 1 | Status: SHIPPED | OUTPATIENT
Start: 2023-10-16

## 2023-10-16 NOTE — PROGRESS NOTES
Assessment and Plan:     Patient Instructions   Assessment/plan:  1. Type 2 diabetes-stable with hemoglobin A1c of 7.0 on Januvia therapy. Patient has stopped metformin therapy because of diarrhea. Diabetic foot exam performed. Patient does continue to follow with ophthalmology in November yearly. 2.  Benign essential hypertension-stable on atenolol and amlodipine, no medication changes. 3.  Microalbuminuria-stable with glucose control. 4.  Hypothyroidism-stable with levothyroxine 75 mcg daily. 5.  Severe obesity-continue healthy diet and exercise efforts. 6.  Hyperlipidemia-stable on simvastatin 40 mg daily. 7.  Daytime somnolence-patient has normal thyroid levels and is not anemic. She does not have any shortness of breath or chest pains. Her symptoms are more being sleepy during the daytime and lack of restorative sleep. Would recommend consult with sleep medicine to rule out sleep apnea. 8.  Healthcare maintenance-annual Medicare wellness visit. Flu vaccine given today. Patient is up-to-date with pneumonia vaccine.     Problem List Items Addressed This Visit        Endocrine    Hypothyroidism    Relevant Orders    TSH, 3rd generation with Free T4 reflex    Lipid Panel with Direct LDL reflex    CBC and differential    Albumin / creatinine urine ratio    Comprehensive metabolic panel    Hemoglobin A1C    Type 2 diabetes mellitus with hyperglycemia, without long-term current use of insulin (HCC) - Primary       Cardiovascular and Mediastinum    Essential hypertension    Relevant Medications    furosemide (LASIX) 40 mg tablet    Other Relevant Orders    TSH, 3rd generation with Free T4 reflex    Lipid Panel with Direct LDL reflex    CBC and differential    Albumin / creatinine urine ratio    Comprehensive metabolic panel    Hemoglobin A1C       Other    Microalbuminuria    Relevant Orders    TSH, 3rd generation with Free T4 reflex    Lipid Panel with Direct LDL reflex    CBC and differential Albumin / creatinine urine ratio    Comprehensive metabolic panel    Hemoglobin A1C    Severe obesity (BMI 35.0-35.9 with comorbidity)     Relevant Orders    TSH, 3rd generation with Free T4 reflex    Lipid Panel with Direct LDL reflex    CBC and differential    Albumin / creatinine urine ratio    Comprehensive metabolic panel    Hemoglobin A1C   Other Visit Diagnoses     Healthcare maintenance        Daytime somnolence        Relevant Orders    Ambulatory Referral to Sleep Medicine    Encounter for immunization        Relevant Orders    influenza vaccine, high-dose, PF 0.7 mL (FLUZONE HIGH-DOSE) (Completed)        BMI Counseling: Body mass index is 40.17 kg/m². The BMI is above normal. Nutrition recommendations include decreasing portion sizes. Exercise recommendations include exercising 3-5 times per week. Rationale for BMI follow-up plan is due to patient being overweight or obese. Preventive health issues were discussed with patient, and age appropriate screening tests were ordered as noted in patient's After Visit Summary. Personalized health advice and appropriate referrals for health education or preventive services given if needed, as noted in patient's After Visit Summary. History of Present Illness:     Patient presents for a Medicare Wellness Visit    HPI: This is a 39-year-old female who presents to the office for follow-up of chronic health conditions including hypertension, type 2 diabetes, microalbuminuria, and hyperlipidemia. She has stopped taking metformin because of her diarrhea symptoms and they have improved. She does continue Januvia for her diabetes. She has had recent labs which she is here to review. She feels that she has been doing well for the most part but she does feel sleepy during the daytime. She states that sometimes she could easily sleep 12 to 14 hours. She recalls being checked for sleep apnea several years back but it has been quite a while.   She also has history of edema of the lower extremities and continues furosemide every third day. She has been stable on this regimen and labs show that her potassium and creatinine is stable. Patient Care Team:  Silke Carpenter PA-C as PCP - General (Family Medicine)  MD Silke Carter PA-C Hilarie Motes, MD Dillard Pain, PA-C     Review of Systems:     Review of Systems   Constitutional:  Negative for chills, fatigue and fever. HENT:  Negative for congestion, ear pain and sinus pressure. Eyes:  Negative for visual disturbance. Respiratory:  Negative for cough, chest tightness and shortness of breath. Cardiovascular:  Negative for chest pain and palpitations. Gastrointestinal:  Negative for diarrhea, nausea and vomiting. Endocrine: Negative for polyuria. Genitourinary:  Negative for dysuria and frequency. Musculoskeletal:  Negative for arthralgias and myalgias. Skin:  Negative for pallor and rash. Neurological:  Negative for dizziness, weakness, light-headedness, numbness and headaches. Psychiatric/Behavioral:  Positive for sleep disturbance. Negative for agitation, behavioral problems, decreased concentration, self-injury and suicidal ideas. The patient is not nervous/anxious. Excessive somnolence   All other systems reviewed and are negative.        Problem List:     Patient Active Problem List   Diagnosis   • Essential hypertension   • Mixed hyperlipidemia   • Type 2 diabetes mellitus with hyperglycemia, without long-term current use of insulin (HCC)   • Reactive depression   • Incontinence of urine   • Depression with anxiety   • Dupuytren's contracture   • Hearing loss   • Abnormal finding on mammography   • LBBB (left bundle branch block)   • Nocturnal leg cramps   • Osteoarthritis of knee   • Baker's cyst   • Preop general physical exam   • Need for vaccination against Streptococcus pneumoniae using pneumococcal conjugate vaccine 13   • COVID-19 virus infection   • Severe obesity (BMI 35.0-35.9 with comorbidity)    • Microalbuminuria   • Hypothyroidism      Past Medical and Surgical History:     Past Medical History:   Diagnosis Date   • Arrhythmia    • Diabetes mellitus ([address])    • Hyperlipidemia    • Hypertension    • Type 2 diabetes mellitus with hyperglycemia ([address])     last assessed: 2014     Past Surgical History:   Procedure Laterality Date   • APPENDECTOMY     • BREAST SURGERY      Reduction Procedure bilateral   • KNEE SURGERY     • LITHOTRIPSY      renal   • LA OPEN TX PHALANGEAL SHAFT FRACTURE PROX/MIDDLE EA Left 10/15/2019    Procedure: Closed reduction percutaneous pinning;  Surgeon: Elroy Riedel, MD;  Location: [facility] MAIN OR;  Service: Orthopedics   • TONSILLECTOMY AND ADENOIDECTOMY        Family History:     Family History   Problem Relation Age of Onset   • Diabetes Mother    • Hypertension Mother    • Colon cancer Father    • Stroke Father       Social History:     Social History     Socioeconomic History   • Marital status:      Spouse name: None   • Number of children: None   • Years of education: None   • Highest education level: None   Occupational History   • Occupation: Currently works part-time   Tobacco Use   • Smoking status: Former     Types: Cigarettes     Quit date: 1968     Years since quittin.7   • Smokeless tobacco: Never   • Tobacco comments:     Per allscripts - never smoker.  Secondhand smoke exposire   Substance and Sexual Activity   • Alcohol use: Yes     Comment: occasional   • Drug use: Never   • Sexual activity: None   Other Topics Concern   • None   Social History Narrative   • None     Social Determinants of Health     Financial Resource Strain: Low Risk  (10/16/2023)    Overall Financial Resource Strain (CARDIA)    • Difficulty of Paying Living Expenses: Not hard at all   Food Insecurity: Not on file   Transportation Needs: No Transportation Needs (10/16/2023)    PRAPARE - Transportation    • Lack of Transportation (Medical): No    • Lack of Transportation (Non-Medical): No   Physical Activity: Not on file   Stress: Not on file   Social Connections: Not on file   Intimate Partner Violence: Not on file   Housing Stability: Not on file      Medications and Allergies:     Current Outpatient Medications   Medication Sig Dispense Refill   • amLODIPine (NORVASC) 5 mg tablet TAKE 1 TABLET BY MOUTH EVERY DAY 90 tablet 1   • atenolol (TENORMIN) 25 mg tablet TAKE 1 TABLET BY MOUTH EVERYDAY AT BEDTIME 90 tablet 1   • Blood Glucose Monitoring Suppl (FreeStyle Freedom Lite) w/Device KIT Use daily 1 kit 0   • Cholecalciferol 125 MCG (5000 UT) capsule Take 1,000 Units by mouth daily     • citalopram (CeleXA) 20 mg tablet TAKE 1 TABLET BY MOUTH EVERY DAY 90 tablet 1   • Diclofenac Sodium (VOLTAREN) 1 % Apply 2 g topically in the morning and 2 g at noon and 2 g in the evening and 2 g before bedtime. (Patient taking differently: Apply 2 g topically if needed) 350 g 0   • furosemide (LASIX) 40 mg tablet Take 1 tablet (40 mg total) by mouth every 3 (three) days 1 tab my mouth as needed every 3rd day for increase edema 60 tablet 1   • glucose blood (FREESTYLE LITE) test strip Use 1 each daily 100 each 1   • Januvia 100 MG tablet TAKE 1 TABLET BY MOUTH EVERY DAY 90 tablet 1   • levothyroxine 75 mcg tablet TAKE 1 TABLET BY MOUTH EVERY DAY 90 tablet 1   • methocarbamol (ROBAXIN) 500 mg tablet Take 500 mg by mouth in the morning and 500 mg at noon and 500 mg in the evening and 500 mg before bedtime. 1 tab every 6 hrs as needed for muscle spasms. • Multiple Vitamin (MULTIVITAMINS PO) Take 1 capsule by mouth daily     • simvastatin (ZOCOR) 40 mg tablet TAKE 1 TABLET BY MOUTH EVERY DAY 90 tablet 1     No current facility-administered medications for this visit.      Allergies   Allergen Reactions   • Penicillins Anaphylaxis      Immunizations:     Immunization History   Administered Date(s) Administered   • INFLUENZA 10/03/2022   • Influenza Split High Dose Preservative Free IM 09/19/2014, 10/20/2015, 09/26/2016, 09/13/2017   • Influenza, high dose seasonal 0.7 mL 12/11/2018, 11/09/2020, 12/02/2021, 10/03/2022, 10/16/2023   • Influenza, seasonal, injectable 1945, 11/23/2012   • Pneumococcal Conjugate 13-Valent 06/13/2019   • Pneumococcal Polysaccharide PPV23 01/23/2017   • Tuberculin Skin Test-PPD Intradermal 04/28/2022      Health Maintenance:         Topic Date Due   • Hepatitis C Screening  Never done         Topic Date Due   • COVID-19 Vaccine (1) Never done      Medicare Screening Tests and Risk Assessments:     Ericka Sanders is here for her Subsequent Wellness visit. Health Risk Assessment:   Patient rates overall health as good. Patient feels that their physical health rating is same. Patient is satisfied with their life. Eyesight was rated as slightly worse. Hearing was rated as slightly worse. Patient feels that their emotional and mental health rating is same. Patients states they are never, rarely angry. Patient states they are always unusually tired/fatigued. Pain experienced in the last 7 days has been some. Patient's pain rating has been 8/10. Patient states that she has experienced no weight loss or gain in last 6 months. Depression Screening:   PHQ-9 Score: 5      Fall Risk Screening: In the past year, patient has experienced: no history of falling in past year      Urinary Incontinence Screening:   Patient has leaked urine accidently in the last six months. Home Safety:  Patient does not have trouble with stairs inside or outside of their home. Patient has working smoke alarms and has working carbon monoxide detector. Home safety hazards include: loose rugs on the floor. Nutrition:   Current diet is Diabetic. Medications:   Patient is currently taking over-the-counter supplements. OTC medications include: see medication list. Patient is able to manage medications.      Activities of Daily Living (ADLs)/Instrumental Activities of Daily Living (IADLs):   Walk and transfer into and out of bed and chair?: Yes  Dress and groom yourself?: Yes    Bathe or shower yourself?: Yes    Feed yourself? Yes  Do your laundry/housekeeping?: Yes  Manage your money, pay your bills and track your expenses?: Yes  Make your own meals?: Yes    Do your own shopping?: Yes    Previous Hospitalizations:   Any hospitalizations or ED visits within the last 12 months?: No      Advance Care Planning:   Living will: No    Durable POA for healthcare: No    Advanced directive: No      Cognitive Screening:   Provider or family/friend/caregiver concerned regarding cognition?: No    PREVENTIVE SCREENINGS      Cardiovascular Screening:    General: Screening Not Indicated and History Lipid Disorder      Diabetes Screening:     General: Screening Not Indicated and History Diabetes      Colorectal Cancer Screening:     General: Screening Current      Breast Cancer Screening:     General: Risks and Benefits Discussed      Cervical Cancer Screening:    General: Screening Not Indicated      Osteoporosis Screening:    General: Screening Current      Abdominal Aortic Aneurysm (AAA) Screening:        General: Screening Not Indicated      Lung Cancer Screening:     General: Screening Not Indicated      Hepatitis C Screening:    General: Screening Not Indicated    Screening, Brief Intervention, and Referral to Treatment (SBIRT)    Screening  Typical number of drinks in a day: 0  Typical number of drinks in a week: 0  Interpretation: Low risk drinking behavior. No results found. Physical Exam:     /68 (BP Location: Left arm, Patient Position: Sitting, Cuff Size: Adult)   Pulse 65   Temp 98.1 °F (36.7 °C) (Temporal)   Resp 16   Ht 5' 4" (1.626 m)   Wt 106 kg (234 lb)   SpO2 98%   BMI 40.17 kg/m²     Physical Exam  Constitutional:       General: She is not in acute distress. Appearance: Normal appearance.    HENT:      Head: Normocephalic and atraumatic. Right Ear: Tympanic membrane normal.      Left Ear: Tympanic membrane normal.      Nose: No congestion or rhinorrhea. Eyes:      Conjunctiva/sclera: Conjunctivae normal.      Pupils: Pupils are equal, round, and reactive to light. Neck:      Vascular: No carotid bruit. Cardiovascular:      Rate and Rhythm: Normal rate and regular rhythm. Pulses: no weak pulses          Dorsalis pedis pulses are 2+ on the right side and 2+ on the left side. Posterior tibial pulses are 2+ on the right side and 2+ on the left side. Heart sounds: No murmur heard. Pulmonary:      Effort: Pulmonary effort is normal. No respiratory distress. Breath sounds: Normal breath sounds. Abdominal:      Palpations: Abdomen is soft. Musculoskeletal:         General: Normal range of motion. Cervical back: Normal range of motion and neck supple. No muscular tenderness. Feet:      Right foot:      Skin integrity: No ulcer, skin breakdown, erythema, warmth, callus or dry skin. Left foot:      Skin integrity: No ulcer, skin breakdown, erythema, warmth, callus or dry skin. Lymphadenopathy:      Cervical: No cervical adenopathy. Skin:     General: Skin is warm. Capillary Refill: Capillary refill takes less than 2 seconds. Neurological:      General: No focal deficit present. Mental Status: She is alert and oriented to person, place, and time. Psychiatric:         Mood and Affect: Mood normal.        Patient's shoes and socks removed. Right Foot/Ankle   Right Foot Inspection  Skin Exam: skin normal and skin intact. No dry skin, no warmth, no callus, no erythema, no maceration, no abnormal color, no pre-ulcer, no ulcer and no callus. Toe Exam: ROM and strength within normal limits. Sensory   Vibration: intact  Proprioception: intact  Monofilament testing: intact    Vascular  Capillary refills: < 3 seconds  The right DP pulse is 2+.  The right PT pulse is 2+.     Right Toe  - Comprehensive Exam  Ecchymosis: none  Arch: normal  Hammertoes: absent  Claw Toes: absent  Swelling: none   Tenderness: none       Left Foot/Ankle  Left Foot Inspection  Skin Exam: skin normal and skin intact. No dry skin, no warmth, no erythema, no maceration, normal color, no pre-ulcer, no ulcer and no callus. Toe Exam: ROM and strength within normal limits. Sensory   Vibration: intact  Proprioception: intact  Monofilament testing: intact    Vascular  Capillary refills: < 3 seconds  The left DP pulse is 2+. The left PT pulse is 2+.      Left Toe  - Comprehensive Exam  Ecchymosis: none  Arch: normal  Hammertoes: absent  Claw toes: absent  Swelling: none   Tenderness: none       Assign Risk Category  No deformity present  No loss of protective sensation  No weak pulses  Risk: 0     Tamiko Delarosa PA-C

## 2023-10-16 NOTE — PATIENT INSTRUCTIONS
Assessment/plan:  1. Type 2 diabetes-stable with hemoglobin A1c of 7.0 on Januvia therapy. Patient has stopped metformin therapy because of diarrhea. Diabetic foot exam performed. Patient does continue to follow with ophthalmology in November yearly. 2.  Benign essential hypertension-stable on atenolol and amlodipine, no medication changes. 3.  Microalbuminuria-stable with glucose control. 4.  Hypothyroidism-stable with levothyroxine 75 mcg daily. 5.  Severe obesity-continue healthy diet and exercise efforts. 6.  Hyperlipidemia-stable on simvastatin 40 mg daily. 7.  Daytime somnolence-patient has normal thyroid levels and is not anemic. She does not have any shortness of breath or chest pains. Her symptoms are more being sleepy during the daytime and lack of restorative sleep. Would recommend consult with sleep medicine to rule out sleep apnea. 8.  Healthcare maintenance-annual Medicare wellness visit. Flu vaccine given today. Patient is up-to-date with pneumonia vaccine.

## 2023-11-13 ENCOUNTER — VBI (OUTPATIENT)
Dept: ADMINISTRATIVE | Facility: OTHER | Age: 78
End: 2023-11-13

## 2024-01-18 ENCOUNTER — CONSULT (OUTPATIENT)
Dept: FAMILY MEDICINE CLINIC | Facility: CLINIC | Age: 79
End: 2024-01-18
Payer: COMMERCIAL

## 2024-01-18 VITALS
WEIGHT: 231 LBS | BODY MASS INDEX: 39.44 KG/M2 | HEART RATE: 60 BPM | SYSTOLIC BLOOD PRESSURE: 120 MMHG | HEIGHT: 64 IN | RESPIRATION RATE: 16 BRPM | TEMPERATURE: 96.8 F | DIASTOLIC BLOOD PRESSURE: 70 MMHG

## 2024-01-18 DIAGNOSIS — I10 ESSENTIAL HYPERTENSION: ICD-10-CM

## 2024-01-18 DIAGNOSIS — E11.65 TYPE 2 DIABETES MELLITUS WITH HYPERGLYCEMIA, WITHOUT LONG-TERM CURRENT USE OF INSULIN (HCC): ICD-10-CM

## 2024-01-18 DIAGNOSIS — R15.9 INCONTINENCE OF FECES, UNSPECIFIED FECAL INCONTINENCE TYPE: ICD-10-CM

## 2024-01-18 DIAGNOSIS — E66.01 SEVERE OBESITY (BMI 35.0-35.9 WITH COMORBIDITY): ICD-10-CM

## 2024-01-18 DIAGNOSIS — E03.9 HYPOTHYROIDISM, UNSPECIFIED TYPE: ICD-10-CM

## 2024-01-18 DIAGNOSIS — D49.2 NEOPLASM OF SKIN OF FACE: ICD-10-CM

## 2024-01-18 DIAGNOSIS — E11.9 TYPE 2 DIABETES MELLITUS WITHOUT COMPLICATION, WITHOUT LONG-TERM CURRENT USE OF INSULIN (HCC): ICD-10-CM

## 2024-01-18 DIAGNOSIS — R80.9 MICROALBUMINURIA: ICD-10-CM

## 2024-01-18 DIAGNOSIS — Z01.818 PREOPERATIVE EVALUATION OF A MEDICAL CONDITION TO RULE OUT SURGICAL CONTRAINDICATIONS (TAR REQUIRED): Primary | ICD-10-CM

## 2024-01-18 DIAGNOSIS — E78.2 MIXED HYPERLIPIDEMIA: ICD-10-CM

## 2024-01-18 PROCEDURE — 99214 OFFICE O/P EST MOD 30 MIN: CPT | Performed by: PHYSICIAN ASSISTANT

## 2024-01-18 RX ORDER — MOXIFLOXACIN 5 MG/ML
SOLUTION/ DROPS OPHTHALMIC
COMMUNITY
Start: 2024-01-09

## 2024-01-18 RX ORDER — BROMFENAC 0.76 MG/ML
SOLUTION/ DROPS OPHTHALMIC
COMMUNITY
Start: 2024-01-09

## 2024-01-18 RX ORDER — LANCETS 28 GAUGE
EACH MISCELLANEOUS
Qty: 100 EACH | Refills: 5 | Status: SHIPPED | OUTPATIENT
Start: 2024-01-18

## 2024-01-18 RX ORDER — DIFLUPREDNATE OPHTHALMIC 0.5 MG/ML
EMULSION OPHTHALMIC
COMMUNITY
Start: 2024-01-09

## 2024-01-18 RX ORDER — OXYBUTYNIN CHLORIDE 5 MG/1
5 TABLET ORAL 3 TIMES DAILY
Qty: 90 TABLET | Refills: 2 | Status: SHIPPED | OUTPATIENT
Start: 2024-01-18

## 2024-01-18 RX ORDER — BLOOD-GLUCOSE METER
1 KIT MISCELLANEOUS DAILY
Qty: 100 EACH | Refills: 1 | Status: SHIPPED | OUTPATIENT
Start: 2024-01-18

## 2024-01-18 RX ORDER — ALBUTEROL SULFATE 90 UG/1
2 AEROSOL, METERED RESPIRATORY (INHALATION) EVERY 6 HOURS PRN
COMMUNITY
Start: 2023-12-22

## 2024-01-18 NOTE — PROGRESS NOTES
Name: Lamonte Toscano      : 1945      MRN: [id number]  Encounter Provider: Joesph Hagen PA-C  Encounter Date: 2024   Encounter department: [facility] PRIMARY CARE    Assessment & Plan     Patient Instructions   Assessment/plan:  1.  Preop consultation-patient is to have cataract procedure of the right eye performed with Dr. Buckley.  Patient is medically stable at this time.  2.  Type 2 diabetes with microalbuminuria-presently stable on Januvia 100 mg daily-stable with last hemoglobin A1c in October of 7.0.  3.  Hypertension-presently stable with amlodipine and atenolol, no medication changes.  4.  Hyperlipidemia-stable on simvastatin 40 mg daily.  5.  Hypothyroidism-stable with levothyroxine 75 mcg daily.  6.  Skin neoplasm of the left face-suspect squamous cell lesion.  Would recommend further evaluation by plastic surgery for biopsy.  7.  Incontinence-patient is interested in trying medication therapy.  Will try oxybutynin 5 mg initially once daily but may take up to 3 times daily as needed.  Possible side effects were reviewed with patient.    1. Preoperative evaluation of a medical condition to rule out surgical contraindications (TAR required)    2. Type 2 diabetes mellitus with hyperglycemia, without long-term current use of insulin (HCC)    3. Hypothyroidism, unspecified type    4. Essential hypertension    5. Mixed hyperlipidemia    6. Microalbuminuria    7. Incontinence of feces, unspecified fecal incontinence type  -     oxybutynin (DITROPAN) 5 mg tablet; Take 1 tablet (5 mg total) by mouth 3 (three) times a day    8. Severe obesity (BMI 35.0-35.9 with comorbidity)     9. Type 2 diabetes mellitus without complication, without long-term current use of insulin (HCC)  -     glucose blood (FREESTYLE LITE) test strip; Use 1 each daily  -     Lancets (freestyle) lancets; Use as instructed    10. Neoplasm of skin of face  -     Ambulatory Referral to Plastic Surgery; Future            Subjective      HPI: This is a 78-year-old female who presents to the office for preop consultation for cataract surgery to be performed on 24 January, 2024.  She has a history of type 2 diabetes which has been stable with Januvia therapy.  She also has a history of hypertension which has been stable on atenolol and amlodipine.  Cholesterol has been stable with simvastatin.  She has not had any new signs of infection or chest pains or shortness of breath.      Review of Systems   Constitutional:  Negative for chills, fatigue and fever.   HENT:  Negative for congestion, ear pain and sinus pressure.    Eyes:  Negative for visual disturbance.   Respiratory:  Negative for cough, chest tightness and shortness of breath.    Cardiovascular:  Negative for chest pain and palpitations.   Gastrointestinal:  Negative for diarrhea, nausea and vomiting.   Endocrine: Negative for polyuria.   Genitourinary:  Negative for dysuria and frequency.   Musculoskeletal:  Negative for arthralgias and myalgias.   Skin:  Negative for pallor and rash.   Neurological:  Negative for dizziness, weakness, light-headedness, numbness and headaches.   Psychiatric/Behavioral:  Negative for agitation, behavioral problems and sleep disturbance.    All other systems reviewed and are negative.      Current Outpatient Medications on File Prior to Visit   Medication Sig   • albuterol (PROVENTIL HFA,VENTOLIN HFA) 90 mcg/act inhaler Inhale 2 puffs every 6 (six) hours as needed   • amLODIPine (NORVASC) 5 mg tablet TAKE 1 TABLET BY MOUTH EVERY DAY   • atenolol (TENORMIN) 25 mg tablet TAKE 1 TABLET BY MOUTH EVERYDAY AT BEDTIME   • Blood Glucose Monitoring Suppl (FreeStyle Freedom Lite) w/Device KIT Use daily   • BromSite 0.075 % SOLN    • Cholecalciferol 125 MCG (5000 UT) capsule Take 1,000 Units by mouth daily   • citalopram (CeleXA) 20 mg tablet TAKE 1 TABLET BY MOUTH EVERY DAY   • Diclofenac Sodium (VOLTAREN) 1 % Apply 2 g topically in the morning and 2 g  "at noon and 2 g in the evening and 2 g before bedtime. (Patient taking differently: Apply 2 g topically if needed)   • Difluprednate 0.05 % EMUL    • furosemide (LASIX) 40 mg tablet Take 1 tablet (40 mg total) by mouth every 3 (three) days 1 tab my mouth as needed every 3rd day for increase edema   • IRON CR PO Take by mouth in the morning   • Januvia 100 MG tablet TAKE 1 TABLET BY MOUTH EVERY DAY   • levothyroxine 75 mcg tablet TAKE 1 TABLET BY MOUTH EVERY DAY   • moxifloxacin (VIGAMOX) 0.5 % ophthalmic solution    • Multiple Vitamin (MULTIVITAMINS PO) Take 1 capsule by mouth daily   • simvastatin (ZOCOR) 40 mg tablet TAKE 1 TABLET BY MOUTH EVERY DAY   • [DISCONTINUED] glucose blood (FREESTYLE LITE) test strip Use 1 each daily   • [DISCONTINUED] methocarbamol (ROBAXIN) 500 mg tablet Take 500 mg by mouth in the morning and 500 mg at noon and 500 mg in the evening and 500 mg before bedtime. 1 tab every 6 hrs as needed for muscle spasms. (Patient not taking: Reported on 1/18/2024)       Objective     /70   Pulse 60   Temp (!) 96.8 °F (36 °C)   Resp 16   Ht 5' 4\" (1.626 m)   Wt 105 kg (231 lb)   BMI 39.65 kg/m²     Physical Exam  Vitals and nursing note reviewed.   Constitutional:       General: She is not in acute distress.     Appearance: She is well-developed.   HENT:      Head: Normocephalic and atraumatic.      Right Ear: External ear normal.      Left Ear: External ear normal.      Nose: Nose normal.      Mouth/Throat:      Pharynx: No oropharyngeal exudate.   Eyes:      Conjunctiva/sclera: Conjunctivae normal.      Pupils: Pupils are equal, round, and reactive to light.   Neck:      Thyroid: No thyromegaly.      Trachea: No tracheal deviation.   Cardiovascular:      Rate and Rhythm: Normal rate and regular rhythm.      Heart sounds: Normal heart sounds. No murmur heard.     No friction rub.   Pulmonary:      Effort: Pulmonary effort is normal. No respiratory distress.      Breath sounds: Normal " breath sounds. No wheezing or rales.   Abdominal:      General: Bowel sounds are normal. There is no distension.      Palpations: Abdomen is soft.      Tenderness: There is no abdominal tenderness. There is no guarding or rebound.   Musculoskeletal:         General: No tenderness. Normal range of motion.      Cervical back: Normal range of motion and neck supple.   Lymphadenopathy:      Cervical: No cervical adenopathy.   Skin:     General: Skin is warm and dry.      Findings: No erythema or rash.   Neurological:      Mental Status: She is alert and oriented to person, place, and time.      Cranial Nerves: No cranial nerve deficit.      Coordination: Coordination normal.   Psychiatric:         Behavior: Behavior normal.         Thought Content: Thought content normal.       Joesph Hagen PA-C

## 2024-01-18 NOTE — PATIENT INSTRUCTIONS
Assessment/plan:  1.  Preop consultation-patient is to have cataract procedure of the right eye performed with Dr. Buckley.  Patient is medically stable at this time.  2.  Type 2 diabetes with microalbuminuria-presently stable on Januvia 100 mg daily-stable with last hemoglobin A1c in October of 7.0.  3.  Hypertension-presently stable with amlodipine and atenolol, no medication changes.  4.  Hyperlipidemia-stable on simvastatin 40 mg daily.  5.  Hypothyroidism-stable with levothyroxine 75 mcg daily.  6.  Skin neoplasm of the left face-suspect squamous cell lesion.  Would recommend further evaluation by plastic surgery for biopsy.  7.  Incontinence-patient is interested in trying medication therapy.  Will try oxybutynin 5 mg initially once daily but may take up to 3 times daily as needed.  Possible side effects were reviewed with patient.

## 2024-02-28 ENCOUNTER — TELEPHONE (OUTPATIENT)
Dept: PLASTIC SURGERY | Facility: CLINIC | Age: 79
End: 2024-02-28

## 2024-03-02 DIAGNOSIS — I10 ESSENTIAL HYPERTENSION: ICD-10-CM

## 2024-03-02 DIAGNOSIS — E11.9 TYPE 2 DIABETES MELLITUS WITHOUT COMPLICATION, WITHOUT LONG-TERM CURRENT USE OF INSULIN (HCC): ICD-10-CM

## 2024-03-02 DIAGNOSIS — F32.9 REACTIVE DEPRESSION: ICD-10-CM

## 2024-03-03 RX ORDER — SITAGLIPTIN 100 MG/1
TABLET, FILM COATED ORAL
Qty: 90 TABLET | Refills: 1 | Status: SHIPPED | OUTPATIENT
Start: 2024-03-03

## 2024-03-03 RX ORDER — CITALOPRAM 20 MG/1
TABLET ORAL
Qty: 90 TABLET | Refills: 1 | Status: SHIPPED | OUTPATIENT
Start: 2024-03-03

## 2024-03-03 RX ORDER — ATENOLOL 25 MG/1
TABLET ORAL
Qty: 90 TABLET | Refills: 1 | Status: SHIPPED | OUTPATIENT
Start: 2024-03-03

## 2024-03-28 DIAGNOSIS — I10 ESSENTIAL HYPERTENSION: ICD-10-CM

## 2024-03-28 DIAGNOSIS — E03.9 HYPOTHYROIDISM, UNSPECIFIED TYPE: ICD-10-CM

## 2024-03-28 RX ORDER — LEVOTHYROXINE SODIUM 0.07 MG/1
TABLET ORAL
Qty: 90 TABLET | Refills: 1 | Status: SHIPPED | OUTPATIENT
Start: 2024-03-28

## 2024-03-28 RX ORDER — AMLODIPINE BESYLATE 5 MG/1
TABLET ORAL
Qty: 90 TABLET | Refills: 1 | Status: SHIPPED | OUTPATIENT
Start: 2024-03-28

## 2024-04-09 ENCOUNTER — APPOINTMENT (OUTPATIENT)
Dept: LAB | Facility: CLINIC | Age: 79
End: 2024-04-09
Payer: COMMERCIAL

## 2024-04-09 DIAGNOSIS — R80.9 MICROALBUMINURIA: ICD-10-CM

## 2024-04-09 DIAGNOSIS — E11.9 TYPE 2 DIABETES MELLITUS WITHOUT COMPLICATION, WITHOUT LONG-TERM CURRENT USE OF INSULIN (HCC): ICD-10-CM

## 2024-04-09 DIAGNOSIS — I10 ESSENTIAL HYPERTENSION: ICD-10-CM

## 2024-04-09 DIAGNOSIS — E66.01 SEVERE OBESITY (BMI 35.0-35.9 WITH COMORBIDITY) (HCC): ICD-10-CM

## 2024-04-09 DIAGNOSIS — E03.9 HYPOTHYROIDISM, UNSPECIFIED TYPE: ICD-10-CM

## 2024-04-09 LAB
ALBUMIN SERPL BCP-MCNC: 4.1 G/DL (ref 3.5–5)
ALP SERPL-CCNC: 57 U/L (ref 34–104)
ALT SERPL W P-5'-P-CCNC: 15 U/L (ref 7–52)
ANION GAP SERPL CALCULATED.3IONS-SCNC: 8 MMOL/L (ref 4–13)
AST SERPL W P-5'-P-CCNC: 21 U/L (ref 13–39)
BASOPHILS # BLD AUTO: 0.04 THOUSANDS/ÂΜL (ref 0–0.1)
BASOPHILS NFR BLD AUTO: 1 % (ref 0–1)
BILIRUB SERPL-MCNC: 0.45 MG/DL (ref 0.2–1)
BUN SERPL-MCNC: 20 MG/DL (ref 5–25)
CALCIUM SERPL-MCNC: 9.2 MG/DL (ref 8.4–10.2)
CHLORIDE SERPL-SCNC: 101 MMOL/L (ref 96–108)
CHOLEST SERPL-MCNC: 160 MG/DL
CO2 SERPL-SCNC: 31 MMOL/L (ref 21–32)
CREAT SERPL-MCNC: 0.55 MG/DL (ref 0.6–1.3)
CREAT UR-MCNC: 128.5 MG/DL
EOSINOPHIL # BLD AUTO: 0.14 THOUSAND/ÂΜL (ref 0–0.61)
EOSINOPHIL NFR BLD AUTO: 2 % (ref 0–6)
ERYTHROCYTE [DISTWIDTH] IN BLOOD BY AUTOMATED COUNT: 13.8 % (ref 11.6–15.1)
EST. AVERAGE GLUCOSE BLD GHB EST-MCNC: 151 MG/DL
GFR SERPL CREATININE-BSD FRML MDRD: 88 ML/MIN/1.73SQ M
GLUCOSE P FAST SERPL-MCNC: 121 MG/DL (ref 65–99)
HBA1C MFR BLD: 6.9 %
HCT VFR BLD AUTO: 39 % (ref 34.8–46.1)
HDLC SERPL-MCNC: 57 MG/DL
HGB BLD-MCNC: 12.7 G/DL (ref 11.5–15.4)
IMM GRANULOCYTES # BLD AUTO: 0.01 THOUSAND/UL (ref 0–0.2)
IMM GRANULOCYTES NFR BLD AUTO: 0 % (ref 0–2)
LDLC SERPL CALC-MCNC: 80 MG/DL (ref 0–100)
LYMPHOCYTES # BLD AUTO: 1.58 THOUSANDS/ÂΜL (ref 0.6–4.47)
LYMPHOCYTES NFR BLD AUTO: 26 % (ref 14–44)
MCH RBC QN AUTO: 31.4 PG (ref 26.8–34.3)
MCHC RBC AUTO-ENTMCNC: 32.6 G/DL (ref 31.4–37.4)
MCV RBC AUTO: 97 FL (ref 82–98)
MICROALBUMIN UR-MCNC: 52.1 MG/L
MICROALBUMIN/CREAT 24H UR: 41 MG/G CREATININE (ref 0–30)
MONOCYTES # BLD AUTO: 0.48 THOUSAND/ÂΜL (ref 0.17–1.22)
MONOCYTES NFR BLD AUTO: 8 % (ref 4–12)
NEUTROPHILS # BLD AUTO: 3.9 THOUSANDS/ÂΜL (ref 1.85–7.62)
NEUTS SEG NFR BLD AUTO: 63 % (ref 43–75)
NRBC BLD AUTO-RTO: 0 /100 WBCS
PLATELET # BLD AUTO: 351 THOUSANDS/UL (ref 149–390)
PMV BLD AUTO: 10.6 FL (ref 8.9–12.7)
POTASSIUM SERPL-SCNC: 4.2 MMOL/L (ref 3.5–5.3)
PROT SERPL-MCNC: 7.2 G/DL (ref 6.4–8.4)
RBC # BLD AUTO: 4.04 MILLION/UL (ref 3.81–5.12)
SODIUM SERPL-SCNC: 140 MMOL/L (ref 135–147)
TRIGL SERPL-MCNC: 114 MG/DL
TSH SERPL DL<=0.05 MIU/L-ACNC: 1.32 UIU/ML (ref 0.45–4.5)
WBC # BLD AUTO: 6.15 THOUSAND/UL (ref 4.31–10.16)

## 2024-04-09 PROCEDURE — 83036 HEMOGLOBIN GLYCOSYLATED A1C: CPT

## 2024-04-09 PROCEDURE — 36415 COLL VENOUS BLD VENIPUNCTURE: CPT

## 2024-04-09 PROCEDURE — 84443 ASSAY THYROID STIM HORMONE: CPT

## 2024-04-09 PROCEDURE — 82570 ASSAY OF URINE CREATININE: CPT

## 2024-04-09 PROCEDURE — 80061 LIPID PANEL: CPT

## 2024-04-09 PROCEDURE — 82043 UR ALBUMIN QUANTITATIVE: CPT

## 2024-04-09 PROCEDURE — 85025 COMPLETE CBC W/AUTO DIFF WBC: CPT

## 2024-04-09 PROCEDURE — 80053 COMPREHEN METABOLIC PANEL: CPT

## 2024-04-10 ENCOUNTER — RA CDI HCC (OUTPATIENT)
Dept: OTHER | Facility: HOSPITAL | Age: 79
End: 2024-04-10

## 2024-04-10 NOTE — PROGRESS NOTES
HCC coding opportunities          Chart Reviewed number of suggestions sent to Provider: 3   E11.51  E11.40   E11.29, R80.9    Patients Insurance     Medicare Insurance: [employer]

## 2024-04-16 ENCOUNTER — OFFICE VISIT (OUTPATIENT)
Dept: FAMILY MEDICINE CLINIC | Facility: CLINIC | Age: 79
End: 2024-04-16
Payer: COMMERCIAL

## 2024-04-16 VITALS
HEART RATE: 72 BPM | HEIGHT: 64 IN | WEIGHT: 230.8 LBS | BODY MASS INDEX: 39.4 KG/M2 | OXYGEN SATURATION: 94 % | SYSTOLIC BLOOD PRESSURE: 126 MMHG | DIASTOLIC BLOOD PRESSURE: 70 MMHG

## 2024-04-16 DIAGNOSIS — E66.01 SEVERE OBESITY (BMI 35.0-35.9 WITH COMORBIDITY) (HCC): ICD-10-CM

## 2024-04-16 DIAGNOSIS — E03.9 HYPOTHYROIDISM, UNSPECIFIED TYPE: ICD-10-CM

## 2024-04-16 DIAGNOSIS — E78.2 MIXED HYPERLIPIDEMIA: ICD-10-CM

## 2024-04-16 DIAGNOSIS — R80.9 MICROALBUMINURIA: ICD-10-CM

## 2024-04-16 DIAGNOSIS — F33.9 DEPRESSION, RECURRENT (HCC): ICD-10-CM

## 2024-04-16 DIAGNOSIS — E11.65 TYPE 2 DIABETES MELLITUS WITH HYPERGLYCEMIA, WITHOUT LONG-TERM CURRENT USE OF INSULIN (HCC): Primary | ICD-10-CM

## 2024-04-16 DIAGNOSIS — I10 ESSENTIAL HYPERTENSION: ICD-10-CM

## 2024-04-16 DIAGNOSIS — F32.9 REACTIVE DEPRESSION: ICD-10-CM

## 2024-04-16 PROCEDURE — 99214 OFFICE O/P EST MOD 30 MIN: CPT | Performed by: PHYSICIAN ASSISTANT

## 2024-04-16 PROCEDURE — G2211 COMPLEX E/M VISIT ADD ON: HCPCS | Performed by: PHYSICIAN ASSISTANT

## 2024-04-16 RX ORDER — CITALOPRAM 40 MG/1
40 TABLET ORAL DAILY
Qty: 90 TABLET | Refills: 3 | Status: SHIPPED | OUTPATIENT
Start: 2024-04-16

## 2024-04-16 RX ORDER — DORZOLAMIDE HYDROCHLORIDE AND TIMOLOL MALEATE 20; 5 MG/ML; MG/ML
SOLUTION/ DROPS OPHTHALMIC
COMMUNITY
Start: 2024-03-27

## 2024-04-16 NOTE — PATIENT INSTRUCTIONS
Assessment/plan:  1.  Type 2 diabetes-hemoglobin A1c is stable at 6.9 with Januvia therapy.  2.  Hypertension-presently stable with amlodipine and atenolol, no medication changes.  3.  Mixed hyperlipidemia-stable on simvastatin 40 mg daily.  4.  Microalbuminuria-level has been stable with glucose control.  ACE inhibitor therapy was discussed with patient previously.  Hesitant to start new medication.  5.  Severe obesity-patient continues healthy dietary efforts.  Encourage regular walking and movement  6.  Hypothyroidism-stable with levothyroxine 75 mcg daily.  7.  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  She does seem to be having a bit of an exacerbation of symptoms and would like to try going up to citalopram 40 mg daily.  Encouraged to follow-up in 2 to 4 weeks if symptoms or not improving.  Otherwise we will follow-up in 6 months for AWV.

## 2024-04-16 NOTE — PROGRESS NOTES
Name: Lamonte Toscano      : 1945      MRN: [id number]  Encounter Provider: Joesph Hagen PA-C  Encounter Date: 2024   Encounter department: [facility]    Assessment & Plan     Patient Instructions   Assessment/plan:  1.  Type 2 diabetes-hemoglobin A1c is stable at 6.9 with Januvia therapy.  2.  Hypertension-presently stable with amlodipine and atenolol, no medication changes.  3.  Mixed hyperlipidemia-stable on simvastatin 40 mg daily.  4.  Microalbuminuria-level has been stable with glucose control.  ACE inhibitor therapy was discussed with patient previously.  Hesitant to start new medication.  5.  Severe obesity-patient continues healthy dietary efforts.  Encourage regular walking and movement  6.  Hypothyroidism-stable with levothyroxine 75 mcg daily.  7.  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  She does seem to be having a bit of an exacerbation of symptoms and would like to try going up to citalopram 40 mg daily.  Encouraged to follow-up in 2 to 4 weeks if symptoms or not improving.  Otherwise we will follow-up in 6 months for AWV.    1. Type 2 diabetes mellitus with hyperglycemia, without long-term current use of insulin (HCC)  -     Albumin / creatinine urine ratio; Future; Expected date: 10/13/2024  -     Comprehensive metabolic panel; Future; Expected date: 10/13/2024  -     Hemoglobin A1C; Future; Expected date: 10/13/2024  -     CBC and differential; Future; Expected date: 10/13/2024  -     Lipid Panel with Direct LDL reflex; Future; Expected date: 10/13/2024  -     TSH, 3rd generation with Free T4 reflex; Future; Expected date: 10/13/2024    2. Essential hypertension  -     Albumin / creatinine urine ratio; Future; Expected date: 10/13/2024  -     Comprehensive metabolic panel; Future; Expected date: 10/13/2024  -     Hemoglobin A1C; Future; Expected date: 10/13/2024  -     CBC and differential; Future; Expected date: 10/13/2024  -     Lipid Panel  with Direct LDL reflex; Future; Expected date: 10/13/2024  -     TSH, 3rd generation with Free T4 reflex; Future; Expected date: 10/13/2024    3. Mixed hyperlipidemia  -     Albumin / creatinine urine ratio; Future; Expected date: 10/13/2024  -     Comprehensive metabolic panel; Future; Expected date: 10/13/2024  -     Hemoglobin A1C; Future; Expected date: 10/13/2024  -     CBC and differential; Future; Expected date: 10/13/2024  -     Lipid Panel with Direct LDL reflex; Future; Expected date: 10/13/2024  -     TSH, 3rd generation with Free T4 reflex; Future; Expected date: 10/13/2024    4. Microalbuminuria  -     Albumin / creatinine urine ratio; Future; Expected date: 10/13/2024  -     Comprehensive metabolic panel; Future; Expected date: 10/13/2024  -     Hemoglobin A1C; Future; Expected date: 10/13/2024  -     CBC and differential; Future; Expected date: 10/13/2024  -     Lipid Panel with Direct LDL reflex; Future; Expected date: 10/13/2024  -     TSH, 3rd generation with Free T4 reflex; Future; Expected date: 10/13/2024    5. Severe obesity (BMI 35.0-35.9 with comorbidity) (HCC)  -     Albumin / creatinine urine ratio; Future; Expected date: 10/13/2024  -     Comprehensive metabolic panel; Future; Expected date: 10/13/2024  -     Hemoglobin A1C; Future; Expected date: 10/13/2024  -     CBC and differential; Future; Expected date: 10/13/2024  -     Lipid Panel with Direct LDL reflex; Future; Expected date: 10/13/2024  -     TSH, 3rd generation with Free T4 reflex; Future; Expected date: 10/13/2024    6. Hypothyroidism, unspecified type  -     Albumin / creatinine urine ratio; Future; Expected date: 10/13/2024  -     Comprehensive metabolic panel; Future; Expected date: 10/13/2024  -     Hemoglobin A1C; Future; Expected date: 10/13/2024  -     CBC and differential; Future; Expected date: 10/13/2024  -     Lipid Panel with Direct LDL reflex; Future; Expected date: 10/13/2024  -     TSH, 3rd generation with Free  T4 reflex; Future; Expected date: 10/13/2024    7. Depression, recurrent (HCC)    8. Reactive depression  -     citalopram (CeleXA) 40 mg tablet; Take 1 tablet (40 mg total) by mouth daily           Subjective      HPI: This is an 79-year-old female who presents to the office for follow-up of chronic health conditions including type 2 diabetes, hypertension, and increased lipids.  She also has a history of hypothyroidism which has been stable with 75 mcg of levothyroxine daily.  Her blood pressure has been stable with amlodipine and atenolol.  Her diabetes has been controlled with Januvia therapy.  She did have cataract procedure performed with her eye doctor in January but has been having some difficulty with eye pressures since then.  She has been doing drops 5 times daily and continues to follow with the eye doctor regularly.  She does have a history of depression and has been using citalopram 20 mg daily.  She admits that she has had some increased anxieties and symptoms at home recently.  She is going through some things with her daughter and would like to discuss increasing medication.      Review of Systems   Constitutional:  Negative for chills, fatigue and fever.   HENT:  Negative for congestion, ear pain and sinus pressure.    Eyes:  Negative for visual disturbance.   Respiratory:  Negative for cough, chest tightness and shortness of breath.    Cardiovascular:  Negative for chest pain and palpitations.   Gastrointestinal:  Negative for diarrhea, nausea and vomiting.   Endocrine: Negative for polyuria.   Genitourinary:  Negative for dysuria and frequency.   Musculoskeletal:  Negative for arthralgias and myalgias.   Skin:  Negative for pallor and rash.   Neurological:  Negative for dizziness, weakness, light-headedness, numbness and headaches.   Psychiatric/Behavioral:  Negative for agitation, behavioral problems and sleep disturbance.    All other systems reviewed and are negative.      Current Outpatient  "Medications on File Prior to Visit   Medication Sig   • albuterol (PROVENTIL HFA,VENTOLIN HFA) 90 mcg/act inhaler Inhale 2 puffs every 6 (six) hours as needed   • amLODIPine (NORVASC) 5 mg tablet TAKE 1 TABLET BY MOUTH EVERY DAY   • atenolol (TENORMIN) 25 mg tablet TAKE 1 TABLET BY MOUTH EVERYDAY AT BEDTIME   • Blood Glucose Monitoring Suppl (FreeStyle Freedom Lite) w/Device KIT Use daily   • BromSite 0.075 % SOLN    • Cholecalciferol 125 MCG (5000 UT) capsule Take 1,000 Units by mouth daily   • Diclofenac Sodium (VOLTAREN) 1 % Apply 2 g topically in the morning and 2 g at noon and 2 g in the evening and 2 g before bedtime. (Patient taking differently: Apply 2 g topically if needed)   • Difluprednate 0.05 % EMUL    • dorzolamide-timolol (COSOPT) 2-0.5 % ophthalmic solution instill 1 drop into both eyes twice a day   • furosemide (LASIX) 40 mg tablet Take 1 tablet (40 mg total) by mouth every 3 (three) days 1 tab my mouth as needed every 3rd day for increase edema   • glucose blood (FREESTYLE LITE) test strip Use 1 each daily   • IRON CR PO Take by mouth in the morning   • Januvia 100 MG tablet TAKE 1 TABLET BY MOUTH EVERY DAY   • Lancets (freestyle) lancets Use as instructed   • levothyroxine 75 mcg tablet TAKE 1 TABLET BY MOUTH EVERY DAY   • moxifloxacin (VIGAMOX) 0.5 % ophthalmic solution    • Multiple Vitamin (MULTIVITAMINS PO) Take 1 capsule by mouth daily   • oxybutynin (DITROPAN) 5 mg tablet Take 1 tablet (5 mg total) by mouth 3 (three) times a day   • simvastatin (ZOCOR) 40 mg tablet TAKE 1 TABLET BY MOUTH EVERY DAY   • [DISCONTINUED] citalopram (CeleXA) 20 mg tablet TAKE 1 TABLET BY MOUTH EVERY DAY       Objective     /70 (BP Location: Left arm, Patient Position: Sitting, Cuff Size: Standard)   Pulse 72   Ht 5' 4\" (1.626 m)   Wt 105 kg (230 lb 12.8 oz)   SpO2 94%   BMI 39.62 kg/m²     Physical Exam  Vitals and nursing note reviewed.   Constitutional:       General: She is not in acute distress.    "  Appearance: She is well-developed.   HENT:      Head: Normocephalic and atraumatic.      Right Ear: External ear normal.      Left Ear: External ear normal.      Nose: Nose normal.      Mouth/Throat:      Pharynx: No oropharyngeal exudate.   Eyes:      Conjunctiva/sclera: Conjunctivae normal.      Pupils: Pupils are equal, round, and reactive to light.   Neck:      Thyroid: No thyromegaly.      Trachea: No tracheal deviation.   Cardiovascular:      Rate and Rhythm: Normal rate and regular rhythm.      Pulses: no weak pulses.           Dorsalis pedis pulses are 2+ on the right side and 2+ on the left side.        Posterior tibial pulses are 2+ on the right side and 2+ on the left side.      Heart sounds: Normal heart sounds. No murmur heard.     No friction rub.   Pulmonary:      Effort: Pulmonary effort is normal. No respiratory distress.      Breath sounds: Normal breath sounds. No wheezing or rales.   Abdominal:      General: Bowel sounds are normal. There is no distension.      Palpations: Abdomen is soft.      Tenderness: There is no abdominal tenderness. There is no guarding or rebound.   Musculoskeletal:         General: No tenderness. Normal range of motion.      Cervical back: Normal range of motion and neck supple.   Feet:      Right foot:      Skin integrity: No ulcer, skin breakdown, erythema, warmth, callus or dry skin.      Left foot:      Skin integrity: No ulcer, skin breakdown, erythema, warmth, callus or dry skin.   Lymphadenopathy:      Cervical: No cervical adenopathy.   Skin:     General: Skin is warm and dry.      Findings: No erythema or rash.   Neurological:      Mental Status: She is alert and oriented to person, place, and time.      Cranial Nerves: No cranial nerve deficit.      Coordination: Coordination normal.   Psychiatric:         Behavior: Behavior normal.         Thought Content: Thought content normal.     Patient's shoes and socks removed.    Right Foot/Ankle   Right Foot  Inspection  Skin Exam: skin normal and skin intact. No dry skin, no warmth, no callus, no erythema, no maceration, no abnormal color, no pre-ulcer, no ulcer and no callus.     Toe Exam: ROM and strength within normal limits.     Sensory   Vibration: intact  Proprioception: intact  Monofilament testing: intact    Vascular  Capillary refills: < 3 seconds  The right DP pulse is 2+. The right PT pulse is 2+.     Right Toe  - Comprehensive Exam  Ecchymosis: none  Arch: normal  Hammertoes: absent  Claw Toes: absent  Swelling: none   Tenderness: none       Left Foot/Ankle  Left Foot Inspection  Skin Exam: skin normal and skin intact. No dry skin, no warmth, no erythema, no maceration, normal color, no pre-ulcer, no ulcer and no callus.     Toe Exam: ROM and strength within normal limits.     Sensory   Vibration: intact  Proprioception: intact  Monofilament testing: intact    Vascular  Capillary refills: < 3 seconds  The left DP pulse is 2+. The left PT pulse is 2+.     Left Toe  - Comprehensive Exam  Ecchymosis: none  Arch: normal  Hammertoes: absent  Claw toes: absent  Swelling: none   Tenderness: none       Assign Risk Category  No deformity present  No loss of protective sensation  No weak pulses  Risk: 0     Joesph Hagen PA-C

## 2024-05-03 DIAGNOSIS — R15.9 INCONTINENCE OF FECES, UNSPECIFIED FECAL INCONTINENCE TYPE: ICD-10-CM

## 2024-05-03 DIAGNOSIS — E78.2 MIXED HYPERLIPIDEMIA: ICD-10-CM

## 2024-05-03 RX ORDER — OXYBUTYNIN CHLORIDE 5 MG/1
5 TABLET ORAL 3 TIMES DAILY
Qty: 90 TABLET | Refills: 1 | Status: SHIPPED | OUTPATIENT
Start: 2024-05-03

## 2024-05-03 RX ORDER — SIMVASTATIN 40 MG
TABLET ORAL
Qty: 90 TABLET | Refills: 1 | Status: SHIPPED | OUTPATIENT
Start: 2024-05-03

## 2024-07-07 DIAGNOSIS — I10 ESSENTIAL HYPERTENSION: ICD-10-CM

## 2024-07-07 RX ORDER — ATENOLOL 25 MG/1
TABLET ORAL
Qty: 90 TABLET | Refills: 1 | Status: SHIPPED | OUTPATIENT
Start: 2024-07-07

## 2024-07-10 DIAGNOSIS — R15.9 INCONTINENCE OF FECES, UNSPECIFIED FECAL INCONTINENCE TYPE: ICD-10-CM

## 2024-07-10 RX ORDER — OXYBUTYNIN CHLORIDE 5 MG/1
5 TABLET ORAL 3 TIMES DAILY
Qty: 90 TABLET | Refills: 1 | Status: SHIPPED | OUTPATIENT
Start: 2024-07-10

## 2024-09-20 DIAGNOSIS — R15.9 INCONTINENCE OF FECES, UNSPECIFIED FECAL INCONTINENCE TYPE: ICD-10-CM

## 2024-09-20 RX ORDER — OXYBUTYNIN CHLORIDE 5 MG/1
5 TABLET ORAL 3 TIMES DAILY
Qty: 90 TABLET | Refills: 1 | Status: SHIPPED | OUTPATIENT
Start: 2024-09-20

## 2024-09-22 DIAGNOSIS — E11.9 TYPE 2 DIABETES MELLITUS WITHOUT COMPLICATION, WITHOUT LONG-TERM CURRENT USE OF INSULIN (HCC): ICD-10-CM

## 2024-09-23 RX ORDER — SITAGLIPTIN 100 MG/1
TABLET, FILM COATED ORAL
Qty: 90 TABLET | Refills: 1 | Status: SHIPPED | OUTPATIENT
Start: 2024-09-23

## 2024-10-16 ENCOUNTER — APPOINTMENT (OUTPATIENT)
Dept: LAB | Facility: CLINIC | Age: 79
End: 2024-10-16
Payer: COMMERCIAL

## 2024-10-16 DIAGNOSIS — E11.65 TYPE 2 DIABETES MELLITUS WITH HYPERGLYCEMIA, WITHOUT LONG-TERM CURRENT USE OF INSULIN (HCC): ICD-10-CM

## 2024-10-16 DIAGNOSIS — I10 ESSENTIAL HYPERTENSION: ICD-10-CM

## 2024-10-16 DIAGNOSIS — E78.2 MIXED HYPERLIPIDEMIA: ICD-10-CM

## 2024-10-16 DIAGNOSIS — R80.9 MICROALBUMINURIA: ICD-10-CM

## 2024-10-16 DIAGNOSIS — E03.9 HYPOTHYROIDISM, UNSPECIFIED TYPE: ICD-10-CM

## 2024-10-16 DIAGNOSIS — E66.01 SEVERE OBESITY (BMI 35.0-35.9 WITH COMORBIDITY) (HCC): ICD-10-CM

## 2024-10-16 LAB
ALBUMIN SERPL BCG-MCNC: 4.3 G/DL (ref 3.5–5)
ALP SERPL-CCNC: 56 U/L (ref 34–104)
ALT SERPL W P-5'-P-CCNC: 10 U/L (ref 7–52)
ANION GAP SERPL CALCULATED.3IONS-SCNC: 10 MMOL/L (ref 4–13)
AST SERPL W P-5'-P-CCNC: 19 U/L (ref 13–39)
BASOPHILS # BLD AUTO: 0.06 THOUSANDS/ΜL (ref 0–0.1)
BASOPHILS NFR BLD AUTO: 1 % (ref 0–1)
BILIRUB SERPL-MCNC: 0.5 MG/DL (ref 0.2–1)
BUN SERPL-MCNC: 18 MG/DL (ref 5–25)
CALCIUM SERPL-MCNC: 9.5 MG/DL (ref 8.4–10.2)
CHLORIDE SERPL-SCNC: 100 MMOL/L (ref 96–108)
CHOLEST SERPL-MCNC: 175 MG/DL
CO2 SERPL-SCNC: 30 MMOL/L (ref 21–32)
CREAT SERPL-MCNC: 0.61 MG/DL (ref 0.6–1.3)
CREAT UR-MCNC: 93.4 MG/DL
EOSINOPHIL # BLD AUTO: 0.23 THOUSAND/ΜL (ref 0–0.61)
EOSINOPHIL NFR BLD AUTO: 3 % (ref 0–6)
ERYTHROCYTE [DISTWIDTH] IN BLOOD BY AUTOMATED COUNT: 13.8 % (ref 11.6–15.1)
EST. AVERAGE GLUCOSE BLD GHB EST-MCNC: 148 MG/DL
GFR SERPL CREATININE-BSD FRML MDRD: 85 ML/MIN/1.73SQ M
GLUCOSE P FAST SERPL-MCNC: 105 MG/DL (ref 65–99)
HBA1C MFR BLD: 6.8 %
HCT VFR BLD AUTO: 39.9 % (ref 34.8–46.1)
HDLC SERPL-MCNC: 64 MG/DL
HGB BLD-MCNC: 13.1 G/DL (ref 11.5–15.4)
IMM GRANULOCYTES # BLD AUTO: 0.02 THOUSAND/UL (ref 0–0.2)
IMM GRANULOCYTES NFR BLD AUTO: 0 % (ref 0–2)
LDLC SERPL CALC-MCNC: 88 MG/DL (ref 0–100)
LYMPHOCYTES # BLD AUTO: 2.2 THOUSANDS/ΜL (ref 0.6–4.47)
LYMPHOCYTES NFR BLD AUTO: 30 % (ref 14–44)
MCH RBC QN AUTO: 31.3 PG (ref 26.8–34.3)
MCHC RBC AUTO-ENTMCNC: 32.8 G/DL (ref 31.4–37.4)
MCV RBC AUTO: 95 FL (ref 82–98)
MICROALBUMIN UR-MCNC: 10.5 MG/L
MICROALBUMIN/CREAT 24H UR: 11 MG/G CREATININE (ref 0–30)
MONOCYTES # BLD AUTO: 0.62 THOUSAND/ΜL (ref 0.17–1.22)
MONOCYTES NFR BLD AUTO: 9 % (ref 4–12)
NEUTROPHILS # BLD AUTO: 4.17 THOUSANDS/ΜL (ref 1.85–7.62)
NEUTS SEG NFR BLD AUTO: 57 % (ref 43–75)
NRBC BLD AUTO-RTO: 0 /100 WBCS
PLATELET # BLD AUTO: 333 THOUSANDS/UL (ref 149–390)
PMV BLD AUTO: 10.3 FL (ref 8.9–12.7)
POTASSIUM SERPL-SCNC: 4.4 MMOL/L (ref 3.5–5.3)
PROT SERPL-MCNC: 7.7 G/DL (ref 6.4–8.4)
RBC # BLD AUTO: 4.19 MILLION/UL (ref 3.81–5.12)
SODIUM SERPL-SCNC: 140 MMOL/L (ref 135–147)
TRIGL SERPL-MCNC: 114 MG/DL
TSH SERPL DL<=0.05 MIU/L-ACNC: 2.16 UIU/ML (ref 0.45–4.5)
WBC # BLD AUTO: 7.3 THOUSAND/UL (ref 4.31–10.16)

## 2024-10-16 PROCEDURE — 85025 COMPLETE CBC W/AUTO DIFF WBC: CPT

## 2024-10-16 PROCEDURE — 80053 COMPREHEN METABOLIC PANEL: CPT

## 2024-10-16 PROCEDURE — 82043 UR ALBUMIN QUANTITATIVE: CPT

## 2024-10-16 PROCEDURE — 83036 HEMOGLOBIN GLYCOSYLATED A1C: CPT

## 2024-10-16 PROCEDURE — 82570 ASSAY OF URINE CREATININE: CPT

## 2024-10-16 PROCEDURE — 36415 COLL VENOUS BLD VENIPUNCTURE: CPT

## 2024-10-16 PROCEDURE — 80061 LIPID PANEL: CPT

## 2024-10-16 PROCEDURE — 84443 ASSAY THYROID STIM HORMONE: CPT

## 2024-10-18 ENCOUNTER — RA CDI HCC (OUTPATIENT)
Dept: OTHER | Facility: HOSPITAL | Age: 79
End: 2024-10-18

## 2024-10-18 NOTE — PROGRESS NOTES
HCC coding opportunities       Chart reviewed, no opportunity found: CHART REVIEWED, NO OPPORTUNITY FOUND  Previous suggestions removed from BPA;    E11.51. E11.40 reported 7/1/21 however 10/16/23 PCP foot exam was WNL.  E11.29, R80.9 Last labs 10/16/24 WNL.      Patients Insurance     Medicare Insurance: [employer]

## 2024-10-19 DIAGNOSIS — E03.9 HYPOTHYROIDISM, UNSPECIFIED TYPE: ICD-10-CM

## 2024-10-20 RX ORDER — LEVOTHYROXINE SODIUM 75 UG/1
TABLET ORAL
Qty: 90 TABLET | Refills: 1 | Status: SHIPPED | OUTPATIENT
Start: 2024-10-20

## 2024-10-24 ENCOUNTER — OFFICE VISIT (OUTPATIENT)
Dept: FAMILY MEDICINE CLINIC | Facility: CLINIC | Age: 79
End: 2024-10-24
Payer: COMMERCIAL

## 2024-10-24 VITALS
DIASTOLIC BLOOD PRESSURE: 70 MMHG | HEIGHT: 64 IN | OXYGEN SATURATION: 96 % | SYSTOLIC BLOOD PRESSURE: 132 MMHG | WEIGHT: 229 LBS | BODY MASS INDEX: 39.09 KG/M2 | HEART RATE: 64 BPM

## 2024-10-24 DIAGNOSIS — E03.9 HYPOTHYROIDISM, UNSPECIFIED TYPE: ICD-10-CM

## 2024-10-24 DIAGNOSIS — F33.9 DEPRESSION, RECURRENT (HCC): ICD-10-CM

## 2024-10-24 DIAGNOSIS — R80.9 MICROALBUMINURIA: ICD-10-CM

## 2024-10-24 DIAGNOSIS — Z23 ENCOUNTER FOR IMMUNIZATION: ICD-10-CM

## 2024-10-24 DIAGNOSIS — E78.2 MIXED HYPERLIPIDEMIA: ICD-10-CM

## 2024-10-24 DIAGNOSIS — E11.65 TYPE 2 DIABETES MELLITUS WITH HYPERGLYCEMIA, WITHOUT LONG-TERM CURRENT USE OF INSULIN (HCC): Primary | ICD-10-CM

## 2024-10-24 DIAGNOSIS — Z00.00 MEDICARE ANNUAL WELLNESS VISIT, SUBSEQUENT: ICD-10-CM

## 2024-10-24 DIAGNOSIS — I10 ESSENTIAL HYPERTENSION: ICD-10-CM

## 2024-10-24 LAB
LEFT EYE DIABETIC RETINOPATHY: ABNORMAL
LEFT EYE IMAGE QUALITY: ABNORMAL
LEFT EYE MACULAR EDEMA: ABNORMAL
LEFT EYE OTHER RETINOPATHY: ABNORMAL
RIGHT EYE DIABETIC RETINOPATHY: ABNORMAL
RIGHT EYE IMAGE QUALITY: ABNORMAL
RIGHT EYE MACULAR EDEMA: ABNORMAL
RIGHT EYE OTHER RETINOPATHY: ABNORMAL
SEVERITY (EYE EXAM): ABNORMAL

## 2024-10-24 PROCEDURE — G0439 PPPS, SUBSEQ VISIT: HCPCS | Performed by: PHYSICIAN ASSISTANT

## 2024-10-24 PROCEDURE — G0008 ADMIN INFLUENZA VIRUS VAC: HCPCS

## 2024-10-24 PROCEDURE — 99214 OFFICE O/P EST MOD 30 MIN: CPT | Performed by: PHYSICIAN ASSISTANT

## 2024-10-24 PROCEDURE — 90662 IIV NO PRSV INCREASED AG IM: CPT

## 2024-10-24 NOTE — PATIENT INSTRUCTIONS
Assessment/plan:  1.  Type 2 diabetes-stable with hemoglobin A1c of 6.8, currently on Januvia therapy.  2.  Benign essential hypertension-presently stable with amlodipine and atenolol, no medication changes.  3.  Mixed hyperlipidemia-stable with simvastatin 40 mg daily.  4.  Microalbuminuria-stable with glucose control-level under 20.  5.  Hypothyroidism-stable with levothyroxine 75 mcg daily  6.  Recurrent depression-stable on citalopram 40 mg daily.  7.  Annual Medicare wellness visit-questionnaire completed.  Vaccines reviewed.  Screenings are up-to-date for age.

## 2024-10-24 NOTE — PROGRESS NOTES
Ambulatory Visit  Name: Lamonte Toscano      : 1945      MRN: [id number]  Encounter Provider: Joesph Hagen PA-C  Encounter Date: 10/24/2024   Encounter department: [facility] PRIMARY [facility]ARE  Patient Instructions       Assessment/plan:  1.  Type 2 diabetes-stable with hemoglobin A1c of 6.8, currently on Januvia therapy.  2.  Benign essential hypertension-presently stable with amlodipine and atenolol, no medication changes.  3.  Mixed hyperlipidemia-stable with simvastatin 40 mg daily.  4.  Microalbuminuria-stable with glucose control-level under 20.  5.  Hypothyroidism-stable with levothyroxine 75 mcg daily  6.  Recurrent depression-stable on citalopram 40 mg daily.  7.  Annual Medicare wellness visit-questionnaire completed.  Vaccines reviewed.  Screenings are up-to-date for age.    Assessment & Plan      Depression Screening and Follow-up Plan: Patient's depression screening was positive with a PHQ-9 score of 16. Patient assessed for underlying major depression. Brief counseling provided and recommend additional follow-up/re-evaluation next office visit.     Urinary Incontinence Plan of Care: counseling topics discussed: practice Kegel (pelvic floor strengthening) exercises.       Preventive health issues were discussed with patient, and age appropriate screening tests were ordered as noted in patient's After Visit Summary. Personalized health advice and appropriate referrals for health education or preventive services given if needed, as noted in patient's After Visit Summary.    History of Present Illness     HPI: This is an 79-year-old female who presents to the office for follow-up of chronic health conditions as well as annual Medicare wellness visit.       Patient Care Team:  Joesph Hagen PA-C as PCP - General (Family Medicine)  MD Joesph Peterson PA-C Daniel Scott Heckman, MD Katherine I Brown, PA-C    Review of Systems  Medical History Reviewed by provider this  encounter:       Annual Wellness Visit Questionnaire   Lamonte is here for her Subsequent Wellness visit.     Health Risk Assessment:   Patient rates overall health as good. Patient feels that their physical health rating is slightly worse. Patient is satisfied with their life. Eyesight was rated as slightly worse. Hearing was rated as much worse. Patient feels that their emotional and mental health rating is slightly worse. Patients states they are sometimes angry. Patient states they are always unusually tired/fatigued. Pain experienced in the last 7 days has been some. Patient's pain rating has been 7/10. Patient states that she has experienced no weight loss or gain in last 6 months. Bilateral knee pain    Depression Screening:   PHQ-9 Score: 16      Fall Risk Screening:   In the past year, patient has experienced: no history of falling in past year      Urinary Incontinence Screening:   Patient has leaked urine accidently in the last six months.     Home Safety:  Patient has trouble with stairs inside or outside of their home. Patient has working smoke alarms and has working carbon monoxide detector. Home safety hazards include: loose rugs on the floor.     Nutrition:   Current diet is Regular.     Medications:   Patient is able to manage medications.     Activities of Daily Living (ADLs)/Instrumental Activities of Daily Living (IADLs):   Walk and transfer into and out of bed and chair?: Yes  Dress and groom yourself?: Yes    Bathe or shower yourself?: Yes    Feed yourself? Yes  Do your laundry/housekeeping?: Yes  Manage your money, pay your bills and track your expenses?: Yes  Make your own meals?: Yes    Do your own shopping?: Yes    Previous Hospitalizations:   Any hospitalizations or ED visits within the last 12 months?: No      Advance Care Planning:   Living will: No    Durable POA for healthcare: No    Advanced directive: No      Cognitive Screening:   Provider or family/friend/caregiver concerned regarding  "cognition?: No    PREVENTIVE SCREENINGS      Cardiovascular Screening:    General: Screening Not Indicated and History Lipid Disorder      Diabetes Screening:     General: Screening Not Indicated and History Diabetes      Colorectal Cancer Screening:     General: Screening Not Indicated      Breast Cancer Screening:     General: Risks and Benefits Discussed      Cervical Cancer Screening:    General: Screening Not Indicated      Osteoporosis Screening:    General: Screening Current      Abdominal Aortic Aneurysm (AAA) Screening:        General: Screening Not Indicated      Lung Cancer Screening:     General: Screening Not Indicated      Hepatitis C Screening:    General: Screening Not Indicated    Screening, Brief Intervention, and Referral to Treatment (SBIRT)    Screening    Typical number of drinks in a week: 0    Single Item Drug Screening:  How often have you used an illegal drug (including marijuana) or a prescription medication for non-medical reasons in the past year? never    Single Item Drug Screen Score: 0  Interpretation: Negative screen for possible drug use disorder    SDOH Risk Assessment  Social determinants of health (SDOH) risk assesment tool was completed. The tool at a minimum covered housing stability, food insecurity, transportation needs, and utility difficulty. Patient had at risk responses for the following SDOH domains: housing stability.     Social Determinants of Health     Financial Resource Strain: Low Risk  (10/16/2023)    Overall Financial Resource Strain (CARDIA)     Difficulty of Paying Living Expenses: Not hard at all   Transportation Needs: No Transportation Needs (10/16/2023)    PRAPARE - Transportation     Lack of Transportation (Medical): No     Lack of Transportation (Non-Medical): No     No results found.    Objective     Ht 5' 4\" (1.626 m)   Wt 104 kg (229 lb)   BMI 39.31 kg/m²     Physical Exam  Constitutional:       General: She is not in acute distress.     Appearance: " Normal appearance.   HENT:      Head: Normocephalic and atraumatic.      Right Ear: Tympanic membrane normal.      Left Ear: Tympanic membrane normal.      Nose: No congestion or rhinorrhea.   Eyes:      Conjunctiva/sclera: Conjunctivae normal.      Pupils: Pupils are equal, round, and reactive to light.   Neck:      Vascular: No carotid bruit.   Cardiovascular:      Rate and Rhythm: Normal rate and regular rhythm.      Heart sounds: No murmur heard.  Pulmonary:      Effort: Pulmonary effort is normal. No respiratory distress.      Breath sounds: Normal breath sounds.   Abdominal:      Palpations: Abdomen is soft.   Musculoskeletal:         General: Normal range of motion.      Cervical back: Normal range of motion and neck supple. No muscular tenderness.   Lymphadenopathy:      Cervical: No cervical adenopathy.   Skin:     General: Skin is warm.      Capillary Refill: Capillary refill takes less than 2 seconds.   Neurological:      General: No focal deficit present.      Mental Status: She is alert and oriented to person, place, and time.   Psychiatric:         Mood and Affect: Mood normal.

## 2024-11-10 DIAGNOSIS — E78.2 MIXED HYPERLIPIDEMIA: ICD-10-CM

## 2024-11-11 RX ORDER — SIMVASTATIN 40 MG
TABLET ORAL
Qty: 90 TABLET | Refills: 1 | Status: SHIPPED | OUTPATIENT
Start: 2024-11-11

## 2024-11-30 DIAGNOSIS — I10 ESSENTIAL HYPERTENSION: ICD-10-CM

## 2024-12-02 RX ORDER — AMLODIPINE BESYLATE 5 MG/1
5 TABLET ORAL DAILY
Qty: 90 TABLET | Refills: 1 | Status: SHIPPED | OUTPATIENT
Start: 2024-12-02

## 2025-02-23 DIAGNOSIS — I10 ESSENTIAL HYPERTENSION: ICD-10-CM

## 2025-02-24 RX ORDER — ATENOLOL 25 MG/1
25 TABLET ORAL
Qty: 90 TABLET | Refills: 1 | Status: SHIPPED | OUTPATIENT
Start: 2025-02-24

## 2025-03-14 DIAGNOSIS — E03.9 HYPOTHYROIDISM, UNSPECIFIED TYPE: ICD-10-CM

## 2025-03-14 DIAGNOSIS — E11.9 TYPE 2 DIABETES MELLITUS WITHOUT COMPLICATION, WITHOUT LONG-TERM CURRENT USE OF INSULIN (HCC): ICD-10-CM

## 2025-03-14 DIAGNOSIS — E78.2 MIXED HYPERLIPIDEMIA: ICD-10-CM

## 2025-03-14 RX ORDER — SITAGLIPTIN 100 MG/1
100 TABLET, FILM COATED ORAL DAILY
Qty: 90 TABLET | Refills: 1 | Status: SHIPPED | OUTPATIENT
Start: 2025-03-14

## 2025-03-14 RX ORDER — SIMVASTATIN 40 MG
40 TABLET ORAL DAILY
Qty: 90 TABLET | Refills: 1 | Status: SHIPPED | OUTPATIENT
Start: 2025-03-14

## 2025-03-15 RX ORDER — LEVOTHYROXINE SODIUM 75 UG/1
75 TABLET ORAL DAILY
Qty: 90 TABLET | Refills: 1 | Status: SHIPPED | OUTPATIENT
Start: 2025-03-15

## 2025-04-22 ENCOUNTER — TELEPHONE (OUTPATIENT)
Age: 80
End: 2025-04-22

## 2025-04-22 NOTE — TELEPHONE ENCOUNTER
Nataliya from office of aging  requesting an update on paper work faxed to office last week.  Office fax number given to refax forms.

## 2025-04-28 ENCOUNTER — APPOINTMENT (OUTPATIENT)
Dept: LAB | Facility: CLINIC | Age: 80
End: 2025-04-28
Attending: PHYSICIAN ASSISTANT
Payer: COMMERCIAL

## 2025-04-28 ENCOUNTER — RESULTS FOLLOW-UP (OUTPATIENT)
Dept: FAMILY MEDICINE CLINIC | Facility: CLINIC | Age: 80
End: 2025-04-28

## 2025-04-28 DIAGNOSIS — E78.2 MIXED HYPERLIPIDEMIA: ICD-10-CM

## 2025-04-28 DIAGNOSIS — E11.65 TYPE 2 DIABETES MELLITUS WITH HYPERGLYCEMIA, WITHOUT LONG-TERM CURRENT USE OF INSULIN (HCC): ICD-10-CM

## 2025-04-28 DIAGNOSIS — R80.9 MICROALBUMINURIA: ICD-10-CM

## 2025-04-28 DIAGNOSIS — E03.9 HYPOTHYROIDISM, UNSPECIFIED TYPE: ICD-10-CM

## 2025-04-28 DIAGNOSIS — I10 ESSENTIAL HYPERTENSION: ICD-10-CM

## 2025-04-28 LAB
ALBUMIN SERPL BCG-MCNC: 4.2 G/DL (ref 3.5–5)
ALP SERPL-CCNC: 59 U/L (ref 34–104)
ALT SERPL W P-5'-P-CCNC: 12 U/L (ref 7–52)
ANION GAP SERPL CALCULATED.3IONS-SCNC: 7 MMOL/L (ref 4–13)
AST SERPL W P-5'-P-CCNC: 17 U/L (ref 13–39)
BASOPHILS # BLD AUTO: 0.05 THOUSANDS/ÂΜL (ref 0–0.1)
BASOPHILS NFR BLD AUTO: 1 % (ref 0–1)
BILIRUB SERPL-MCNC: 0.47 MG/DL (ref 0.2–1)
BUN SERPL-MCNC: 18 MG/DL (ref 5–25)
CALCIUM SERPL-MCNC: 9.6 MG/DL (ref 8.4–10.2)
CHLORIDE SERPL-SCNC: 100 MMOL/L (ref 96–108)
CHOLEST SERPL-MCNC: 160 MG/DL (ref ?–200)
CO2 SERPL-SCNC: 32 MMOL/L (ref 21–32)
CREAT SERPL-MCNC: 0.6 MG/DL (ref 0.6–1.3)
CREAT UR-MCNC: 105.9 MG/DL
EOSINOPHIL # BLD AUTO: 0.18 THOUSAND/ÂΜL (ref 0–0.61)
EOSINOPHIL NFR BLD AUTO: 3 % (ref 0–6)
ERYTHROCYTE [DISTWIDTH] IN BLOOD BY AUTOMATED COUNT: 13.8 % (ref 11.6–15.1)
EST. AVERAGE GLUCOSE BLD GHB EST-MCNC: 140 MG/DL
GFR SERPL CREATININE-BSD FRML MDRD: 85 ML/MIN/1.73SQ M
GLUCOSE P FAST SERPL-MCNC: 107 MG/DL (ref 65–99)
HBA1C MFR BLD: 6.5 %
HCT VFR BLD AUTO: 40.1 % (ref 34.8–46.1)
HDLC SERPL-MCNC: 61 MG/DL
HGB BLD-MCNC: 12.6 G/DL (ref 11.5–15.4)
IMM GRANULOCYTES # BLD AUTO: 0.01 THOUSAND/UL (ref 0–0.2)
IMM GRANULOCYTES NFR BLD AUTO: 0 % (ref 0–2)
LDLC SERPL CALC-MCNC: 73 MG/DL (ref 0–100)
LYMPHOCYTES # BLD AUTO: 1.57 THOUSANDS/ÂΜL (ref 0.6–4.47)
LYMPHOCYTES NFR BLD AUTO: 22 % (ref 14–44)
MCH RBC QN AUTO: 31 PG (ref 26.8–34.3)
MCHC RBC AUTO-ENTMCNC: 31.4 G/DL (ref 31.4–37.4)
MCV RBC AUTO: 99 FL (ref 82–98)
MICROALBUMIN UR-MCNC: 30.7 MG/L
MICROALBUMIN/CREAT 24H UR: 29 MG/G CREATININE (ref 0–30)
MONOCYTES # BLD AUTO: 0.51 THOUSAND/ÂΜL (ref 0.17–1.22)
MONOCYTES NFR BLD AUTO: 7 % (ref 4–12)
NEUTROPHILS # BLD AUTO: 4.79 THOUSANDS/ÂΜL (ref 1.85–7.62)
NEUTS SEG NFR BLD AUTO: 67 % (ref 43–75)
NRBC BLD AUTO-RTO: 0 /100 WBCS
PLATELET # BLD AUTO: 325 THOUSANDS/UL (ref 149–390)
PMV BLD AUTO: 10.7 FL (ref 8.9–12.7)
POTASSIUM SERPL-SCNC: 4.6 MMOL/L (ref 3.5–5.3)
PROT SERPL-MCNC: 7.3 G/DL (ref 6.4–8.4)
RBC # BLD AUTO: 4.07 MILLION/UL (ref 3.81–5.12)
SODIUM SERPL-SCNC: 139 MMOL/L (ref 135–147)
TRIGL SERPL-MCNC: 130 MG/DL (ref ?–150)
TSH SERPL DL<=0.05 MIU/L-ACNC: 2.29 UIU/ML (ref 0.45–4.5)
WBC # BLD AUTO: 7.11 THOUSAND/UL (ref 4.31–10.16)

## 2025-04-28 PROCEDURE — 84443 ASSAY THYROID STIM HORMONE: CPT

## 2025-04-28 PROCEDURE — 36415 COLL VENOUS BLD VENIPUNCTURE: CPT

## 2025-04-28 PROCEDURE — 80053 COMPREHEN METABOLIC PANEL: CPT

## 2025-04-28 PROCEDURE — 85025 COMPLETE CBC W/AUTO DIFF WBC: CPT

## 2025-04-28 PROCEDURE — 82043 UR ALBUMIN QUANTITATIVE: CPT

## 2025-04-28 PROCEDURE — 82570 ASSAY OF URINE CREATININE: CPT

## 2025-04-28 PROCEDURE — 80061 LIPID PANEL: CPT

## 2025-04-28 PROCEDURE — 83036 HEMOGLOBIN GLYCOSYLATED A1C: CPT

## 2025-05-01 ENCOUNTER — OFFICE VISIT (OUTPATIENT)
Dept: FAMILY MEDICINE CLINIC | Facility: CLINIC | Age: 80
End: 2025-05-01
Payer: COMMERCIAL

## 2025-05-01 VITALS
SYSTOLIC BLOOD PRESSURE: 138 MMHG | WEIGHT: 232 LBS | DIASTOLIC BLOOD PRESSURE: 70 MMHG | BODY MASS INDEX: 39.61 KG/M2 | RESPIRATION RATE: 16 BRPM | TEMPERATURE: 97.4 F | HEIGHT: 64 IN | OXYGEN SATURATION: 97 % | HEART RATE: 63 BPM

## 2025-05-01 DIAGNOSIS — Z23 ENCOUNTER FOR IMMUNIZATION: ICD-10-CM

## 2025-05-01 DIAGNOSIS — E03.9 HYPOTHYROIDISM, UNSPECIFIED TYPE: ICD-10-CM

## 2025-05-01 DIAGNOSIS — I10 ESSENTIAL HYPERTENSION: ICD-10-CM

## 2025-05-01 DIAGNOSIS — E11.65 TYPE 2 DIABETES MELLITUS WITH HYPERGLYCEMIA, WITHOUT LONG-TERM CURRENT USE OF INSULIN (HCC): Primary | ICD-10-CM

## 2025-05-01 DIAGNOSIS — E66.01 SEVERE OBESITY (BMI 35.0-35.9 WITH COMORBIDITY) (HCC): ICD-10-CM

## 2025-05-01 DIAGNOSIS — E78.2 MIXED HYPERLIPIDEMIA: ICD-10-CM

## 2025-05-01 PROCEDURE — G0009 ADMIN PNEUMOCOCCAL VACCINE: HCPCS

## 2025-05-01 PROCEDURE — 90677 PCV20 VACCINE IM: CPT

## 2025-05-01 PROCEDURE — G2211 COMPLEX E/M VISIT ADD ON: HCPCS | Performed by: PHYSICIAN ASSISTANT

## 2025-05-01 PROCEDURE — 99214 OFFICE O/P EST MOD 30 MIN: CPT | Performed by: PHYSICIAN ASSISTANT

## 2025-05-01 NOTE — PROGRESS NOTES
Diabetic Foot Exam    Patient's shoes and socks removed.    Right Foot/Ankle   Right Foot Inspection  Skin Exam: skin normal and skin intact. No dry skin, no warmth, no callus, no erythema, no maceration, no abnormal color, no pre-ulcer, no ulcer and no callus.     Toe Exam: ROM and strength within normal limits and swelling.     Sensory   Vibration: intact  Proprioception: intact  Monofilament testing: intact    Vascular  Capillary refills: < 3 seconds  The right DP pulse is 1+. The right PT pulse is 1+.     Right Toe  - Comprehensive Exam  Ecchymosis: none  Arch: normal  Hammertoes: absent  Claw Toes: absent  Swelling: none   Tenderness: none       Left Foot/Ankle  Left Foot Inspection  Skin Exam: skin normal and skin intact. No dry skin, no warmth, no erythema, no maceration, normal color, no pre-ulcer, no ulcer and no callus.     Toe Exam: ROM and strength within normal limits and swelling.     Sensory   Vibration: intact  Proprioception: intact  Monofilament testing: intact    Vascular  Capillary refills: < 3 seconds  The left DP pulse is 1+. The left PT pulse is 1+.     Left Toe  - Comprehensive Exam  Ecchymosis: none  Arch: normal  Hammertoes: absent  Claw toes: absent  Swelling: none   Tenderness: none       Assign Risk Category  No deformity present  No loss of protective sensation  Weak pulses  Risk: 2  Name: Lamonte Toscano      : 1945      MRN: [id number]  Encounter Provider: Joesph Hagen PA-C  Encounter Date: 2025   Encounter department: [facility] PRIMARY CARE  :  Assessment & Plan  Type 2 diabetes mellitus with hyperglycemia, without long-term current use of insulin ([facility])    Lab Results   Component Value Date    HGBA1C 6.5 (H) 2025   Stable with hemoglobin A1c of 6.5, currently on Januvia therapy.  No medication changes.  Orders:  •  Albumin / creatinine urine ratio; Future  •  Comprehensive metabolic panel; Future  •  Hemoglobin A1C; Future  •  Lipid Panel with Direct LDL  reflex; Future  •  TSH, 3rd generation with Free T4 reflex; Future  •  CBC and differential; Future    Severe obesity (BMI 35.0-35.9 with comorbidity) (HCC)  Diet and exercise were reviewed.  Orders:  •  Albumin / creatinine urine ratio; Future  •  Comprehensive metabolic panel; Future  •  Hemoglobin A1C; Future  •  Lipid Panel with Direct LDL reflex; Future  •  TSH, 3rd generation with Free T4 reflex; Future  •  CBC and differential; Future    Hypothyroidism, unspecified type  Stable with levothyroxine 75 mcg daily, no medication changes.  Orders:  •  Albumin / creatinine urine ratio; Future  •  Comprehensive metabolic panel; Future  •  Hemoglobin A1C; Future  •  Lipid Panel with Direct LDL reflex; Future  •  TSH, 3rd generation with Free T4 reflex; Future  •  CBC and differential; Future    Essential hypertension  Stable on amlodipine and atenolol, no medication changes.  Orders:  •  Albumin / creatinine urine ratio; Future  •  Comprehensive metabolic panel; Future  •  Hemoglobin A1C; Future  •  Lipid Panel with Direct LDL reflex; Future  •  TSH, 3rd generation with Free T4 reflex; Future  •  CBC and differential; Future    Mixed hyperlipidemia  Stable on simvastatin 40 mg daily, no medication changes.  Orders:  •  Albumin / creatinine urine ratio; Future  •  Comprehensive metabolic panel; Future  •  Hemoglobin A1C; Future  •  Lipid Panel with Direct LDL reflex; Future  •  TSH, 3rd generation with Free T4 reflex; Future  •  CBC and differential; Future    Encounter for immunization    Orders:  •  Pneumococcal Conjugate Vaccine 20-valent (Pcv20)           History of Present Illness   HPI: This is an 80-year-old female who presents to the office for follow-up of chronic health conditions and recent blood work.  She does have a history of type 2 diabetes which has been controlled with Januvia.  She has previously had diarrhea on metformin therapy.  She has been tolerating medication well without any side effects.   "Recent labs show that her hemoglobin A1c is 6.5.  She is not having any problems with hypoglycemia and she denies chest pains or shortness of breath.  She does have some baseline edema of the lower extremity but has not had any changes and does not seem bothered by the amount that she has.  She has not had any diarrhea or constipation.  Her cholesterol has been stable on simvastatin 40 mg daily and she continues amlodipine and atenolol for her blood pressure.  She is agreeable to receiving Prevnar 20 vaccination today.      Review of Systems   Constitutional:  Negative for chills, fatigue and fever.   HENT:  Negative for congestion, ear pain and sinus pressure.    Eyes:  Negative for visual disturbance.   Respiratory:  Negative for cough, chest tightness and shortness of breath.    Cardiovascular:  Negative for chest pain and palpitations.   Gastrointestinal:  Negative for diarrhea, nausea and vomiting.   Endocrine: Negative for polyuria.   Genitourinary:  Negative for dysuria and frequency.   Musculoskeletal:  Negative for arthralgias and myalgias.   Skin:  Negative for pallor and rash.   Neurological:  Negative for dizziness, weakness, light-headedness, numbness and headaches.   Psychiatric/Behavioral:  Negative for agitation, behavioral problems and sleep disturbance.    All other systems reviewed and are negative.      Objective   /70 (BP Location: Left arm, Patient Position: Sitting, Cuff Size: Adult)   Pulse 63   Temp (!) 97.4 °F (36.3 °C) (Tympanic)   Resp 16   Ht 5' 4\" (1.626 m)   Wt 105 kg (232 lb)   SpO2 97%   BMI 39.82 kg/m²      Physical Exam  Constitutional:       General: She is not in acute distress.     Appearance: Normal appearance.   HENT:      Head: Normocephalic and atraumatic.      Right Ear: Tympanic membrane normal.      Left Ear: Tympanic membrane normal.      Nose: No congestion or rhinorrhea.   Eyes:      Conjunctiva/sclera: Conjunctivae normal.      Pupils: Pupils are equal, " round, and reactive to light.   Neck:      Vascular: No carotid bruit.   Cardiovascular:      Rate and Rhythm: Normal rate and regular rhythm.      Pulses: Pulses are weak.           Dorsalis pedis pulses are 1+ on the right side and 1+ on the left side.        Posterior tibial pulses are 1+ on the right side and 1+ on the left side.      Heart sounds: No murmur heard.  Pulmonary:      Effort: Pulmonary effort is normal. No respiratory distress.      Breath sounds: Normal breath sounds.   Abdominal:      Palpations: Abdomen is soft.   Musculoskeletal:         General: Normal range of motion.      Cervical back: Normal range of motion and neck supple. No muscular tenderness.   Feet:      Right foot:      Skin integrity: No ulcer, skin breakdown, erythema, warmth, callus or dry skin.      Left foot:      Skin integrity: No ulcer, skin breakdown, erythema, warmth, callus or dry skin.   Lymphadenopathy:      Cervical: No cervical adenopathy.   Skin:     General: Skin is warm.      Capillary Refill: Capillary refill takes less than 2 seconds.   Neurological:      General: No focal deficit present.      Mental Status: She is alert and oriented to person, place, and time.   Psychiatric:         Mood and Affect: Mood normal.

## 2025-05-01 NOTE — ASSESSMENT & PLAN NOTE
Diet and exercise were reviewed.  Orders:  •  Albumin / creatinine urine ratio; Future  •  Comprehensive metabolic panel; Future  •  Hemoglobin A1C; Future  •  Lipid Panel with Direct LDL reflex; Future  •  TSH, 3rd generation with Free T4 reflex; Future  •  CBC and differential; Future

## 2025-05-01 NOTE — ASSESSMENT & PLAN NOTE
Stable with levothyroxine 75 mcg daily, no medication changes.  Orders:  •  Albumin / creatinine urine ratio; Future  •  Comprehensive metabolic panel; Future  •  Hemoglobin A1C; Future  •  Lipid Panel with Direct LDL reflex; Future  •  TSH, 3rd generation with Free T4 reflex; Future  •  CBC and differential; Future

## 2025-05-01 NOTE — ASSESSMENT & PLAN NOTE
Stable on amlodipine and atenolol, no medication changes.  Orders:  •  Albumin / creatinine urine ratio; Future  •  Comprehensive metabolic panel; Future  •  Hemoglobin A1C; Future  •  Lipid Panel with Direct LDL reflex; Future  •  TSH, 3rd generation with Free T4 reflex; Future  •  CBC and differential; Future

## 2025-05-01 NOTE — ASSESSMENT & PLAN NOTE
Stable on simvastatin 40 mg daily, no medication changes.  Orders:  •  Albumin / creatinine urine ratio; Future  •  Comprehensive metabolic panel; Future  •  Hemoglobin A1C; Future  •  Lipid Panel with Direct LDL reflex; Future  •  TSH, 3rd generation with Free T4 reflex; Future  •  CBC and differential; Future

## 2025-05-01 NOTE — ASSESSMENT & PLAN NOTE
Lab Results   Component Value Date    HGBA1C 6.5 (H) 04/28/2025   Stable with hemoglobin A1c of 6.5, currently on Januvia therapy.  No medication changes.  Orders:  •  Albumin / creatinine urine ratio; Future  •  Comprehensive metabolic panel; Future  •  Hemoglobin A1C; Future  •  Lipid Panel with Direct LDL reflex; Future  •  TSH, 3rd generation with Free T4 reflex; Future  •  CBC and differential; Future

## 2025-06-08 DIAGNOSIS — F32.9 REACTIVE DEPRESSION: ICD-10-CM

## 2025-06-08 RX ORDER — CITALOPRAM HYDROBROMIDE 40 MG/1
40 TABLET ORAL DAILY
Qty: 90 TABLET | Refills: 1 | Status: SHIPPED | OUTPATIENT
Start: 2025-06-08

## 2025-06-14 DIAGNOSIS — I10 ESSENTIAL HYPERTENSION: ICD-10-CM

## 2025-06-15 RX ORDER — AMLODIPINE BESYLATE 5 MG/1
5 TABLET ORAL DAILY
Qty: 90 TABLET | Refills: 1 | Status: SHIPPED | OUTPATIENT
Start: 2025-06-15

## (undated) DEVICE — STERILE POLYISOPRENE POWDER-FREE SURGICAL GLOVES WITH EMOLLIENT COATING: Brand: PROTEXIS

## (undated) DEVICE — BETHLEHEM UNIVERSAL  MIONR EXT: Brand: CARDINAL HEALTH

## (undated) DEVICE — GAUZE SPONGES,16 PLY: Brand: CURITY

## (undated) DEVICE — X-RAY DETECTABLE SPONGES,16 PLY: Brand: VISTEC

## (undated) DEVICE — DRAPE SHEET THREE QUARTER

## (undated) DEVICE — ACE WRAP 3 IN STERILE

## (undated) DEVICE — NEEDLE 18 G X 1 1/2 SAFETY

## (undated) DEVICE — STERILE POLYISOPRENE POWDER-FREE SURGICAL GLOVES: Brand: PROTEXIS

## (undated) DEVICE — CHLORAPREP HI-LITE 26ML ORANGE

## (undated) DEVICE — OCCLUSIVE GAUZE STRIP,3% BISMUTH TRIBROMOPHENATE IN PETROLATUM BLEND: Brand: XEROFORM

## (undated) DEVICE — CUFF TOURNIQUET DISP SZ18

## (undated) DEVICE — SCD SEQUENTIAL COMPRESSION COMFORT SLEEVE MEDIUM KNEE LENGTH: Brand: KENDALL SCD

## (undated) DEVICE — PAD CAST 4 IN COTTON NON STERILE

## (undated) DEVICE — K-WIRE COVERS, STERILE, WHITE, .043-INCH, 1.1MM, 2 PER PACKAGE: Brand: KEY SURGICAL K-WIRE AND PIN COVERS

## (undated) DEVICE — SYRINGE 30ML LL

## (undated) DEVICE — SYRINGE 10ML LL CONTROL TOP

## (undated) DEVICE — PADDING CAST 3IN COTTON STRL

## (undated) DEVICE — NEEDLE 25G X 1 1/2

## (undated) DEVICE — SPLINT PLASTER 4 IN X 15 IN

## (undated) DEVICE — PAD CAST 3 IN COTTON NON STRL

## (undated) DEVICE — DRAPE C-ARM X-RAY

## (undated) DEVICE — STERILE SYNTHETIC NEOPRENE POWDER-FREE SURGICAL GLOVES WITH NITRILE COATING, SMOOTH FINISH, STRAIGHT FINGER: Brand: PROTEXIS